# Patient Record
Sex: MALE | Race: WHITE | Employment: OTHER | URBAN - NONMETROPOLITAN AREA
[De-identification: names, ages, dates, MRNs, and addresses within clinical notes are randomized per-mention and may not be internally consistent; named-entity substitution may affect disease eponyms.]

---

## 2017-01-09 ENCOUNTER — OFFICE VISIT (OUTPATIENT)
Dept: FAMILY MEDICINE CLINIC | Age: 67
End: 2017-01-09

## 2017-01-09 VITALS
SYSTOLIC BLOOD PRESSURE: 136 MMHG | DIASTOLIC BLOOD PRESSURE: 88 MMHG | RESPIRATION RATE: 12 BRPM | BODY MASS INDEX: 32.41 KG/M2 | WEIGHT: 226.4 LBS | HEART RATE: 68 BPM | TEMPERATURE: 98.8 F | HEIGHT: 70 IN

## 2017-01-09 DIAGNOSIS — E78.00 PURE HYPERCHOLESTEROLEMIA: Chronic | ICD-10-CM

## 2017-01-09 DIAGNOSIS — I10 ESSENTIAL HYPERTENSION: Chronic | ICD-10-CM

## 2017-01-09 DIAGNOSIS — R73.03 PREDIABETES: Primary | Chronic | ICD-10-CM

## 2017-01-09 DIAGNOSIS — Z11.59 NEED FOR HEPATITIS C SCREENING TEST: ICD-10-CM

## 2017-01-09 PROCEDURE — 99214 OFFICE O/P EST MOD 30 MIN: CPT | Performed by: FAMILY MEDICINE

## 2017-01-10 ENCOUNTER — TELEPHONE (OUTPATIENT)
Dept: FAMILY MEDICINE CLINIC | Age: 67
End: 2017-01-10

## 2017-03-03 ENCOUNTER — TELEPHONE (OUTPATIENT)
Dept: FAMILY MEDICINE CLINIC | Age: 67
End: 2017-03-03

## 2017-04-03 ENCOUNTER — TELEPHONE (OUTPATIENT)
Dept: FAMILY MEDICINE CLINIC | Age: 67
End: 2017-04-03

## 2017-04-03 DIAGNOSIS — R73.9 HYPERGLYCEMIA: ICD-10-CM

## 2017-04-03 DIAGNOSIS — R73.03 PREDIABETES: Primary | Chronic | ICD-10-CM

## 2017-04-17 RX ORDER — AMITRIPTYLINE HYDROCHLORIDE 25 MG/1
25 TABLET, FILM COATED ORAL NIGHTLY
Qty: 30 TABLET | Refills: 11 | Status: SHIPPED | OUTPATIENT
Start: 2017-04-17 | End: 2018-04-30 | Stop reason: SDUPTHER

## 2017-04-17 RX ORDER — LISINOPRIL AND HYDROCHLOROTHIAZIDE 12.5; 1 MG/1; MG/1
1 TABLET ORAL DAILY
Qty: 90 TABLET | Refills: 3 | Status: SHIPPED | OUTPATIENT
Start: 2017-04-17 | End: 2018-05-15 | Stop reason: SDUPTHER

## 2017-05-08 ENCOUNTER — TELEPHONE (OUTPATIENT)
Dept: FAMILY MEDICINE CLINIC | Age: 67
End: 2017-05-08

## 2017-05-08 DIAGNOSIS — E78.00 PURE HYPERCHOLESTEROLEMIA: Primary | Chronic | ICD-10-CM

## 2017-05-09 RX ORDER — ATORVASTATIN CALCIUM 20 MG/1
20 TABLET, FILM COATED ORAL DAILY
Qty: 30 TABLET | Refills: 11 | Status: SHIPPED | OUTPATIENT
Start: 2017-05-09 | End: 2017-05-09

## 2017-05-09 RX ORDER — ROSUVASTATIN CALCIUM 10 MG/1
10 TABLET, COATED ORAL DAILY
Qty: 30 TABLET | Refills: 11 | Status: SHIPPED | OUTPATIENT
Start: 2017-05-09 | End: 2018-05-15

## 2017-06-28 ENCOUNTER — TELEPHONE (OUTPATIENT)
Dept: FAMILY MEDICINE CLINIC | Age: 67
End: 2017-06-28

## 2017-11-06 ENCOUNTER — TELEPHONE (OUTPATIENT)
Dept: FAMILY MEDICINE CLINIC | Age: 67
End: 2017-11-06

## 2018-04-30 RX ORDER — AMITRIPTYLINE HYDROCHLORIDE 25 MG/1
25 TABLET, FILM COATED ORAL NIGHTLY
Qty: 30 TABLET | Refills: 11 | Status: SHIPPED | OUTPATIENT
Start: 2018-04-30 | End: 2018-06-05 | Stop reason: SDUPTHER

## 2018-05-07 ENCOUNTER — TELEPHONE (OUTPATIENT)
Dept: FAMILY MEDICINE CLINIC | Age: 68
End: 2018-05-07

## 2018-05-15 ENCOUNTER — OFFICE VISIT (OUTPATIENT)
Dept: FAMILY MEDICINE CLINIC | Age: 68
End: 2018-05-15
Payer: MEDICARE

## 2018-05-15 VITALS
BODY MASS INDEX: 33.64 KG/M2 | HEIGHT: 70 IN | TEMPERATURE: 98.3 F | WEIGHT: 235 LBS | RESPIRATION RATE: 14 BRPM | HEART RATE: 92 BPM | DIASTOLIC BLOOD PRESSURE: 84 MMHG | SYSTOLIC BLOOD PRESSURE: 122 MMHG

## 2018-05-15 DIAGNOSIS — I10 ESSENTIAL HYPERTENSION: Chronic | ICD-10-CM

## 2018-05-15 DIAGNOSIS — F51.01 PRIMARY INSOMNIA: Chronic | ICD-10-CM

## 2018-05-15 DIAGNOSIS — R73.03 PREDIABETES: Primary | Chronic | ICD-10-CM

## 2018-05-15 DIAGNOSIS — E78.00 PURE HYPERCHOLESTEROLEMIA: Chronic | ICD-10-CM

## 2018-05-15 PROCEDURE — 1123F ACP DISCUSS/DSCN MKR DOCD: CPT | Performed by: FAMILY MEDICINE

## 2018-05-15 PROCEDURE — G8417 CALC BMI ABV UP PARAM F/U: HCPCS | Performed by: FAMILY MEDICINE

## 2018-05-15 PROCEDURE — 3017F COLORECTAL CA SCREEN DOC REV: CPT | Performed by: FAMILY MEDICINE

## 2018-05-15 PROCEDURE — 99214 OFFICE O/P EST MOD 30 MIN: CPT | Performed by: FAMILY MEDICINE

## 2018-05-15 PROCEDURE — 1036F TOBACCO NON-USER: CPT | Performed by: FAMILY MEDICINE

## 2018-05-15 PROCEDURE — 4040F PNEUMOC VAC/ADMIN/RCVD: CPT | Performed by: FAMILY MEDICINE

## 2018-05-15 PROCEDURE — G8427 DOCREV CUR MEDS BY ELIG CLIN: HCPCS | Performed by: FAMILY MEDICINE

## 2018-05-15 RX ORDER — LISINOPRIL AND HYDROCHLOROTHIAZIDE 12.5; 1 MG/1; MG/1
1 TABLET ORAL DAILY
Qty: 90 TABLET | Refills: 3 | Status: SHIPPED | OUTPATIENT
Start: 2018-05-15 | End: 2019-08-05 | Stop reason: SDUPTHER

## 2018-05-15 ASSESSMENT — PATIENT HEALTH QUESTIONNAIRE - PHQ9
SUM OF ALL RESPONSES TO PHQ QUESTIONS 1-9: 0
1. LITTLE INTEREST OR PLEASURE IN DOING THINGS: 0
2. FEELING DOWN, DEPRESSED OR HOPELESS: 0
SUM OF ALL RESPONSES TO PHQ9 QUESTIONS 1 & 2: 0

## 2018-06-05 ENCOUNTER — TELEPHONE (OUTPATIENT)
Dept: FAMILY MEDICINE CLINIC | Age: 68
End: 2018-06-05

## 2018-06-05 DIAGNOSIS — F51.01 PRIMARY INSOMNIA: Primary | Chronic | ICD-10-CM

## 2018-06-05 RX ORDER — AMITRIPTYLINE HYDROCHLORIDE 25 MG/1
25-50 TABLET, FILM COATED ORAL NIGHTLY
Qty: 60 TABLET | Refills: 11 | Status: SHIPPED | OUTPATIENT
Start: 2018-06-05 | End: 2018-07-18 | Stop reason: SDUPTHER

## 2018-07-18 DIAGNOSIS — F51.01 PRIMARY INSOMNIA: Chronic | ICD-10-CM

## 2018-07-18 RX ORDER — AMITRIPTYLINE HYDROCHLORIDE 25 MG/1
25-50 TABLET, FILM COATED ORAL NIGHTLY
Qty: 60 TABLET | Refills: 11 | Status: SHIPPED | OUTPATIENT
Start: 2018-07-18 | End: 2019-02-22 | Stop reason: CLARIF

## 2018-11-05 ENCOUNTER — TELEPHONE (OUTPATIENT)
Dept: FAMILY MEDICINE CLINIC | Age: 68
End: 2018-11-05

## 2018-11-05 DIAGNOSIS — E78.00 PURE HYPERCHOLESTEROLEMIA: Primary | Chronic | ICD-10-CM

## 2018-11-05 NOTE — TELEPHONE ENCOUNTER
Pt returned our call & states he really doesn't believe he's eating any different. Pt states \"in fact I think I've been eating a little bit better than I was. \"  Please advise.

## 2019-01-09 ENCOUNTER — OFFICE VISIT (OUTPATIENT)
Dept: FAMILY MEDICINE CLINIC | Age: 69
End: 2019-01-09
Payer: MEDICARE

## 2019-01-09 VITALS
RESPIRATION RATE: 16 BRPM | TEMPERATURE: 97.8 F | SYSTOLIC BLOOD PRESSURE: 126 MMHG | HEIGHT: 70 IN | BODY MASS INDEX: 33.58 KG/M2 | WEIGHT: 234.6 LBS | DIASTOLIC BLOOD PRESSURE: 82 MMHG | HEART RATE: 96 BPM

## 2019-01-09 DIAGNOSIS — F41.9 ANXIETY: ICD-10-CM

## 2019-01-09 DIAGNOSIS — H93.13 TINNITUS OF BOTH EARS: Primary | Chronic | ICD-10-CM

## 2019-01-09 PROCEDURE — G8427 DOCREV CUR MEDS BY ELIG CLIN: HCPCS | Performed by: FAMILY MEDICINE

## 2019-01-09 PROCEDURE — 1123F ACP DISCUSS/DSCN MKR DOCD: CPT | Performed by: FAMILY MEDICINE

## 2019-01-09 PROCEDURE — G8417 CALC BMI ABV UP PARAM F/U: HCPCS | Performed by: FAMILY MEDICINE

## 2019-01-09 PROCEDURE — 4040F PNEUMOC VAC/ADMIN/RCVD: CPT | Performed by: FAMILY MEDICINE

## 2019-01-09 PROCEDURE — 3017F COLORECTAL CA SCREEN DOC REV: CPT | Performed by: FAMILY MEDICINE

## 2019-01-09 PROCEDURE — 1101F PT FALLS ASSESS-DOCD LE1/YR: CPT | Performed by: FAMILY MEDICINE

## 2019-01-09 PROCEDURE — 99213 OFFICE O/P EST LOW 20 MIN: CPT | Performed by: FAMILY MEDICINE

## 2019-01-09 PROCEDURE — 1036F TOBACCO NON-USER: CPT | Performed by: FAMILY MEDICINE

## 2019-01-09 PROCEDURE — G8482 FLU IMMUNIZE ORDER/ADMIN: HCPCS | Performed by: FAMILY MEDICINE

## 2019-01-09 RX ORDER — CLONAZEPAM 0.5 MG/1
0.5 TABLET ORAL 2 TIMES DAILY PRN
Qty: 60 TABLET | Refills: 0 | Status: SHIPPED | OUTPATIENT
Start: 2019-01-09 | End: 2019-02-22 | Stop reason: SDUPTHER

## 2019-01-10 ENCOUNTER — TELEPHONE (OUTPATIENT)
Dept: FAMILY MEDICINE CLINIC | Age: 69
End: 2019-01-10

## 2019-02-22 ENCOUNTER — OFFICE VISIT (OUTPATIENT)
Dept: FAMILY MEDICINE CLINIC | Age: 69
End: 2019-02-22
Payer: MEDICARE

## 2019-02-22 VITALS
TEMPERATURE: 98 F | DIASTOLIC BLOOD PRESSURE: 78 MMHG | RESPIRATION RATE: 20 BRPM | WEIGHT: 239 LBS | BODY MASS INDEX: 34.22 KG/M2 | HEIGHT: 70 IN | SYSTOLIC BLOOD PRESSURE: 110 MMHG | HEART RATE: 86 BPM

## 2019-02-22 DIAGNOSIS — F41.9 ANXIETY: ICD-10-CM

## 2019-02-22 DIAGNOSIS — I10 ESSENTIAL HYPERTENSION: Chronic | ICD-10-CM

## 2019-02-22 DIAGNOSIS — H93.13 TINNITUS OF BOTH EARS: Primary | Chronic | ICD-10-CM

## 2019-02-22 DIAGNOSIS — E78.00 PURE HYPERCHOLESTEROLEMIA: Chronic | ICD-10-CM

## 2019-02-22 PROCEDURE — 3017F COLORECTAL CA SCREEN DOC REV: CPT | Performed by: FAMILY MEDICINE

## 2019-02-22 PROCEDURE — 1101F PT FALLS ASSESS-DOCD LE1/YR: CPT | Performed by: FAMILY MEDICINE

## 2019-02-22 PROCEDURE — G8482 FLU IMMUNIZE ORDER/ADMIN: HCPCS | Performed by: FAMILY MEDICINE

## 2019-02-22 PROCEDURE — 4040F PNEUMOC VAC/ADMIN/RCVD: CPT | Performed by: FAMILY MEDICINE

## 2019-02-22 PROCEDURE — G8417 CALC BMI ABV UP PARAM F/U: HCPCS | Performed by: FAMILY MEDICINE

## 2019-02-22 PROCEDURE — G8427 DOCREV CUR MEDS BY ELIG CLIN: HCPCS | Performed by: FAMILY MEDICINE

## 2019-02-22 PROCEDURE — 1123F ACP DISCUSS/DSCN MKR DOCD: CPT | Performed by: FAMILY MEDICINE

## 2019-02-22 PROCEDURE — 99214 OFFICE O/P EST MOD 30 MIN: CPT | Performed by: FAMILY MEDICINE

## 2019-02-22 PROCEDURE — 1036F TOBACCO NON-USER: CPT | Performed by: FAMILY MEDICINE

## 2019-02-22 RX ORDER — DULOXETIN HYDROCHLORIDE 30 MG/1
30 CAPSULE, DELAYED RELEASE ORAL DAILY
Qty: 30 CAPSULE | Refills: 3 | Status: SHIPPED | OUTPATIENT
Start: 2019-02-22 | End: 2020-10-27

## 2019-02-22 RX ORDER — AMITRIPTYLINE HYDROCHLORIDE 25 MG/1
25 TABLET, FILM COATED ORAL NIGHTLY
COMMUNITY
End: 2019-08-05 | Stop reason: SDUPTHER

## 2019-02-22 RX ORDER — CLONAZEPAM 0.5 MG/1
0.5 TABLET ORAL NIGHTLY PRN
Qty: 30 TABLET | Refills: 0 | Status: SHIPPED | OUTPATIENT
Start: 2019-02-22 | End: 2020-10-27

## 2019-02-22 ASSESSMENT — PATIENT HEALTH QUESTIONNAIRE - PHQ9
SUM OF ALL RESPONSES TO PHQ QUESTIONS 1-9: 0
1. LITTLE INTEREST OR PLEASURE IN DOING THINGS: 0
SUM OF ALL RESPONSES TO PHQ9 QUESTIONS 1 & 2: 0
SUM OF ALL RESPONSES TO PHQ QUESTIONS 1-9: 0
2. FEELING DOWN, DEPRESSED OR HOPELESS: 0

## 2019-02-24 ASSESSMENT — ENCOUNTER SYMPTOMS
RHINORRHEA: 0
SHORTNESS OF BREATH: 0
CONSTIPATION: 0
DIARRHEA: 0
NAUSEA: 0
BACK PAIN: 0
SORE THROAT: 0
WHEEZING: 0
COUGH: 0
ABDOMINAL PAIN: 0
VOMITING: 0

## 2019-03-05 ENCOUNTER — TELEPHONE (OUTPATIENT)
Dept: FAMILY MEDICINE CLINIC | Age: 69
End: 2019-03-05

## 2019-03-08 DIAGNOSIS — F41.9 ANXIETY: ICD-10-CM

## 2019-03-08 DIAGNOSIS — H93.13 TINNITUS OF BOTH EARS: Chronic | ICD-10-CM

## 2019-03-08 RX ORDER — CLONAZEPAM 0.5 MG/1
0.5 TABLET ORAL NIGHTLY PRN
Qty: 30 TABLET | Refills: 0 | OUTPATIENT
Start: 2019-03-08 | End: 2019-04-07

## 2019-03-11 ENCOUNTER — TELEPHONE (OUTPATIENT)
Dept: FAMILY MEDICINE CLINIC | Age: 69
End: 2019-03-11

## 2019-03-11 DIAGNOSIS — F41.9 ANXIETY: ICD-10-CM

## 2019-03-11 DIAGNOSIS — H93.13 TINNITUS OF BOTH EARS: Chronic | ICD-10-CM

## 2019-03-12 RX ORDER — CLONAZEPAM 0.5 MG/1
TABLET ORAL
Qty: 30 TABLET | Refills: 0 | OUTPATIENT
Start: 2019-03-12

## 2019-05-14 ENCOUNTER — PATIENT MESSAGE (OUTPATIENT)
Dept: FAMILY MEDICINE CLINIC | Age: 69
End: 2019-05-14

## 2019-05-21 NOTE — TELEPHONE ENCOUNTER
Pt returned call. He didn't get the labs done yet. [facility] [address]   Please fax the order

## 2019-06-28 ENCOUNTER — HOSPITAL ENCOUNTER (OUTPATIENT)
Age: 69
Discharge: HOME | End: 2019-06-28
Payer: MEDICARE

## 2019-06-28 DIAGNOSIS — E78.00 PURE HYPERCHOLESTEROLEMIA: Chronic | ICD-10-CM

## 2019-06-28 LAB
CHOLESTEROL, TOTAL: 189 MG/DL (ref 100–199)
HDLC SERPL-MCNC: 52 MG/DL
LDL CHOLESTEROL CALCULATED: 117 MG/DL
TRIGL SERPL-MCNC: 101 MG/DL (ref 0–199)

## 2019-06-28 PROCEDURE — 36415 COLL VENOUS BLD VENIPUNCTURE: CPT

## 2019-06-28 PROCEDURE — 80061 LIPID PANEL: CPT

## 2019-07-01 ENCOUNTER — TELEPHONE (OUTPATIENT)
Dept: FAMILY MEDICINE CLINIC | Age: 69
End: 2019-07-01

## 2019-08-03 DIAGNOSIS — F51.01 PRIMARY INSOMNIA: Chronic | ICD-10-CM

## 2019-08-05 RX ORDER — AMITRIPTYLINE HYDROCHLORIDE 25 MG/1
TABLET, FILM COATED ORAL
Qty: 60 TABLET | Refills: 11 | Status: SHIPPED | OUTPATIENT
Start: 2019-08-05 | End: 2020-10-12

## 2019-08-05 RX ORDER — LISINOPRIL AND HYDROCHLOROTHIAZIDE 12.5; 1 MG/1; MG/1
1 TABLET ORAL DAILY
Qty: 90 TABLET | Refills: 3 | Status: SHIPPED | OUTPATIENT
Start: 2019-08-05 | End: 2021-02-24 | Stop reason: SDUPTHER

## 2019-10-08 ENCOUNTER — HOSPITAL ENCOUNTER (EMERGENCY)
Age: 69
Discharge: HOME | End: 2019-10-09
Attending: EMERGENCY MEDICINE
Payer: MEDICARE

## 2019-10-08 VITALS
OXYGEN SATURATION: 97 % | RESPIRATION RATE: 17 BRPM | BODY MASS INDEX: 31.15 KG/M2 | HEIGHT: 72 IN | DIASTOLIC BLOOD PRESSURE: 102 MMHG | TEMPERATURE: 98.2 F | HEART RATE: 108 BPM | SYSTOLIC BLOOD PRESSURE: 146 MMHG | WEIGHT: 230 LBS

## 2019-10-08 DIAGNOSIS — T16.1XXA FOREIGN BODY OF RIGHT EAR, INITIAL ENCOUNTER: Primary | ICD-10-CM

## 2019-10-08 PROCEDURE — 69200 CLEAR OUTER EAR CANAL: CPT

## 2019-10-08 PROCEDURE — 99282 EMERGENCY DEPT VISIT SF MDM: CPT

## 2019-10-08 ASSESSMENT — ENCOUNTER SYMPTOMS
EYE DISCHARGE: 0
VOICE CHANGE: 0
SORE THROAT: 0
SHORTNESS OF BREATH: 0
BLOOD IN STOOL: 0
ABDOMINAL DISTENTION: 0
CHEST TIGHTNESS: 0
PHOTOPHOBIA: 0
NAUSEA: 0
DIARRHEA: 0
RHINORRHEA: 0
SINUS PRESSURE: 0
COUGH: 0
EYE REDNESS: 0
WHEEZING: 0
CHOKING: 0
ABDOMINAL PAIN: 0
TROUBLE SWALLOWING: 0
EYE ITCHING: 0
CONSTIPATION: 0
VOMITING: 0
BACK PAIN: 0
EYE PAIN: 0

## 2019-10-10 ASSESSMENT — ENCOUNTER SYMPTOMS
BACK PAIN: 0
TROUBLE SWALLOWING: 0
EYE REDNESS: 0
WHEEZING: 0
CONSTIPATION: 0
RHINORRHEA: 0
EYE ITCHING: 0
SHORTNESS OF BREATH: 0
PHOTOPHOBIA: 0
SORE THROAT: 0
CHOKING: 0
EYE DISCHARGE: 0
NAUSEA: 0
CHEST TIGHTNESS: 0
DIARRHEA: 0
EYE PAIN: 0
BLOOD IN STOOL: 0
ABDOMINAL PAIN: 0
COUGH: 0
VOICE CHANGE: 0
ABDOMINAL DISTENTION: 0
SINUS PRESSURE: 0
VOMITING: 0

## 2019-11-04 ENCOUNTER — TELEPHONE (OUTPATIENT)
Dept: FAMILY MEDICINE CLINIC | Age: 69
End: 2019-11-04

## 2020-09-26 ENCOUNTER — HOSPITAL ENCOUNTER (EMERGENCY)
Age: 70
Discharge: HOME | End: 2020-09-26
Attending: EMERGENCY MEDICINE
Payer: MEDICARE

## 2020-09-26 VITALS
RESPIRATION RATE: 16 BRPM | SYSTOLIC BLOOD PRESSURE: 134 MMHG | OXYGEN SATURATION: 97 % | HEART RATE: 68 BPM | DIASTOLIC BLOOD PRESSURE: 100 MMHG | TEMPERATURE: 99.3 F

## 2020-09-26 PROCEDURE — 99283 EMERGENCY DEPT VISIT LOW MDM: CPT

## 2020-09-26 RX ORDER — FLUTICASONE PROPIONATE 50 MCG
1 SPRAY, SUSPENSION (ML) NASAL DAILY
Qty: 1 BOTTLE | Refills: 0 | Status: SHIPPED | OUTPATIENT
Start: 2020-09-26 | End: 2020-10-04

## 2020-09-26 RX ORDER — LORATADINE 10 MG/1
10 TABLET ORAL DAILY
Qty: 5 TABLET | Refills: 0 | Status: SHIPPED | OUTPATIENT
Start: 2020-09-26 | End: 2020-10-01

## 2020-09-26 ASSESSMENT — ENCOUNTER SYMPTOMS
SORE THROAT: 0
DIARRHEA: 0
RHINORRHEA: 1
ABDOMINAL PAIN: 0
SHORTNESS OF BREATH: 0
EYE DISCHARGE: 0
COUGH: 1
BLOOD IN STOOL: 0
WHEEZING: 0
EYE PAIN: 0

## 2020-09-27 NOTE — ED TRIAGE NOTES
Pt presents with complaints of his sinuses draining since this morning. Pt states the drainage has caused a cough. Pt denies fever denies sore throat. Pt has seasonal allergies. Pt alert and oriented. Skin pink warm and dry.

## 2020-09-27 NOTE — ED PROVIDER NOTES
[address]  [address] [place] [address] [address]  Phone: [phone]    eMERGENCY dEPARTMENT eNCOUnter           [address] [facility]       Chief Complaint   Patient presents with    Sinusitis       Nurses Notes reviewed and I agree except as noted in the HPI. HISTORY OF PRESENT ILLNESS    Дмитрий Serrano is a 65 y.o. male who presented via private vehicle. Complaint: Nasal drainage. Symptoms started 2 days ago. he is complaining of mild clear nasal drainage and nonproductive cough. He denies fever or chills. He denies shortness of breath. He denies chronic lung disease. He stated that he has not been feeling ill. REVIEW OF SYSTEMS     Review of Systems   Constitutional: Negative for chills and fever. HENT: Positive for congestion and rhinorrhea. Negative for sore throat. Eyes: Negative for pain and discharge. Respiratory: Positive for cough. Negative for shortness of breath and wheezing. Cardiovascular: Negative for chest pain and palpitations. Gastrointestinal: Negative for abdominal pain, blood in stool and diarrhea. Genitourinary: Negative for dysuria and hematuria. Musculoskeletal: Negative for neck pain and neck stiffness. Neurological: Negative for seizures, syncope and headaches. Psychiatric/Behavioral: Negative for confusion. PAST MEDICAL HISTORY    has a past medical history of Benign prostatic hyperplasia, Degenerative arthritis of lumbar spine, Diverticulosis, Hyperlipidemia, Hypertension, Insomnia, Obesity, Prediabetes, and Tinnitus. SURGICAL HISTORY      has a past surgical history that includes Cholecystectomy, laparoscopic (2005). CURRENT MEDICATIONS       Previous Medications    AMITRIPTYLINE (ELAVIL) 25 MG TABLET    TAKE 1 TO 2 TABLETS BY MOUTH NIGHTLY    CLONAZEPAM (KLONOPIN) 0.5 MG TABLET    Take 1 tablet by mouth nightly as needed for Anxiety (or for sleep) for up to 30 days. .    DULOXETINE (CYMBALTA) 30 MG EXTENDED RELEASE CAPSULE    Take 1 capsule by mouth daily    LISINOPRIL-HYDROCHLOROTHIAZIDE (ZESTORETIC) 10-12.5 MG PER TABLET    Take 1 tablet by mouth daily    VITAMIN D (CHOLECALCIFEROL) 1000 UNIT TABS TABLET    Take 1,000 Units by mouth daily. ALLERGIES     is allergic to penicillins. FAMILY HISTORY     He indicated that his mother is . family history includes Diabetes in his mother; Heart Attack in his mother. SOCIAL HISTORY      reports that he has never smoked. He has never used smokeless tobacco. He reports that he does not drink alcohol or use drugs. PHYSICAL EXAM     INITIAL VITALS:  temporal temperature is 99.3 °F (37.4 °C). His blood pressure is 134/100 (abnormal) and his pulse is 68. His respiration is 16 and oxygen saturation is 97%. Physical Exam   Constitutional: He appears well-developed and well-nourished. No distress. HENT:   Nose: Mucosal edema and rhinorrhea present. Mouth/Throat: Oropharynx is clear and moist.   Neck: No JVD present. No thyromegaly present. Cardiovascular: Normal rate and regular rhythm. Exam reveals no gallop and no friction rub. No murmur heard. Pulmonary/Chest: Effort normal and breath sounds normal.   Abdominal: Soft. Bowel sounds are normal. There is no abdominal tenderness. Musculoskeletal:         General: No tenderness or edema. Neurological: He is alert. Nursing note and vitals reviewed. DIFFERENTIAL DIAGNOSIS:       DIAGNOSTIC RESULTS         LABS:   Labs Reviewed - No data to display    EMERGENCY DEPARTMENT COURSE:   Vitals:    Vitals:    20   BP: (!) 134/100   Pulse: 68   Resp: 16   Temp: 99.3 °F (37.4 °C)   TempSrc: Temporal   SpO2: 97%         FINAL IMPRESSION      1. Viral URI with cough          DISPOSITION/PLAN   He was discharged home in good condition, I discussed with him discharge instructions.     PATIENT REFERRED TO:  [address]ine [address]  [address] [facility]  [phone]    In 2 days        DISCHARGE MEDICATIONS:  New Prescriptions    FLUTICASONE (FLONASE) 50 MCG/ACT NASAL SPRAY    1 spray by Each Nostril route daily    LORATADINE (CLARITIN) 10 MG TABLET    Take 1 tablet by mouth daily for 5 days       (Please note that portions of this note were completed with a voice recognition program.  Efforts were made to edit the dictations but occasionally words are mis-transcribed.)    MD Vamsi Escobar MD  09/26/20 2025

## 2020-10-06 ENCOUNTER — TELEPHONE (OUTPATIENT)
Dept: FAMILY MEDICINE CLINIC | Age: 70
End: 2020-10-06

## 2020-10-06 ENCOUNTER — HOSPITAL ENCOUNTER (OUTPATIENT)
Age: 70
Setting detail: SPECIMEN
Discharge: HOME | End: 2020-10-06
Payer: MEDICARE

## 2020-10-06 PROCEDURE — U0003 INFECTIOUS AGENT DETECTION BY NUCLEIC ACID (DNA OR RNA); SEVERE ACUTE RESPIRATORY SYNDROME CORONAVIRUS 2 (SARS-COV-2) (CORONAVIRUS DISEASE [COVID-19]), AMPLIFIED PROBE TECHNIQUE, MAKING USE OF HIGH THROUGHPUT TECHNOLOGIES AS DESCRIBED BY CMS-2020-01-R: HCPCS

## 2020-10-06 NOTE — TELEPHONE ENCOUNTER
pts wife had tele visit for respiratory sxs    She notes  with similar sxs    Fever, cough, malaise, loss of taste and smell    Pt refused evaluation. She is asking to at least have him tested    Will order testing    Counseled wife on ER precautions, self isolation. If sxs worsen, pt to call    Reviewed ER precautions, pt wife understands. Diagnosis Orders   1.  Suspected COVID-19 virus infection  COVID-19 Ambulatory

## 2020-10-08 LAB — SARS-COV-2: DETECTED

## 2020-10-09 ENCOUNTER — TELEPHONE (OUTPATIENT)
Dept: FAMILY MEDICINE CLINIC | Age: 70
End: 2020-10-09

## 2020-10-09 NOTE — TELEPHONE ENCOUNTER
Pt's wife informed. She states pt has his taste back a little bit,  Mouth is dry, has cough with sputum, but feels fine other than that.

## 2020-10-09 NOTE — TELEPHONE ENCOUNTER
Ok, good    Monitor his symptoms    If symptoms were to worse, go to ER. He will need to self isolate for at least 10 days. He may come out of isolation once he is 10 days from symptom onset and he must also be symptom and fever free for 24 hours. Let me know if questions, thanks!

## 2020-10-12 ENCOUNTER — TELEPHONE (OUTPATIENT)
Dept: FAMILY MEDICINE CLINIC | Age: 70
End: 2020-10-12

## 2020-10-12 RX ORDER — AMITRIPTYLINE HYDROCHLORIDE 25 MG/1
TABLET, FILM COATED ORAL
Qty: 60 TABLET | Refills: 3 | Status: SHIPPED | OUTPATIENT
Start: 2020-10-12 | End: 2021-04-09

## 2020-10-12 RX ORDER — AMITRIPTYLINE HYDROCHLORIDE 25 MG/1
TABLET, FILM COATED ORAL
Qty: 30 TABLET | Refills: 0 | Status: CANCELLED | OUTPATIENT
Start: 2020-10-12

## 2020-10-12 NOTE — TELEPHONE ENCOUNTER
I just sent a RF for that earlier today, actually    Do keep f/u apt as scheduled for 10/27      Thanks!     Future Appointments   Date Time Provider Shanell Juarez   10/27/2020  4:20 PM Filiberto Vargas, [address] [place] [address]

## 2020-10-12 NOTE — TELEPHONE ENCOUNTER
Recent Visits  No visits were found meeting these conditions. Showing recent visits within past 540 days with a meds authorizing provider and meeting all other requirements     Future Appointments  No visits were found meeting these conditions. Showing future appointments within next 150 days with a meds authorizing provider and meeting all other requirements     No future appointments.

## 2020-10-12 NOTE — TELEPHONE ENCOUNTER
Pt  Has  An appt today . Dr. Franklin Monday needs to know when pt's  Symptoms for  covid first started . ( date  ) . Is  The patient  Having any continuing covid  Sx ? Spoke to wife :  09/29/20 started . Guido Confer No fever , pt does have cough still . Starting to regain sense of taste and smell. Pt does not need seen for any acute problems. Pt just needs a  Short  Supply of  amitriptyline 25 mg  Until he is well enough to get into office .       Short rx pended     appt  Rescheduled     Future Appointments   Date Time Provider Shanell Juarez   10/27/2020  4:20 PM William Buck, [address] [facility]

## 2020-10-26 NOTE — PROGRESS NOTES
Chief Complaint   Patient presents with    Medicare AWV    Follow-up     chronic conditions       History obtained from the patient. SUBJECTIVE:  Manju Snell is a 65 y.o. male that presents today for       -Tinnitus LAST VISIT: chronic issue, bilateral. Had for 20 years. No cause. No cure. Used to be pretty bad, but had been ok until 4 months ago. Started to become severe again. Hard to deal with. Denies SI/HI. Has some hearing loss, not new. Has hearing aides, don't help with tinnitus. Used to be on xanax for this, off 10 years, asking to resume to help deal with tinnitus. No vertigo. No headaches. No head trauma. No falls. No changes that he can think of. Hard to sleep and fxn. Last saw spec at [facility] and [facility] 10-20 years ago. Dr. Rian Haro had on xanax as above. No sI/HI. Mild anxiety. UPDATE TODAY:   Lost to f/u  Doing better  Coping well  Not needed BZD any more  No dizziness  No HA's      -HTN:    HPI:    Taking meds as prescribed ?: yes  Tolerating well ?: yes  Side Effects ?: denies  BP at home ?: <140/90  Working on TLCS ?: yes  Chest Pain/SOB/Palpitations? denies      -PreDM: noted on labs. Working on diet, wt loss and exercise. Due for labs. -HLD:     HPI: due for labs, labs had improved with diet and exercise. High cohort score yet. Was trialled on crestor previously. However, no longer taking.      Working on TLCS ?: yes      -insomina: on elavil nightly. Working well. Sleep well with med w/o side effects.        Age/Gender Health Maintenance    Lipid - ; ; HDL 53;  (MAY 2014)    Lab Results   Component Value Date    CHOL 186 11/02/2019    CHOL 189 06/28/2019    CHOL 207 (H) 11/03/2018     Lab Results   Component Value Date    TRIG 97 11/02/2019    TRIG 101 06/28/2019    TRIG 93 11/03/2018     Lab Results   Component Value Date    HDL 52 11/02/2019    HDL 52 06/28/2019    HDL 47 11/03/2018     Lab Results   Component Value Date    LDLCALC 115 (H) 11/02/2019 LDLCALC 117 06/28/2019    LDLCALC 141 (H) 11/03/2018       DM Screen - 79 MAY 2014    Lab Results   Component Value Date    GLUCOSE 97 11/02/2019       Lab Results   Component Value Date    LABA1C 5.9 11/03/2018     Lab Results   Component Value Date     11/03/2018         Colon Cancer Screening - + multiple polyps SEPT 2018, repeat 5 years  Lung Cancer Screening (Age 54 to [de-identified] with 30 pack year hx, current smoker or quit within past 15 years) - never smoker    Tetanus - to get at pharmacy per medicare rules  Influenza Vaccine - UTD OCT 2020  Pneumonia Vaccine - UTD X2 both PPV 23 and PCV 13  Zostavax - UTD 2014  Shingrix - to get at pharmacy per medicare rules    PSA testing discussion - 1.15 MAY 2014  Lab Results   Component Value Date    PSA 1.32 11/02/2019    PSA 1.32 11/03/2018    PSA 1.19 05/05/2018       AAA Screening - never smoker. Current Outpatient Medications   Medication Sig Dispense Refill    Ascorbic Acid (VITAMIN C) 250 MG tablet Take 250 mg by mouth daily      amitriptyline (ELAVIL) 25 MG tablet TAKE 1 TO 2 TABLETS BY MOUTH NIGHTLY 60 tablet 3    lisinopril-hydrochlorothiazide (ZESTORETIC) 10-12.5 MG per tablet Take 1 tablet by mouth daily 90 tablet 3    vitamin D (CHOLECALCIFEROL) 1000 UNIT TABS tablet Take 1,000 Units by mouth daily. No current facility-administered medications for this visit. No orders of the defined types were placed in this encounter. All medications reviewed and reconciled, including OTC and herbal medications. Updated list given to patient.        Patient Active Problem List    Diagnosis Date Noted    Hypertension     Tinnitus      Of unknown cause      Obesity     Benign prostatic hyperplasia     Prediabetes     Degenerative arthritis of lumbar spine     Hyperlipidemia     Diverticulosis     Insomnia          Past Medical History:   Diagnosis Date    Benign prostatic hyperplasia     Degenerative arthritis of lumbar spine     Diverticulosis     Hyperlipidemia     Hypertension     Insomnia     Obesity     Prediabetes     Tinnitus     Of unknown cause         Past Surgical History:   Procedure Laterality Date    CHOLECYSTECTOMY, LAPAROSCOPIC  2005         Allergies   Allergen Reactions    Penicillins Rash       Social History     Tobacco Use    Smoking status: Never Smoker    Smokeless tobacco: Never Used   Substance Use Topics    Alcohol use: No       Family History   Problem Relation Age of Onset    Heart Attack Mother     Diabetes Mother          I have reviewed the patient's past medical history, past surgical history, allergies, medications, social and family history and I have made updates where appropriate. Review of Systems  Positive responses are highlighted in bold    Constitutional:  Fever, Chills, Night Sweats, Fatigue, Unexpected changes in weight  HENT:  Ear pain, Tinnitus, Nosebleeds, Trouble swallowing, Hearing loss, Sore throat  Cardiovascular:  Chest Pain, Palpitations, Orthopnea, Paroxysmal Nocturnal Dyspnea  Respiratory:  Cough, Wheezing, Shortness of breath, Chest tightness, Apnea  Gastrointestinal:  Nausea, Vomiting, Diarrhea, Constipation, Heartburn, Blood in stool  Genitourinary:  Difficulty or painful urination, Flank pain, Change in frequency, Urgency  Skin:  Color change, Rash, Itching, Wound  Musculoskeletal:  Joint pain, Back pain, Gait problems, Joint swelling, Myalgias  Neurological:  Dizziness, Headaches, Presyncope, Numbness, Seizures, Tremors  Endocrine:  Heat Intolerance, Cold Intolerance, Polydipsia, Polyphagia, Polyuria      PHYSICAL EXAM:  Vitals:    10/27/20 1627   BP: 126/84   Pulse: 81   Resp: 10   Temp: 98.7 °F (37.1 °C)   TempSrc: Oral   SpO2: 98%   Weight: 236 lb 12.8 oz (107.4 kg)   Height: 5' 9.88\" (1.775 m)     Body mass index is 34.09 kg/m².   Pain Score:   0 - No pain    VS Reviewed  General Appearance: A&O x 3, No acute distress,well developed and well- nourished  Eyes: pupils equal, round, and reactive to light, extraocular eye movements intact, conjunctivae and eye lids without erythema  ENT: external ear and ear canal clear bilaterally, TMs intact and regular, nose without deformity, nasal mucosa and turbinates normal without polyps, oropharynx normal, dentition is normal for age  Neck: supple and non-tender without mass, no thyromegaly or thyroid nodules, no cervical lymphadenopathy  Pulmonary/Chest: clear to auscultation bilaterally- no wheezes, rales or rhonchi, normal air movement, no respiratory distress or retractions  Cardiovascular: S1 and S2 auscultated w/ RRR. No murmurs, rubs, clicks, or gallops, distal pulses intact. Abdomen: soft, non-tender, non-distended, bowl sounds physiologic,  no rebound or guarding, no masses or hernias noted. Liver and spleen without enlargement. Extremities: no cyanosis, clubbing or edema of the lower extremities. Skin: warm and dry, no rash or erythema      ASSESSMENT & PLAN  1. Tinnitus of both ears    Stable  Monitor  If worsens, consider f/u [facility]    2. Essential hypertension    At goal  con't meds  Labs ordered    - CBC Auto Differential; Future  - Comprehensive Metabolic Panel; Future  - Lipid Panel; Future  - TSH with Reflex; Future  - Microalbumin / Creatinine Urine Ratio; Future    3. Prediabetes    con't lifestyle changes  Labs ordered    - Hemoglobin A1C; Future    4. Hyperglycemia    - Hemoglobin A1C; Future    5. Pure hypercholesterolemia    Cont life style changes  Check labs    - Comprehensive Metabolic Panel; Future  - Lipid Panel; Future  - TSH with Reflex; Future    6. Primary insomnia    Stable  con't elavil    7. Needs flu shot    - INFLUENZA, QUADV, ADJUVANTED, 65 YRS =, IM, PF, PREFILL SYR, 0.5ML (FLUAD)      DISPOSITION    Return in about 1 year (around 10/27/2021) for AWV, follow-up on chronic medical conditions, sooner as needed. Fabiano Vasquez released without restrictions. No future appointments.   PATIENT COUNSELING    Cezar received counseling on the following healthy behaviors: nutrition, exercise and medication adherence    Patient given educational materials on: See Attached    I have instructed Terri Saleem to complete a self tracking handout on Blood Pressures  and instructed them to bring it with them to his next appointment. Barriers to learning and self management: none    Discussed use, benefit, and side effects of prescribed medications. Barriers to medication compliance addressed. All patient questions answered. Pt voiced understanding. Electronically signed by Santiago Eng DO on 10/27/2020 at 4:49 PM        Medicare Annual Wellness Visit  Name: Cristobal Morse Date: 10/27/2020   MRN: [id number] Sex: Male   Age: 65 y.o. Ethnicity: Non-/Non    : 1950 Race: Caroline Matos is here for Medicare AWV and Follow-up (chronic conditions)    Screenings for behavioral, psychosocial and functional/safety risks, and cognitive dysfunction are all negative except as indicated below. These results, as well as other patient data from the [address] form, are documented in Flowsheets linked to this Encounter. Allergies   Allergen Reactions    Penicillins Rash         Prior to Visit Medications    Medication Sig Taking? Authorizing Provider   Ascorbic Acid (VITAMIN C) 250 MG tablet Take 250 mg by mouth daily Yes Historical Provider, MD   amitriptyline (ELAVIL) 25 MG tablet TAKE 1 TO 2 TABLETS BY MOUTH NIGHTLY Yes Chaparro Jhaveri DO   lisinopril-hydrochlorothiazide (ZESTORETIC) 10-12.5 MG per tablet Take 1 tablet by mouth daily Yes Santiago Eng DO   vitamin D (CHOLECALCIFEROL) 1000 UNIT TABS tablet Take 1,000 Units by mouth daily.  Yes Historical Provider, MD         Past Medical History:   Diagnosis Date    Benign prostatic hyperplasia     Degenerative arthritis of lumbar spine     Diverticulosis     Hyperlipidemia     Hypertension  Insomnia     Obesity     Prediabetes     Tinnitus     Of unknown cause       Past Surgical History:   Procedure Laterality Date    CHOLECYSTECTOMY, LAPAROSCOPIC  2005         Family History   Problem Relation Age of Onset    Heart Attack Mother     Diabetes Mother        CareTeam (Including outside providers/suppliers regularly involved in providing care):   Patient Care Team:  Abdirashid Fleming DO as PCP - General (Family Medicine)  Abdirashid Fleming DO as PCP - [facility] Empaneled Provider    Wt Readings from Last 3 Encounters:   10/27/20 236 lb 12.8 oz (107.4 kg)   10/08/19 230 lb (104.3 kg)   02/22/19 239 lb (108.4 kg)     Vitals:    10/27/20 1627   BP: 126/84   Pulse: 81   Resp: 10   Temp: 98.7 °F (37.1 °C)   TempSrc: Oral   SpO2: 98%   Weight: 236 lb 12.8 oz (107.4 kg)   Height: 5' 9.88\" (1.775 m)     Body mass index is 34.09 kg/m². Based upon direct observation of the patient, evaluation of cognition reveals recent and remote memory intact. Patient's complete Health Risk Assessment and screening values have been reviewed and are found in Flowsheets. The following problems were reviewed today and where indicated follow up appointments were made and/or referrals ordered. Positive Risk Factor Screenings with Interventions:       General Health and ACP:  General  In general, how would you say your health is?: Very Good  In the past 7 days, have you experienced any of the following?  New or Increased Pain, New or Increased Fatigue, Loneliness, Social Isolation, Stress or Anger?: None of These  Do you get the social and emotional support that you need?: Yes  Do you have a Living Will?: Yes  Advance Directives     Power of  Living Will ACP-Advance Directive ACP-Power of     Not on File Not on File Filed [address] [facility] [place] Risk Interventions:  · No Living Will: Patient declines ACP discussion/assistance    Health Habits/Nutrition:  Health Habits/Nutrition  Do you exercise for at least 20 minutes 2-3 times per week?: Yes  Have you lost any weight without trying in the past 3 months?: No  Do you eat fewer than 2 meals per day?: No  Have you seen a dentist within the past year?: (!) No  Body mass index: (!) 34.09  Health Habits/Nutrition Interventions:  · Nutritional issues:  educational materials for healthy, well-balanced diet provided  · Dental exam overdue:  patient encouraged to make appointment with his/her dentist    Hearing/Vision:  No exam data present  Hearing/Vision  Do you or your family notice any trouble with your hearing?: (!) Yes  Do you have difficulty driving, watching TV, or doing any of your daily activities because of your eyesight?: No  Have you had an eye exam within the past year?: Yes  Hearing/Vision Interventions:  · Hearing concerns:  has hearing aides    Personalized Preventive Plan   Current Health Maintenance Status  Immunization History   Administered Date(s) Administered    Influenza Vaccine, unspecified formulation 11/07/2016    Influenza Virus Vaccine 11/07/2015, 11/07/2016, 10/10/2019    Influenza, Quadv, adjuvanted, 65 yrs +, IM, PF (Fluad) 10/27/2020    Influenza, Triv, inactivated, subunit, adjuvanted, IM (Fluad 65 yrs and older) 10/02/2018    Pneumococcal Conjugate 13-valent ([id number]) 11/11/2015    Pneumococcal Polysaccharide ([id number]) 10/02/2014    Zoster Live (Zostavax) 02/26/2014        Health Maintenance   Topic Date Due    DTaP/Tdap/Td vaccine (1 - Tdap) 06[birth year]06/1967    Shingles Vaccine (2 of 3) 04/23/2014    Annual Wellness Visit (AWV)  05/29/2019    A1C test (Diabetic or Prediabetic)  11/03/2019    Lipid screen  11/02/2020    Potassium monitoring  11/02/2020    Creatinine monitoring  11/02/2020    Colon cancer screen colonoscopy  09/27/2023    Flu vaccine  Completed    Pneumococcal 65+ years Vaccine  Completed    Hepatitis C screen  Completed    Hepatitis A vaccine  Aged Out    Hepatitis B vaccine  Aged Out    Hib vaccine  Aged Out    Meningococcal (ACWY) vaccine  Aged Out     Recommendations for [employer] Due: see orders and patient instructions/AVS.  . Recommended screening schedule for the next 5-10 years is provided to the patient in written form: see Patient Instructions/AVS.    Gilmer Stallings was seen today for medicare awv and follow-up. Diagnoses and all orders for this visit:      Routine general medical examination at a health care facility      Care plan reviewed with and given to patient.        Electronically signed by Ashley Smallwood DO on 10/27/2020 at 4:49 PM

## 2020-10-27 ENCOUNTER — NURSE ONLY (OUTPATIENT)
Dept: LAB | Age: 70
End: 2020-10-27

## 2020-10-27 ENCOUNTER — OFFICE VISIT (OUTPATIENT)
Dept: FAMILY MEDICINE CLINIC | Age: 70
End: 2020-10-27
Payer: MEDICARE

## 2020-10-27 VITALS
SYSTOLIC BLOOD PRESSURE: 126 MMHG | HEIGHT: 70 IN | RESPIRATION RATE: 10 BRPM | BODY MASS INDEX: 33.9 KG/M2 | DIASTOLIC BLOOD PRESSURE: 84 MMHG | WEIGHT: 236.8 LBS | OXYGEN SATURATION: 98 % | TEMPERATURE: 98.7 F | HEART RATE: 81 BPM

## 2020-10-27 LAB
ALBUMIN SERPL-MCNC: 4 G/DL (ref 3.5–5.1)
ALP BLD-CCNC: 88 U/L (ref 38–126)
ALT SERPL-CCNC: 13 U/L (ref 11–66)
ANION GAP SERPL CALCULATED.3IONS-SCNC: 13 MEQ/L (ref 8–16)
AST SERPL-CCNC: 16 U/L (ref 5–40)
AVERAGE GLUCOSE: 129 MG/DL (ref 70–126)
BASOPHILS # BLD: 1.1 %
BASOPHILS ABSOLUTE: 0.1 THOU/MM3 (ref 0–0.1)
BILIRUB SERPL-MCNC: 1 MG/DL (ref 0.3–1.2)
BUN BLDV-MCNC: 10 MG/DL (ref 7–22)
CALCIUM SERPL-MCNC: 9.3 MG/DL (ref 8.5–10.5)
CHLORIDE BLD-SCNC: 99 MEQ/L (ref 98–111)
CHOLESTEROL, TOTAL: 205 MG/DL (ref 100–199)
CO2: 26 MEQ/L (ref 23–33)
CREAT SERPL-MCNC: 0.8 MG/DL (ref 0.4–1.2)
CREATININE, URINE: 30.4 MG/DL
EOSINOPHIL # BLD: 2.1 %
EOSINOPHILS ABSOLUTE: 0.1 THOU/MM3 (ref 0–0.4)
ERYTHROCYTE [DISTWIDTH] IN BLOOD BY AUTOMATED COUNT: 13.8 % (ref 11.5–14.5)
ERYTHROCYTE [DISTWIDTH] IN BLOOD BY AUTOMATED COUNT: 47.5 FL (ref 35–45)
GFR SERPL CREATININE-BSD FRML MDRD: > 90 ML/MIN/1.73M2
GLUCOSE BLD-MCNC: 93 MG/DL (ref 70–108)
HBA1C MFR BLD: 6.3 % (ref 4.4–6.4)
HCT VFR BLD CALC: 44.1 % (ref 42–52)
HDLC SERPL-MCNC: 52 MG/DL
HEMOGLOBIN: 14.2 GM/DL (ref 14–18)
IMMATURE GRANS (ABS): 0.03 THOU/MM3 (ref 0–0.07)
IMMATURE GRANULOCYTES: 0.4 %
LDL CHOLESTEROL CALCULATED: 136 MG/DL
LYMPHOCYTES # BLD: 36.8 %
LYMPHOCYTES ABSOLUTE: 2.6 THOU/MM3 (ref 1–4.8)
MCH RBC QN AUTO: 30.4 PG (ref 26–33)
MCHC RBC AUTO-ENTMCNC: 32.2 GM/DL (ref 32.2–35.5)
MCV RBC AUTO: 94.4 FL (ref 80–94)
MICROALBUMIN UR-MCNC: < 1.2 MG/DL
MICROALBUMIN/CREAT UR-RTO: 39 MG/G (ref 0–30)
MONOCYTES # BLD: 11 %
MONOCYTES ABSOLUTE: 0.8 THOU/MM3 (ref 0.4–1.3)
NUCLEATED RED BLOOD CELLS: 0 /100 WBC
PLATELET # BLD: 203 THOU/MM3 (ref 130–400)
PMV BLD AUTO: 11.5 FL (ref 9.4–12.4)
POTASSIUM SERPL-SCNC: 3.2 MEQ/L (ref 3.5–5.2)
RBC # BLD: 4.67 MILL/MM3 (ref 4.7–6.1)
SEG NEUTROPHILS: 48.6 %
SEGMENTED NEUTROPHILS ABSOLUTE COUNT: 3.5 THOU/MM3 (ref 1.8–7.7)
SODIUM BLD-SCNC: 138 MEQ/L (ref 135–145)
TOTAL PROTEIN: 8.2 G/DL (ref 6.1–8)
TRIGL SERPL-MCNC: 87 MG/DL (ref 0–199)
TSH SERPL DL<=0.05 MIU/L-ACNC: 0.72 UIU/ML (ref 0.4–4.2)
WBC # BLD: 7.1 THOU/MM3 (ref 4.8–10.8)

## 2020-10-27 PROCEDURE — G0008 ADMIN INFLUENZA VIRUS VAC: HCPCS | Performed by: FAMILY MEDICINE

## 2020-10-27 PROCEDURE — 90694 VACC AIIV4 NO PRSRV 0.5ML IM: CPT | Performed by: FAMILY MEDICINE

## 2020-10-27 PROCEDURE — G0438 PPPS, INITIAL VISIT: HCPCS | Performed by: FAMILY MEDICINE

## 2020-10-27 RX ORDER — MULTIVIT WITH MINERALS/LUTEIN
250 TABLET ORAL DAILY
COMMUNITY

## 2020-10-27 ASSESSMENT — PATIENT HEALTH QUESTIONNAIRE - PHQ9
SUM OF ALL RESPONSES TO PHQ QUESTIONS 1-9: 0
SUM OF ALL RESPONSES TO PHQ9 QUESTIONS 1 & 2: 0
SUM OF ALL RESPONSES TO PHQ QUESTIONS 1-9: 0
1. LITTLE INTEREST OR PLEASURE IN DOING THINGS: 0
SUM OF ALL RESPONSES TO PHQ QUESTIONS 1-9: 0
SUM OF ALL RESPONSES TO PHQ QUESTIONS 1-9: 0
2. FEELING DOWN, DEPRESSED OR HOPELESS: 0
2. FEELING DOWN, DEPRESSED OR HOPELESS: 0
SUM OF ALL RESPONSES TO PHQ9 QUESTIONS 1 & 2: 0
1. LITTLE INTEREST OR PLEASURE IN DOING THINGS: 0
SUM OF ALL RESPONSES TO PHQ QUESTIONS 1-9: 0
SUM OF ALL RESPONSES TO PHQ QUESTIONS 1-9: 0

## 2020-10-27 ASSESSMENT — LIFESTYLE VARIABLES
AUDIT-C TOTAL SCORE: INCOMPLETE
HOW OFTEN DO YOU HAVE A DRINK CONTAINING ALCOHOL: 0
HOW OFTEN DO YOU HAVE A DRINK CONTAINING ALCOHOL: NEVER
AUDIT TOTAL SCORE: INCOMPLETE

## 2020-10-27 NOTE — PROGRESS NOTES
Immunization(s) given during visit:    Immunizations Administered     Name Date Dose Route    Influenza, Quadv, adjuvanted, 65 yrs +, IM, PF (Fluad) 10/27/2020 0.5 mL Intramuscular    Site: Deltoid- Right    Lot: [id number]    NDC: 72927-885-86          Most recent Vaccine Information Sheet dated 08.15.2019 given to pt

## 2020-10-27 NOTE — PATIENT INSTRUCTIONS
LAB INSTRUCTIONS:    Please complete labs within 4 week(s). Please fast for 8 hours prior to lab collection. The clinic will call you within 1 week of collection. If you have not heard from us within that amount of time, please call us at [phone]. Personalized Preventive Plan for Venkata Ortega - 10/27/2020  Medicare offers a range of preventive health benefits. Some of the tests and screenings are paid in full while other may be subject to a deductible, co-insurance, and/or copay. Some of these benefits include a comprehensive review of your medical history including lifestyle, illnesses that may run in your family, and various assessments and screenings as appropriate. After reviewing your medical record and screening and assessments performed today your provider may have ordered immunizations, labs, imaging, and/or referrals for you. A list of these orders (if applicable) as well as your Preventive Care list are included within your After Visit Summary for your review. Other Preventive Recommendations:    · A preventive eye exam performed by an eye specialist is recommended every 1-2 years to screen for glaucoma; cataracts, macular degeneration, and other eye disorders. · A preventive dental visit is recommended every 6 months. · Try to get at least 150 minutes of exercise per week or 10,000 steps per day on a pedometer . · Order or download the FREE \"Exercise & Physical Activity: Your Everyday Guide\" from [employer]. Call 0-185.904.7200 or search [employer] online. · You need 8530-1508 mg of calcium and 4245-1496 IU of vitamin D per day. It is possible to meet your calcium requirement with diet alone, but a vitamin D supplement is usually necessary to meet this goal.  · When exposed to the sun, use a sunscreen that protects against both UVA and UVB radiation with an SPF of 30 or greater.  Reapply every 2 to 3 hours or after sweating, drying off with a towel, or swimming. · Always wear a seat belt when traveling in a car. Always wear a helmet when riding a bicycle or motorcycle.

## 2020-10-28 ENCOUNTER — TELEPHONE (OUTPATIENT)
Dept: FAMILY MEDICINE CLINIC | Age: 70
End: 2020-10-28

## 2020-10-28 NOTE — TELEPHONE ENCOUNTER
----- Message from Sherman Gunter DO sent at 10/27/2020  9:39 PM EDT -----  Please let pt know that labs are back. Most is stable and appropriate. K+ mildly low at 3.2. Recommend inc in K+ rich foods, like banana's and other fruits. Plan to repeat level in a wk, non-fasting is fine. Blood sugar also a bit worse than prior, still in prediabetic range, but we'll need to monitor this closely. con't to work on wt loss. Recommend we repeat this lab in 6 months, will call him to get done as time gets closer. Let me know if questions, thanks!

## 2020-11-01 ENCOUNTER — HOSPITAL ENCOUNTER (EMERGENCY)
Age: 70
Discharge: HOME | End: 2020-11-02
Attending: EMERGENCY MEDICINE
Payer: MEDICARE

## 2020-11-01 VITALS
DIASTOLIC BLOOD PRESSURE: 80 MMHG | RESPIRATION RATE: 17 BRPM | OXYGEN SATURATION: 97 % | WEIGHT: 225 LBS | HEIGHT: 69 IN | HEART RATE: 130 BPM | TEMPERATURE: 98.2 F | BODY MASS INDEX: 33.33 KG/M2 | SYSTOLIC BLOOD PRESSURE: 176 MMHG

## 2020-11-01 PROCEDURE — 99283 EMERGENCY DEPT VISIT LOW MDM: CPT

## 2020-11-02 NOTE — ED PROVIDER NOTES
[address]  [address] [facility] [address]  Phone: [phone]    Emergency Department encounter      [address]    Chief Complaint   Patient presents with    Allergic Reaction     to latex bandage from flu shot       SALVATORE Brooks is a 65 y.o. male who presents above-noted complaint. Patient's been doing fine he did get a regular flu shot. Ended getting a Band-Aid on that area and then got a red spot. He was not sure if it got infected or such. He put some Neosporin on it but it itches. Denies other symptoms fever chills or other problems    PAST MEDICAL HISTORY    Past Medical History:   Diagnosis Date    Benign prostatic hyperplasia     Degenerative arthritis of lumbar spine     Diverticulosis     Hyperlipidemia     Hypertension     Insomnia     Obesity     Prediabetes     Tinnitus     Of unknown cause       SURGICAL HISTORY    Past Surgical History:   Procedure Laterality Date    CHOLECYSTECTOMY, LAPAROSCOPIC  2005       CURRENT MEDICATIONS    Current Outpatient Rx   Medication Sig Dispense Refill    Ascorbic Acid (VITAMIN C) 250 MG tablet Take 250 mg by mouth daily      amitriptyline (ELAVIL) 25 MG tablet TAKE 1 TO 2 TABLETS BY MOUTH NIGHTLY 60 tablet 3    lisinopril-hydrochlorothiazide (ZESTORETIC) 10-12.5 MG per tablet Take 1 tablet by mouth daily 90 tablet 3    vitamin D (CHOLECALCIFEROL) 1000 UNIT TABS tablet Take 1,000 Units by mouth daily.          ALLERGIES    Allergies   Allergen Reactions    Penicillins Rash       FAMILY HISTORY    Family History   Problem Relation Age of Onset    Heart Attack Mother     Diabetes Mother        SOCIAL HISTORY    Social History     Socioeconomic History    Marital status:      Spouse name: None    Number of children: None    Years of education: None    Highest education level: None   Occupational History    None   Social Needs    Financial resource strain: None    Food insecurity Worry: None     Inability: None    Transportation needs     Medical: None     Non-medical: None   Tobacco Use    Smoking status: Never Smoker    Smokeless tobacco: Never Used   Substance and Sexual Activity    Alcohol use: No    Drug use: No    Sexual activity: None   Lifestyle    Physical activity     Days per week: None     Minutes per session: None    Stress: None   Relationships    Social connections     Talks on phone: None     Gets together: None     Attends L[employer] day service: None     Active member of club or organization: None     Attends meetings of clubs or organizations: None     Relationship status: None    Intimate partner violence     Fear of current or ex partner: None     Emotionally abused: None     Physically abused: None     Forced sexual activity: None   Other Topics Concern    None   Social History Narrative    None       REVIEW OF SYSTEMS    Positive for rash and itching. No fever chills or drainage. All systems negative except as marked. PHYSICAL EXAM    VITAL SIGNS: BP (!) 176/80   Pulse 130   Temp 98.2 °F (36.8 °C)   Resp 17   Ht 5' 9\" (1.753 m)   Wt 225 lb (102.1 kg)   SpO2 97%   BMI 33.23 kg/m²    Constitutional:  Alert not toxtic or ill, *pleasant alert  HENT:  Normocephalic, Atraumatic  Cervical Spine: Normal range of motion,  No stridor. Eyes:  No discharge or  Swelling  Respiratory: No respiratory distress  Musculoskeletal:  Intact distal pulses, No edema, No tenderness, No cyanosis, No clubbing. Good range of motion in all major joints. No tenderness to palpation or major deformities noted. Integument:  Warm, 4 cm rectangular area over the right triceps area. There is no petechia bullae's or blebs. Neurologic:  Alert & appropriate   Psychiatric:  Affect normal                    RADIOLOGY    No orders to display       PROCEDURES    none      CONSULTS:  None        ED COURSE & MEDICAL DECISION MAKING    Pertinent Labs & Imaging studies reviewed.  (See chart for details)  Patient presents with rash to the right arm. Looks exactly over the area of the [address]. Likely a Band-Aid irritant. He has been using some Neosporin after the inflammation and I doubt this is Neosporin reaction but I told him not to use it. Told put some hydrocortisone on it. He is comfortable with this plan. SCREENINGS  BP (!) 176/80   Pulse 130   Temp 98.2 °F (36.8 °C)   Resp 17   Ht 5' 9\" (1.753 m)   Wt 225 lb (102.1 kg)   SpO2 97%   BMI 33.23 kg/m²      No orders to display       Screening For Hypertension and Follow-up (#317)   previously diagnosed with hypertension and not applicable for screen      Screening For Tobacco Use and Cessation Intervention (#226):   reports that he has never smoked. He has never used smokeless tobacco.  Non-smoker not applicable for screen      FINAL IMPRESSION    1.  Contact allergic reaction         PATIENT REFERRED TO:  Danyel Calvin  [phone]    Call   For evaluation      DISCHARGE MEDICATIONS:  New Prescriptions    No medications on file           Mago Nicolas MD  11/01/20 0317

## 2020-11-02 NOTE — ED NOTES
Discharge teaching and instructions for condition explained to patient. AVS reviewed. Printed prescriptions given to patient. Patient voiced understanding regarding prescriptions, follow up appointments and care of self at home. Pt discharged to home in stable condition per self.        Sachin Polanco RN  11/01/20 2445

## 2020-11-09 ENCOUNTER — NURSE ONLY (OUTPATIENT)
Dept: LAB | Age: 70
End: 2020-11-09

## 2020-11-09 LAB — POTASSIUM SERPL-SCNC: 3.8 MEQ/L (ref 3.5–5.2)

## 2020-11-10 ENCOUNTER — TELEPHONE (OUTPATIENT)
Dept: FAMILY MEDICINE CLINIC | Age: 70
End: 2020-11-10

## 2020-11-10 NOTE — TELEPHONE ENCOUNTER
----- Message from Caitlyn Nathan DO sent at 11/9/2020  9:38 PM EST -----  Please let pt know that f/u K+ is WNL  Let me know if questions, thanks!

## 2021-02-24 RX ORDER — LISINOPRIL AND HYDROCHLOROTHIAZIDE 12.5; 1 MG/1; MG/1
1 TABLET ORAL DAILY
Qty: 90 TABLET | Refills: 3 | Status: SHIPPED | OUTPATIENT
Start: 2021-02-24 | End: 2022-04-11 | Stop reason: SDUPTHER

## 2021-04-30 ENCOUNTER — TELEPHONE (OUTPATIENT)
Dept: FAMILY MEDICINE CLINIC | Age: 71
End: 2021-04-30

## 2021-04-30 DIAGNOSIS — R73.9 HYPERGLYCEMIA: ICD-10-CM

## 2021-04-30 DIAGNOSIS — R73.03 PREDIABETES: Primary | Chronic | ICD-10-CM

## 2021-04-30 NOTE — TELEPHONE ENCOUNTER
Please let pt know that he is due for 6 month f/u labs to monitor his blood sugar    Fasting is fine    Next few wks    Let me know if questions, thanks! Diagnosis Orders   1. Prediabetes  Glucose, random    Hemoglobin A1C   2.  Hyperglycemia  Glucose, random    Hemoglobin A1C

## 2021-05-10 ENCOUNTER — NURSE ONLY (OUTPATIENT)
Dept: LAB | Age: 71
End: 2021-05-10

## 2021-05-10 DIAGNOSIS — R73.9 HYPERGLYCEMIA: ICD-10-CM

## 2021-05-10 DIAGNOSIS — R73.03 PREDIABETES: Chronic | ICD-10-CM

## 2021-05-10 LAB
AVERAGE GLUCOSE: 114 MG/DL (ref 70–126)
GLUCOSE BLD-MCNC: 111 MG/DL (ref 70–108)
HBA1C MFR BLD: 5.8 % (ref 4.4–6.4)

## 2021-05-11 ENCOUNTER — TELEPHONE (OUTPATIENT)
Dept: FAMILY MEDICINE CLINIC | Age: 71
End: 2021-05-11

## 2021-05-11 NOTE — TELEPHONE ENCOUNTER
----- Message from Sanket Fournier, DO sent at 5/10/2021  9:52 PM EDT -----  Please let pt know that f/u labs show improvement. a1c down to 5.8 from 6.3. Recommend repeat labs in 6 months and f/u apt then as well  Will call him to get done as time gets closer  Let me know if questions, thanks!

## 2021-10-15 ENCOUNTER — TELEPHONE (OUTPATIENT)
Dept: FAMILY MEDICINE CLINIC | Age: 71
End: 2021-10-15

## 2021-10-15 DIAGNOSIS — R73.9 HYPERGLYCEMIA: ICD-10-CM

## 2021-10-15 DIAGNOSIS — I10 PRIMARY HYPERTENSION: Primary | Chronic | ICD-10-CM

## 2021-10-15 DIAGNOSIS — R73.03 PREDIABETES: Chronic | ICD-10-CM

## 2021-10-15 NOTE — TELEPHONE ENCOUNTER
Pt due for fasting labs and an apt in NOV     Diagnosis Orders   1. Primary hypertension  CBC Auto Differential    Comprehensive Metabolic Panel    Hemoglobin A1C    Lipid Panel    Microalbumin / Creatinine Urine Ratio    TSH with Reflex   2. Prediabetes  CBC Auto Differential    Comprehensive Metabolic Panel    Hemoglobin A1C    Lipid Panel    Microalbumin / Creatinine Urine Ratio    TSH with Reflex   3.  Hyperglycemia  CBC Auto Differential    Comprehensive Metabolic Panel    Hemoglobin A1C    Lipid Panel    Microalbumin / Creatinine Urine Ratio    TSH with Reflex

## 2021-10-15 NOTE — TELEPHONE ENCOUNTER
Pt informed and verbalized understanding.   Future Appointments   Date Time Provider Shanell Juarez   11/16/2021  3:40 PM Terrie Caller, [address] [facility]

## 2021-10-25 ENCOUNTER — NURSE ONLY (OUTPATIENT)
Dept: LAB | Age: 71
End: 2021-10-25

## 2021-10-25 DIAGNOSIS — I10 PRIMARY HYPERTENSION: Chronic | ICD-10-CM

## 2021-10-25 DIAGNOSIS — I10 PRIMARY HYPERTENSION: Primary | ICD-10-CM

## 2021-10-25 DIAGNOSIS — R73.03 PREDIABETES: Chronic | ICD-10-CM

## 2021-10-25 DIAGNOSIS — R73.9 HYPERGLYCEMIA: ICD-10-CM

## 2021-10-25 LAB
ALBUMIN SERPL-MCNC: 4.2 G/DL (ref 3.5–5.1)
ALP BLD-CCNC: 77 U/L (ref 38–126)
ALT SERPL-CCNC: 19 U/L (ref 11–66)
ANION GAP SERPL CALCULATED.3IONS-SCNC: 14 MEQ/L (ref 8–16)
AST SERPL-CCNC: 23 U/L (ref 5–40)
AVERAGE GLUCOSE: 150 MG/DL (ref 70–126)
BASOPHILS # BLD: 1 %
BASOPHILS ABSOLUTE: 0.1 THOU/MM3 (ref 0–0.1)
BILIRUB SERPL-MCNC: 0.6 MG/DL (ref 0.3–1.2)
BUN BLDV-MCNC: 18 MG/DL (ref 7–22)
CALCIUM SERPL-MCNC: 9.4 MG/DL (ref 8.5–10.5)
CHLORIDE BLD-SCNC: 102 MEQ/L (ref 98–111)
CHOLESTEROL, TOTAL: 185 MG/DL (ref 100–199)
CO2: 24 MEQ/L (ref 23–33)
CREAT SERPL-MCNC: 1 MG/DL (ref 0.4–1.2)
CREATININE, URINE: 281.1 MG/DL
EOSINOPHIL # BLD: 1.2 %
EOSINOPHILS ABSOLUTE: 0.1 THOU/MM3 (ref 0–0.4)
ERYTHROCYTE [DISTWIDTH] IN BLOOD BY AUTOMATED COUNT: 13.3 % (ref 11.5–14.5)
ERYTHROCYTE [DISTWIDTH] IN BLOOD BY AUTOMATED COUNT: 47.2 FL (ref 35–45)
GFR SERPL CREATININE-BSD FRML MDRD: 73 ML/MIN/1.73M2
GLUCOSE BLD-MCNC: 111 MG/DL (ref 70–108)
HBA1C MFR BLD: 7 % (ref 4.4–6.4)
HCT VFR BLD CALC: 48.7 % (ref 42–52)
HDLC SERPL-MCNC: 49 MG/DL
HEMOGLOBIN: 15.8 GM/DL (ref 14–18)
IMMATURE GRANS (ABS): 0.01 THOU/MM3 (ref 0–0.07)
IMMATURE GRANULOCYTES: 0.2 %
LDL CHOLESTEROL CALCULATED: 109 MG/DL
LYMPHOCYTES # BLD: 27.2 %
LYMPHOCYTES ABSOLUTE: 1.4 THOU/MM3 (ref 1–4.8)
MCH RBC QN AUTO: 30.9 PG (ref 26–33)
MCHC RBC AUTO-ENTMCNC: 32.4 GM/DL (ref 32.2–35.5)
MCV RBC AUTO: 95.1 FL (ref 80–94)
MICROALBUMIN UR-MCNC: 1.43 MG/DL
MICROALBUMIN/CREAT UR-RTO: 5 MG/G (ref 0–30)
MONOCYTES # BLD: 12.4 %
MONOCYTES ABSOLUTE: 0.6 THOU/MM3 (ref 0.4–1.3)
NUCLEATED RED BLOOD CELLS: 0 /100 WBC
PLATELET # BLD: 212 THOU/MM3 (ref 130–400)
PMV BLD AUTO: 10.6 FL (ref 9.4–12.4)
POTASSIUM SERPL-SCNC: 3.8 MEQ/L (ref 3.5–5.2)
RBC # BLD: 5.12 MILL/MM3 (ref 4.7–6.1)
SEG NEUTROPHILS: 58 %
SEGMENTED NEUTROPHILS ABSOLUTE COUNT: 3 THOU/MM3 (ref 1.8–7.7)
SODIUM BLD-SCNC: 140 MEQ/L (ref 135–145)
T4 FREE: 1.26 NG/DL (ref 0.93–1.76)
TOTAL PROTEIN: 7.7 G/DL (ref 6.1–8)
TRIGL SERPL-MCNC: 134 MG/DL (ref 0–199)
TSH SERPL DL<=0.05 MIU/L-ACNC: 0.38 UIU/ML (ref 0.4–4.2)
WBC # BLD: 5.2 THOU/MM3 (ref 4.8–10.8)

## 2021-10-26 ENCOUNTER — TELEPHONE (OUTPATIENT)
Dept: FAMILY MEDICINE CLINIC | Age: 71
End: 2021-10-26

## 2021-10-26 NOTE — TELEPHONE ENCOUNTER
----- Message from Alex Bryan, DO sent at 10/25/2021  8:23 PM EDT -----  Please let pt know that labs are back. A few things to review.   -Blood sugar is worse and trending towards the diabetic range. We'll discuss that at his f/u visit in a few wks  Thyroid shows it MAY be a bit overactive. I've ordered a repeat TSH for 4 wks to f/u on this, as it may be transient. Non-fasting is fine. Rest of labs look good. Let me know if questions, thanks!

## 2021-10-26 NOTE — TELEPHONE ENCOUNTER
Pt returned call and was advised of results. He voiced understanding with no further questions. Labs mailed to home address.

## 2021-10-27 ENCOUNTER — TELEPHONE (OUTPATIENT)
Dept: FAMILY MEDICINE CLINIC | Age: 71
End: 2021-10-27

## 2021-11-14 NOTE — PROGRESS NOTES
Chief Complaint   Patient presents with    Medicare AW    Follow-up     chronic  conditions     Follow-up     Abnormal labs       History obtained from the patient. SUBJECTIVE:  Arslan Garza is a 66 y.o. male that presents today for     -Elevated a1c  Noted on labs  Hx of preDM  No hx of DM  Diet worse the last 6 months  Has already started to make changes  Has lost some wt    Lab Results   Component Value Date    LABA1C 7.0 10/25/2021    LABA1C 5.8 05/10/2021    LABA1C 6.3 10/27/2020    LABA1C 5.9 11/03/2018    LABA1C 6.0 11/05/2016    LABA1C 6.2 06/03/2016         -Tinnitus PRIOR VISIT: chronic issue, bilateral. Had for 20 years. No cause. No cure. Used to be pretty bad, but had been ok until 4 months ago. Started to become severe again. Hard to deal with. Denies SI/HI. Has some hearing loss, not new. Has hearing aides, don't help with tinnitus. Used to be on xanax for this, off 10 years, asking to resume to help deal with tinnitus. No vertigo. No headaches. No head trauma. No falls. No changes that he can think of. Hard to sleep and fxn. Last saw spec at [facility] and [facility] 10-20 years ago. Dr. Mary Eugene had on xanax as above. No sI/HI. Mild anxiety. UPDATE TODAY:   Doing better  Coping well  Not needed BZD any more  No dizziness  No HA's      -HTN:    HPI:    Taking meds as prescribed ?: yes  Tolerating well ?: yes  Side Effects ?: denies  BP at home ?: <140/90  Working on TLCS ?: yes  Chest Pain/SOB/Palpitations? denies      -HLD:     HPI: due for labs, labs had improved with diet and exercise. High cohort score yet. Was trialled on crestor previously. However, no longer taking.      Working on TLCS ?: yes      -insomina: on elavil nightly. Working well.  Sleep well with med w/o side effects.       -Decreased TSH:  Noted on labs  F/u labs ordered  No s/s thyroid problems      Age/Gender Health Maintenance    Lipid - ; ; HDL 53;  (MAY 2014)    Lab Results   Component Value Date CHOL 185 10/25/2021    CHOL 205 (H) 10/27/2020    CHOL 186 11/02/2019     Lab Results   Component Value Date    TRIG 134 10/25/2021    TRIG 87 10/27/2020    TRIG 97 11/02/2019     Lab Results   Component Value Date    HDL 49 10/25/2021    HDL 52 10/27/2020    HDL 52 11/02/2019     Lab Results   Component Value Date    LDLCALC 109 10/25/2021    LDLCALC 136 10/27/2020    LDLCALC 115 (H) 11/02/2019       DM Screen - 79 MAY 2014    Lab Results   Component Value Date    GLUCOSE 111 10/25/2021    GLUCOSE 97 11/02/2019     Lab Results   Component Value Date    LABA1C 7.0 10/25/2021    LABA1C 5.8 05/10/2021    LABA1C 6.3 10/27/2020    LABA1C 5.9 11/03/2018    LABA1C 6.0 11/05/2016    LABA1C 6.2 06/03/2016       Colon Cancer Screening - + multiple polyps SEPT 2018, repeat 5 years  Lung Cancer Screening (Age 54 to [de-identified] with 30 pack year hx, current smoker or quit within past 15 years) - never smoker    Tetanus - to get at pharmacy per medicare rules  Influenza Vaccine - UTD FALL 2021  Pneumonia Vaccine - UTD X2 both PPV 23 and PCV 13  Zostavax - UTD 2014  Shingrix - to get at pharmacy per medicare rules    PSA testing discussion - 1.15 MAY 2014  Lab Results   Component Value Date    PSA 1.32 11/02/2019    PSA 1.32 11/03/2018    PSA 1.19 05/05/2018       AAA Screening - never smoker. Current Outpatient Medications   Medication Sig Dispense Refill    amitriptyline (ELAVIL) 25 MG tablet TAKE 1 TO 2 TABLETS BY MOUTH NIGHTLY 60 tablet 3    lisinopril-hydroCHLOROthiazide (ZESTORETIC) 10-12.5 MG per tablet Take 1 tablet by mouth daily 90 tablet 3    Ascorbic Acid (VITAMIN C) 250 MG tablet Take 250 mg by mouth daily      vitamin D (CHOLECALCIFEROL) 1000 UNIT TABS tablet Take 1,000 Units by mouth daily. No current facility-administered medications for this visit. No orders of the defined types were placed in this encounter. All medications reviewed and reconciled, including OTC and herbal medications.  Updated list given to patient. Patient Active Problem List    Diagnosis Date Noted    Hypertension     Tinnitus      Of unknown cause      Obesity     Benign prostatic hyperplasia     Prediabetes     Degenerative arthritis of lumbar spine     Hyperlipidemia     Diverticulosis     Insomnia          Past Medical History:   Diagnosis Date    Benign prostatic hyperplasia     Degenerative arthritis of lumbar spine     Diverticulosis     Hyperlipidemia     Hypertension     Insomnia     Obesity     Prediabetes     Tinnitus     Of unknown cause         Past Surgical History:   Procedure Laterality Date    CHOLECYSTECTOMY, LAPAROSCOPIC  2005         Allergies   Allergen Reactions    Penicillins Rash       Social History     Tobacco Use    Smoking status: Never Smoker    Smokeless tobacco: Never Used   Substance Use Topics    Alcohol use: No       Family History   Problem Relation Age of Onset    Heart Attack Mother     Diabetes Mother          I have reviewed the patient's past medical history, past surgical history, allergies, medications, social and family history and I have made updates where appropriate.       Review of Systems  Positive responses are highlighted in bold    Constitutional:  Fever, Chills, Night Sweats, Fatigue, Unexpected changes in weight  HENT:  Ear pain, Tinnitus, Nosebleeds, Trouble swallowing, Hearing loss, Sore throat  Cardiovascular:  Chest Pain, Palpitations, Orthopnea, Paroxysmal Nocturnal Dyspnea  Respiratory:  Cough, Wheezing, Shortness of breath, Chest tightness, Apnea  Gastrointestinal:  Nausea, Vomiting, Diarrhea, Constipation, Heartburn, Blood in stool  Genitourinary:  Difficulty or painful urination, Flank pain, Change in frequency, Urgency  Skin:  Color change, Rash, Itching, Wound  Musculoskeletal:  Joint pain, Back pain, Gait problems, Joint swelling, Myalgias  Neurological:  Dizziness, Headaches, Presyncope, Numbness, Seizures, Tremors  Endocrine:  Heat Intolerance, Cold Intolerance, Polydipsia, Polyphagia, Polyuria      PHYSICAL EXAM:  Vitals:    11/15/21 1507   BP: 118/74   Pulse: 102   Resp: 12   Temp: 98.4 °F (36.9 °C)   TempSrc: Oral   SpO2: 99%   Weight: 229 lb (103.9 kg)   Height: 5' 9\" (1.753 m)     Body mass index is 33.82 kg/m². Pain Score:   0 - No pain    VS Reviewed  General Appearance: A&O x 3, No acute distress,well developed and well- nourished  Eyes: pupils equal, round, and reactive to light, extraocular eye movements intact, conjunctivae and eye lids without erythema  ENT: external ear and ear canal clear bilaterally, TMs intact and regular, nose without deformity, nasal mucosa and turbinates normal without polyps, oropharynx normal, dentition is normal for age  Neck: supple and non-tender without mass, no thyromegaly or thyroid nodules, no cervical lymphadenopathy  Pulmonary/Chest: clear to auscultation bilaterally- no wheezes, rales or rhonchi, normal air movement, no respiratory distress or retractions  Cardiovascular: S1 and S2 auscultated w/ RRR. No murmurs, rubs, clicks, or gallops, distal pulses intact. Abdomen: soft, non-tender, non-distended, bowl sounds physiologic,  no rebound or guarding, no masses or hernias noted. Liver and spleen without enlargement. Extremities: no cyanosis, clubbing or edema of the lower extremities. Skin: warm and dry, no rash or erythema      ASSESSMENT & PLAN  1. Elevated hemoglobin A1c    High, up to 7.0    Both worse than prior  Pt will work on [employer] and we'll plan to repeat fbs/a1c in 3 months. Will call him to get done as time gets closer. 2. Prediabetes    As above    3. Tinnitus of both ears    Stable  Monitor  If worsens, consider f/u [facility]    4. Essential hypertension    Stable  con't Zestoretic. Labs UTD    5. Pure hypercholesterolemia    Stable  con't diet control  Labs appropriate    6. Primary insomnia    Stable  con't Elavil    7.  Decreased thyroid stimulating hormone (TSH) level    Noted on labs  Likely transient  Plan to repeat TSH again in a few wks, has order      DISPOSITION    Return in about 1 year (around 11/15/2022) for AWV, follow-up on chronic medical conditions, sooner as needed. Padmini Dean released without restrictions. PATIENT COUNSELING    Padmini Josewinston received counseling on the following healthy behaviors: nutrition, exercise and medication adherence    Barriers to learning and self management: none    Discussed use, benefit, and side effects of prescribed medications. Barriers to medication compliance addressed. All patient questions answered. Pt voiced understanding. Electronically signed by Mason Kwan DO on 11/15/2021 at 4:09 PM        Medicare Annual Wellness Visit  Name: Vaishali Hardy Date: 11/15/2021   MRN: [id number] Sex: Male   Age: 66 y.o. Ethnicity: Non- / Non    : 1950 Race: White (non-)      Lora Saxena is here for Medicare AWV, Follow-up (chronic  conditions ), and Follow-up (Abnormal labs)    Screenings for behavioral, psychosocial and functional/safety risks, and cognitive dysfunction are all negative except as indicated below. These results, as well as other patient data from the [address] form, are documented in Flowsheets linked to this Encounter. Allergies   Allergen Reactions    Penicillins Rash         Prior to Visit Medications    Medication Sig Taking? Authorizing Provider   amitriptyline (ELAVIL) 25 MG tablet TAKE 1 TO 2 TABLETS BY MOUTH NIGHTLY Yes STEPHANIE Callahan - TERESA   lisinopril-hydroCHLOROthiazide (ZESTORETIC) 10-12.5 MG per tablet Take 1 tablet by mouth daily Yes Mason Kwan DO   Ascorbic Acid (VITAMIN C) 250 MG tablet Take 250 mg by mouth daily Yes Historical Provider, MD   vitamin D (CHOLECALCIFEROL) 1000 UNIT TABS tablet Take 1,000 Units by mouth daily.  Yes Historical Provider, MD         Past Medical History:   Diagnosis Date    Benign prostatic hyperplasia     Degenerative arthritis of lumbar spine     Diverticulosis     Hyperlipidemia     Hypertension     Insomnia     Obesity     Prediabetes     Tinnitus     Of unknown cause       Past Surgical History:   Procedure Laterality Date    CHOLECYSTECTOMY, LAPAROSCOPIC  2005         Family History   Problem Relation Age of Onset    Heart Attack Mother     Diabetes Mother        CareTeam (Including outside providers/suppliers regularly involved in providing care):   Patient Care Team:  Marques Guillaume DO as PCP - General (Family Medicine)  Marques Guillaume DO as PCP - [facility] Empaneled Provider    Wt Readings from Last 3 Encounters:   11/15/21 229 lb (103.9 kg)   11/01/20 225 lb (102.1 kg)   10/27/20 236 lb 12.8 oz (107.4 kg)     Vitals:    11/15/21 1507   BP: 118/74   Pulse: 102   Resp: 12   Temp: 98.4 °F (36.9 °C)   TempSrc: Oral   SpO2: 99%   Weight: 229 lb (103.9 kg)   Height: 5' 9\" (1.753 m)     Body mass index is 33.82 kg/m². Based upon direct observation of the patient, evaluation of cognition reveals recent and remote memory intact. Patient's complete Health Risk Assessment and screening values have been reviewed and are found in Flowsheets. The following problems were reviewed today and where indicated follow up appointments were made and/or referrals ordered. Positive Risk Factor Screenings with Interventions:            General Health and ACP:  General  In general, how would you say your health is?: Very Good  In the past 7 days, have you experienced any of the following?  New or Increased Pain, New or Increased Fatigue, Loneliness, Social Isolation, Stress or Anger?: (!) Stress  Do you get the social and emotional support that you need?: Yes  Do you have a Living Will?: (!) No  Advance Directives     Power of  Living Will ACP-Advance Directive ACP-Power of     Not on File Not on File Not on File Not on File      General Health Risk Interventions:  · Stress: regular exercise recommended- 3-5 times per week, 30-45 minutes per session  · No Living Will: Patient declines ACP discussion/assistance    Health Habits/Nutrition:  Health Habits/Nutrition  Do you exercise for at least 20 minutes 2-3 times per week?: Yes  Have you lost any weight without trying in the past 3 months?: No  Do you eat only one meal per day?: No  Have you seen the dentist within the past year?: Yes  Body mass index: (!) 33.81  Health Habits/Nutrition Interventions:  · Nutritional issues:  educational materials for healthy, well-balanced diet provided       Personalized Preventive Plan   Current Health Maintenance Status  Immunization History   Administered Date(s) Administered    Influenza Vaccine, unspecified formulation 11/07/2016    Influenza Virus Vaccine 11/07/2015, 11/07/2016, 10/10/2019    Influenza, Quadv, adjuvanted, 65 yrs +, IM, PF (Fluad) 10/27/2020    Influenza, Triv, inactivated, subunit, adjuvanted, IM (Fluad 65 yrs and older) 10/02/2018    Pneumococcal Conjugate 13-valent ([id number]) 11/11/2015    Pneumococcal Polysaccharide ([id number]) 10/02/2014    Zoster Live (Zostavax) 02/26/2014        Health Maintenance   Topic Date Due    COVID-19 Vaccine (1) Never done    DTaP/Tdap/Td vaccine (1 - Tdap) Never done    Shingles Vaccine (2 of 3) 04/23/2014    Flu vaccine (1) 09/01/2021    Annual Wellness Visit (AWV)  10/28/2021    A1C test (Diabetic or Prediabetic)  01/25/2022    Lipid screen  10/25/2022    Potassium monitoring  10/25/2022    Creatinine monitoring  10/25/2022    Colon cancer screen colonoscopy  09/27/2023    Pneumococcal 65+ years Vaccine  Completed    Hepatitis C screen  Completed    Hepatitis A vaccine  Aged Out    Hepatitis B vaccine  Aged Out    Hib vaccine  Aged Out    Meningococcal (ACWY) vaccine  Aged Out     Recommendations for [email or web address] Due: see orders and patient instructions/AVS.  .   Recommended screening schedule for the next 5-10

## 2021-11-15 ENCOUNTER — OFFICE VISIT (OUTPATIENT)
Dept: FAMILY MEDICINE CLINIC | Age: 71
End: 2021-11-15
Payer: MEDICARE

## 2021-11-15 VITALS
HEIGHT: 69 IN | HEART RATE: 102 BPM | OXYGEN SATURATION: 99 % | SYSTOLIC BLOOD PRESSURE: 118 MMHG | DIASTOLIC BLOOD PRESSURE: 74 MMHG | TEMPERATURE: 98.4 F | WEIGHT: 229 LBS | BODY MASS INDEX: 33.92 KG/M2 | RESPIRATION RATE: 12 BRPM

## 2021-11-15 DIAGNOSIS — R79.89 DECREASED THYROID STIMULATING HORMONE (TSH) LEVEL: ICD-10-CM

## 2021-11-15 DIAGNOSIS — I10 ESSENTIAL HYPERTENSION: ICD-10-CM

## 2021-11-15 DIAGNOSIS — F51.01 PRIMARY INSOMNIA: Chronic | ICD-10-CM

## 2021-11-15 DIAGNOSIS — E78.00 PURE HYPERCHOLESTEROLEMIA: ICD-10-CM

## 2021-11-15 DIAGNOSIS — R73.03 PREDIABETES: Chronic | ICD-10-CM

## 2021-11-15 DIAGNOSIS — H93.13 TINNITUS OF BOTH EARS: ICD-10-CM

## 2021-11-15 DIAGNOSIS — R73.09 ELEVATED HEMOGLOBIN A1C: ICD-10-CM

## 2021-11-15 DIAGNOSIS — Z00.00 ROUTINE GENERAL MEDICAL EXAMINATION AT A HEALTH CARE FACILITY: Primary | ICD-10-CM

## 2021-11-15 PROCEDURE — G0439 PPPS, SUBSEQ VISIT: HCPCS | Performed by: FAMILY MEDICINE

## 2021-11-15 PROCEDURE — 99213 OFFICE O/P EST LOW 20 MIN: CPT | Performed by: FAMILY MEDICINE

## 2021-11-15 ASSESSMENT — PATIENT HEALTH QUESTIONNAIRE - PHQ9
1. LITTLE INTEREST OR PLEASURE IN DOING THINGS: 0
2. FEELING DOWN, DEPRESSED OR HOPELESS: 0
SUM OF ALL RESPONSES TO PHQ QUESTIONS 1-9: 0
SUM OF ALL RESPONSES TO PHQ QUESTIONS 1-9: 0
SUM OF ALL RESPONSES TO PHQ9 QUESTIONS 1 & 2: 0
SUM OF ALL RESPONSES TO PHQ QUESTIONS 1-9: 0

## 2021-11-15 ASSESSMENT — LIFESTYLE VARIABLES: HOW OFTEN DO YOU HAVE A DRINK CONTAINING ALCOHOL: 0

## 2021-11-15 NOTE — PATIENT INSTRUCTIONS
Personalized Preventive Plan for Sid Duval - 11/15/2021  Medicare offers a range of preventive health benefits. Some of the tests and screenings are paid in full while other may be subject to a deductible, co-insurance, and/or copay. Some of these benefits include a comprehensive review of your medical history including lifestyle, illnesses that may run in your family, and various assessments and screenings as appropriate. After reviewing your medical record and screening and assessments performed today your provider may have ordered immunizations, labs, imaging, and/or referrals for you. A list of these orders (if applicable) as well as your Preventive Care list are included within your After Visit Summary for your review. Other Preventive Recommendations:    · A preventive eye exam performed by an eye specialist is recommended every 1-2 years to screen for glaucoma; cataracts, macular degeneration, and other eye disorders. · A preventive dental visit is recommended every 6 months. · Try to get at least 150 minutes of exercise per week or 10,000 steps per day on a pedometer . · Order or download the FREE \"Exercise & Physical Activity: Your Everyday Guide\" from [employer]. Call 4-734.444.7836 or search [employer] online. · You need 8099-8133 mg of calcium and 1825-5090 IU of vitamin D per day. It is possible to meet your calcium requirement with diet alone, but a vitamin D supplement is usually necessary to meet this goal.  · When exposed to the sun, use a sunscreen that protects against both UVA and UVB radiation with an SPF of 30 or greater. Reapply every 2 to 3 hours or after sweating, drying off with a towel, or swimming. · Always wear a seat belt when traveling in a car. Always wear a helmet when riding a bicycle or motorcycle. Patient Education        Learning About the Mediterranean Diet  What is the [address]?      The [address] diet is a style of eating rather than a diet plan. It features foods eaten in [place], [place], [place] and [place], and other countries along the [facility]. It emphasizes eating foods like fish, fruits, vegetables, beans, high-fiber breads and whole grains, nuts, and olive oil. This style of eating includes limited red meat, cheese, and sweets. Why choose the Mediterranean diet? A Mediterranean-style diet may improve heart health. It contains more fat than other heart-healthy diets. But the fats are mainly from nuts, unsaturated oils (such as fish oils and olive oil), and certain nut or seed oils (such as canola, soybean, or flaxseed oil). These fats may help protect the heart and blood vessels. How can you get started on the Mediterranean diet? Here are some things you can do to switch to a more Mediterranean way of eating. What to eat  Eat a variety of fruits and vegetables each day, such as grapes, blueberries, tomatoes, broccoli, peppers, figs, olives, spinach, eggplant, beans, lentils, and chickpeas. Eat a variety of whole-grain foods each day, such as oats, brown rice, and whole wheat bread, pasta, and couscous. Eat fish at least 2 times a week. Try tuna, salmon, mackerel, lake trout, herring, or sardines. Eat moderate amounts of low-fat dairy products, such as milk, cheese, or yogurt. Eat moderate amounts of poultry and eggs. Choose healthy (unsaturated) fats, such as nuts, olive oil, and certain nut or seed oils like canola, soybean, and flaxseed. Limit unhealthy (saturated) fats, such as butter, palm oil, and coconut oil. And limit fats found in animal products, such as meat and dairy products made with whole milk. Try to eat red meat only a few times a month in very small amounts. Limit sweets and desserts to only a few times a week. This includes sugar-sweetened drinks like soda.   The Mediterranean diet may also include red wine with your meal1 glass each day for women and up to 2 glasses a day for men. Tips for eating at home  Use herbs, spices, garlic, lemon zest, and citrus juice instead of salt to add flavor to foods. Add avocado slices to your sandwich instead of mccord. Have fish for lunch or dinner instead of red meat. Brush the fish with olive oil, and broil or grill it. Sprinkle your salad with seeds or nuts instead of cheese. Cook with olive or canola oil instead of butter or oils that are high in saturated fat. Switch from 2% milk or whole milk to 1% or fat-free milk. Dip raw vegetables in a vinaigrette dressing or hummus instead of dips made from mayonnaise or sour cream.  Have a piece of fruit for dessert instead of a piece of cake. Try baked apples, or have some dried fruit. Tips for eating out  Try broiled, grilled, baked, or poached fish instead of having it fried or breaded. Ask your  to have your meals prepared with olive oil instead of butter. Order dishes made with marinara sauce or sauces made from olive oil. Avoid sauces made from cream or mayonnaise. Choose whole-grain breads, whole wheat pasta and pizza crust, brown rice, beans, and lentils. Cut back on butter or margarine on bread. Instead, you can dip your bread in a small amount of olive oil. Ask for a side salad or grilled vegetables instead of french fries or chips. Where can you learn more? Go to https://Scoutmobnirmal.StoreAge. org and sign in to your [employer] account. Enter [phone] in the [facility] box to learn more about \"Learning About the Mediterranean Diet. \"     If you do not have an account, please click on the \"Sign Up Now\" link. Current as of: December 17, 2020               Content Version: 13.0  © 2006-2021 Healthwise, Incorporated. Care instructions adapted under license by [facility] ([facility]).  If you have questions about a medical condition or this instruction, always ask your healthcare professional. [place] [address] any warranty or liability for your use of this information.

## 2021-11-18 ENCOUNTER — NURSE ONLY (OUTPATIENT)
Dept: LAB | Age: 71
End: 2021-11-18

## 2021-11-18 DIAGNOSIS — I10 PRIMARY HYPERTENSION: ICD-10-CM

## 2021-11-18 LAB — TSH SERPL DL<=0.05 MIU/L-ACNC: 0.67 UIU/ML (ref 0.4–4.2)

## 2021-11-19 ENCOUNTER — TELEPHONE (OUTPATIENT)
Dept: FAMILY MEDICINE CLINIC | Age: 71
End: 2021-11-19

## 2021-11-19 NOTE — TELEPHONE ENCOUNTER
----- Message from Carlie Lim DO sent at 11/18/2021  7:30 PM EST -----  Please let pt know that f/u thyroid fxn is wnl  Let me know if questions, thanks!

## 2021-12-03 DIAGNOSIS — F51.01 PRIMARY INSOMNIA: Chronic | ICD-10-CM

## 2021-12-03 RX ORDER — AMITRIPTYLINE HYDROCHLORIDE 25 MG/1
TABLET, FILM COATED ORAL
Qty: 60 TABLET | Refills: 3 | Status: SHIPPED | OUTPATIENT
Start: 2021-12-03 | End: 2022-07-05

## 2021-12-29 ENCOUNTER — TELEPHONE (OUTPATIENT)
Dept: FAMILY MEDICINE CLINIC | Age: 71
End: 2021-12-29

## 2021-12-29 DIAGNOSIS — R73.9 HYPERGLYCEMIA: ICD-10-CM

## 2021-12-29 DIAGNOSIS — R73.03 PREDIABETES: Primary | ICD-10-CM

## 2021-12-29 NOTE — TELEPHONE ENCOUNTER
Called pt and advised him of message. He states he will wait to get the labs done. He voiced understanding.

## 2021-12-29 NOTE — TELEPHONE ENCOUNTER
----- Message from Jamel Tran sent at 12/29/2021  2:25 PM EST -----  Subject: Referral Request    QUESTIONS   Reason for referral request? Pt is needing another blood test done A1C and   blood Sugar to check his blood sugar levels   Has the physician seen you for this condition before? Yes  Select a date? 2021-11-18  Select the Provider the patient wants to be referred to, if known (PCP or   Specialist)? Rosio Osman   Preferred Specialist (if applicable)? Do you already have an appointment scheduled? No  Additional Information for Provider? Patient would like to get a copy of   the new blood test as well after its completed   ---------------------------------------------------------------------------  --------------  CALL BACK INFO  What is the best way for the office to contact you? OK to leave message on   voicemail  Preferred Call Back Phone Number?  [phone]

## 2021-12-29 NOTE — TELEPHONE ENCOUNTER
Ok, he's not due until end of January, after the 25th    We were going to call him a little closer to that time    However, will go ahead an order    Plan to get done, fasting, on or after 25 JAN 2022     Diagnosis Orders   1. Prediabetes  Glucose, random    Hemoglobin A1C   2.  Hyperglycemia  Glucose, random    Hemoglobin A1C       Lab Results   Component Value Date    LABA1C 7.0 10/25/2021    LABA1C 5.8 05/10/2021    LABA1C 6.3 10/27/2020    LABA1C 5.9 11/03/2018    LABA1C 6.0 11/05/2016    LABA1C 6.2 06/03/2016

## 2022-02-10 ENCOUNTER — NURSE ONLY (OUTPATIENT)
Dept: LAB | Age: 72
End: 2022-02-10

## 2022-02-10 DIAGNOSIS — R73.9 HYPERGLYCEMIA: ICD-10-CM

## 2022-02-10 DIAGNOSIS — R73.03 PREDIABETES: ICD-10-CM

## 2022-02-10 DIAGNOSIS — R73.9 HYPERGLYCEMIA: Primary | ICD-10-CM

## 2022-02-10 LAB
AVERAGE GLUCOSE: 120 MG/DL (ref 70–126)
GLUCOSE BLD-MCNC: 100 MG/DL (ref 70–108)
HBA1C MFR BLD: 6 % (ref 4.4–6.4)

## 2022-02-11 ENCOUNTER — TELEPHONE (OUTPATIENT)
Dept: FAMILY MEDICINE CLINIC | Age: 72
End: 2022-02-11

## 2022-02-11 NOTE — TELEPHONE ENCOUNTER
----- Message from Hieu Tate DO sent at 2/10/2022  8:40 PM EST -----  Please let pt know that blood sugar sig improved. A1c down from 7 to 6  Con't good diet changes  Would like to repeat labs again in 3 months, fasting. Let me know if questions, thanks!

## 2022-02-11 NOTE — TELEPHONE ENCOUNTER
Pt's wife informed and understanding    Pt's wife stated that if she forgets the message she will have pt call back

## 2022-04-11 RX ORDER — LISINOPRIL AND HYDROCHLOROTHIAZIDE 12.5; 1 MG/1; MG/1
1 TABLET ORAL DAILY
Qty: 90 TABLET | Refills: 3 | Status: SHIPPED | OUTPATIENT
Start: 2022-04-11

## 2022-04-11 NOTE — TELEPHONE ENCOUNTER
----- Message from Parag Vang sent at 4/11/2022  9:12 AM EDT -----  Subject: Refill Request    QUESTIONS  Name of Medication? lisinopril-hydroCHLOROthiazide (ZESTORETIC) 10-12.5 MG   per tablet  Patient-reported dosage and instructions? half a pill a day  How many days do you have left? 0  Preferred Pharmacy? [address] phone number (if available)? [phone]  ---------------------------------------------------------------------------  --------------  CALL BACK INFO  What is the best way for the office to contact you? OK to leave message on   voicemail  Preferred Call Back Phone Number? [phone]  ---------------------------------------------------------------------------  --------------  SCRIPT ANSWERS  Relationship to Patient?  Self  Please approve or refuse Rx request:  Requested Prescriptions     Pending Prescriptions Disp Refills    lisinopril-hydroCHLOROthiazide (ZESTORETIC) 10-12.5 MG per tablet 90 tablet 3     Sig: Take 1 tablet by mouth daily       Next appointment:  Visit date not found

## 2022-05-27 ENCOUNTER — OFFICE VISIT (OUTPATIENT)
Dept: FAMILY MEDICINE CLINIC | Age: 72
End: 2022-05-27
Payer: MEDICARE

## 2022-05-27 VITALS
BODY MASS INDEX: 30.16 KG/M2 | OXYGEN SATURATION: 97 % | HEART RATE: 79 BPM | RESPIRATION RATE: 14 BRPM | DIASTOLIC BLOOD PRESSURE: 60 MMHG | SYSTOLIC BLOOD PRESSURE: 110 MMHG | WEIGHT: 203.6 LBS | HEIGHT: 69 IN | TEMPERATURE: 97.9 F

## 2022-05-27 DIAGNOSIS — F43.0 ACUTE STRESS REACTION: Primary | ICD-10-CM

## 2022-05-27 DIAGNOSIS — F43.21 GRIEF: ICD-10-CM

## 2022-05-27 DIAGNOSIS — H93.13 TINNITUS OF BOTH EARS: Chronic | ICD-10-CM

## 2022-05-27 DIAGNOSIS — F41.9 ANXIETY: ICD-10-CM

## 2022-05-27 PROCEDURE — 1123F ACP DISCUSS/DSCN MKR DOCD: CPT | Performed by: NURSE PRACTITIONER

## 2022-05-27 PROCEDURE — 99215 OFFICE O/P EST HI 40 MIN: CPT | Performed by: NURSE PRACTITIONER

## 2022-05-27 RX ORDER — CLONAZEPAM 0.5 MG/1
0.5 TABLET ORAL 2 TIMES DAILY PRN
Qty: 14 TABLET | Refills: 0 | Status: SHIPPED | OUTPATIENT
Start: 2022-05-27 | End: 2022-06-08 | Stop reason: SDUPTHER

## 2022-05-27 NOTE — PROGRESS NOTES
[address] [address] [address] [place] [address]  Dept: [phone]  Loc: [phone]  Visit Date: 5/27/2022      Chief Complaint:   Lotus Olmedo is a 66 y.o. male thatpresents for Other (pt lost wife on monday and dog on thrusday )    HPI:  Anxiety     Inciting events or triggers for anxiety - recent wife and dog's death this week   Frequency of anxiety - daily  Panic attacks? No  Symptoms of panic attacks -  N/A  Sleep Disturbances? Yes - always had trouble with this, takes Elavil, works okay for them  Impaired concentration? Yes  Substance abuse? No  Suicidal/Homicidal Ideation? Yes - has in the past, this week has had \"fleeting thoughts\" of it, no plan, says he would never commit suicide because he doesn't want to risk not going to heaven to be with this wife. Says he has friends and clergy to lean on and has been doing so. Compliant with meds: yes, takes Elavil for sleep, nothing currently for anxiety, but has had to take Clonazepam and Ativan in the past for anxiety. Did not like Ativan, was hard to come off of due to how long he was on it. Did well with Clonazepam, only took it short term prn. Med side effects: No    Sees therapist?: No  Family History of Mental Illness? No    The patient comes in alone today. History obtained from pt and medical records. I have reviewed the patient's past medical history, past surgical history, allergies,medications, social and family history and I have made updates where appropriate.     Past Medical History:   Diagnosis Date    Benign prostatic hyperplasia     Degenerative arthritis of lumbar spine     Diverticulosis     Hyperlipidemia     Hypertension     Insomnia     Obesity     Prediabetes     Tinnitus     Of unknown cause       Past Surgical History:   Procedure Laterality Date    CHOLECYSTECTOMY, LAPAROSCOPIC  2005       Social History     Tobacco Use    Smoking status: Never Smoker  Smokeless tobacco: Never Used   Vaping Use    Vaping Use: Never used   Substance Use Topics    Alcohol use: No    Drug use: No       Family History   Problem Relation Age of Onset    Heart Attack Mother     Diabetes Mother          Review of Systems          PHYSICAL EXAM:  /60   Pulse 79   Temp 97.9 °F (36.6 °C) (Oral)   Resp 14   Ht 5' 9\" (1.753 m)   Wt 203 lb 9.6 oz (92.4 kg)   SpO2 97%   BMI 30.07 kg/m²     Physical Exam  Constitutional:       Appearance: Normal appearance. HENT:      Head: Normocephalic and atraumatic. Right Ear: External ear normal.      Left Ear: External ear normal.      Nose: Nose normal.      Mouth/Throat:      Mouth: Mucous membranes are moist.   Eyes:      Conjunctiva/sclera: Conjunctivae normal.   Cardiovascular:      Rate and Rhythm: Normal rate and regular rhythm. Pulses: Normal pulses. Heart sounds: Normal heart sounds. Pulmonary:      Effort: Pulmonary effort is normal.      Breath sounds: Normal breath sounds. Abdominal:      General: Bowel sounds are normal.      Palpations: Abdomen is soft. Musculoskeletal:         General: Normal range of motion. Cervical back: Normal range of motion. Skin:     General: Skin is warm and dry. Neurological:      General: No focal deficit present. Mental Status: He is alert and oriented to person, place, and time. Psychiatric:         Mood and Affect: Mood normal. Affect is tearful. Behavior: Behavior normal.         Thought Content: Thought content normal. Thought content does not include homicidal or suicidal plan. Judgment: Judgment normal.             ASSESSMENT & PLAN  Forrest Avila was seen today for other. Diagnoses and all orders for this visit:    Acute stress reaction  -     clonazePAM (KLONOPIN) 0.5 MG tablet; Take 1 tablet by mouth 2 times daily as needed for Anxiety for up to 7 days.     Tinnitus of both ears    Anxiety  -     clonazePAM (KLONOPIN) 0.5 MG tablet; Take 1 tablet by mouth 2 times daily as needed for Anxiety for up to 7 days. Grief  -     clonazePAM (KLONOPIN) 0.5 MG tablet; Take 1 tablet by mouth 2 times daily as needed for Anxiety for up to 7 days. Walk in Tuesday for recheck  ER precautions given, verbalized understanding. If he would have SI again, he will go to [address] [place] [address] ER for further treatment    No follow-ups on file. I spent 50 minutes reviewing previous notes and testing, discussing symptoms, medications and side effects, treatment options with the patient, performing an exam, and developing a plan of care. All questions answered. Patient verbalized understanding. Joanne Lizama received counseling on the following healthy behaviors: medication adherence  Reviewedprior labs and health maintenance. Continue current medications, diet and exercise. Discussed use, benefit, and side effects of prescribed medications. Barriers to medication compliance addressed. Patient given educationalmaterials - see patient instructions. All patient questions answered. Patient voiced understanding.      Electronically signed by STEPHANIE Troy - CNP, APRN on 5/27/22 at 3:03 PM EDT

## 2022-05-31 ENCOUNTER — TELEPHONE (OUTPATIENT)
Dept: FAMILY MEDICINE CLINIC | Age: 72
End: 2022-05-31

## 2022-05-31 NOTE — TELEPHONE ENCOUNTER
Patient calling stating that he saw Anahiruben Rang [phone] due to his wife passing away the Monday before and then his dog  the following Thursday   Patient states that Jordy Walker put him on clonazepam 0.5 mg po bid but he is in need of something a bit more stronger but not addicting   Patient states that he is have a hard time right now trying to deal with things and could use something and states that he was told about buspar and wanted to know what the providers thoughts were on buspar

## 2022-05-31 NOTE — TELEPHONE ENCOUNTER
Patient has been informed and voiced understanding but can not come in to the office due to wife's celebration of life tomorrow and a lot of other things going on.   Patient did states that he would like to see Dr Radha Shaffer so I put him on Dr Dianne Silva schedule on 06.08.2022 in a SDF-1 slot at 1100 am

## 2022-06-07 NOTE — PROGRESS NOTES
labs, labs had improved with diet and exercise. High cohort score yet. Was trialled on crestor previously. However, no longer taking.      Working on TLCS ?: yes      Age/Gender Health Maintenance    Lipid - ; ; HDL 53;  (MAY 2014)    Lab Results   Component Value Date    CHOL 185 10/25/2021    CHOL 205 (H) 10/27/2020    CHOL 186 11/02/2019     Lab Results   Component Value Date    TRIG 134 10/25/2021    TRIG 87 10/27/2020    TRIG 97 11/02/2019     Lab Results   Component Value Date    HDL 49 10/25/2021    HDL 52 10/27/2020    HDL 52 11/02/2019     Lab Results   Component Value Date    LDLCALC 109 10/25/2021    LDLCALC 136 10/27/2020    LDLCALC 115 (H) 11/02/2019       DM Screen - 77 MAY 2014    Lab Results   Component Value Date    GLUCOSE 100 02/10/2022    GLUCOSE 97 11/02/2019     Lab Results   Component Value Date    LABA1C 6.0 02/10/2022    LABA1C 7.0 10/25/2021    LABA1C 5.8 05/10/2021    LABA1C 6.3 10/27/2020    LABA1C 5.9 11/03/2018    LABA1C 6.0 11/05/2016       Colon Cancer Screening - + multiple polyps SEPT 2018, repeat 5 years  Lung Cancer Screening (Age 54 to [de-identified] with 30 pack year hx, current smoker or quit within past 15 years) - never smoker    Tetanus - to get at pharmacy per medicare rules  Influenza Vaccine - UTD FALL 2021  Pneumonia Vaccine - UTD X2 both PPV 23 and PCV 13  Zostavax - UTD 2014  Shingrix - to get at pharmacy per medicare rules    PSA testing discussion - 1.15 MAY 2014  Lab Results   Component Value Date    PSA 1.32 11/02/2019    PSA 1.32 11/03/2018    PSA 1.19 05/05/2018       AAA Screening - never smoker. Current Outpatient Medications   Medication Sig Dispense Refill    clonazePAM (KLONOPIN) 0.5 MG tablet Take 1 tablet by mouth 2 times daily as needed for Anxiety for up to 14 days.  28 tablet 0    busPIRone (BUSPAR) 7.5 MG tablet Take 1 tablet by mouth 2 times daily 60 tablet 2    lisinopril-hydroCHLOROthiazide (ZESTORETIC) 10-12.5 MG per tablet Take 1 tablet by mouth daily 90 tablet 3    amitriptyline (ELAVIL) 25 MG tablet TAKE 1 TO 2 TABLETS BY MOUTH NIGHTLY 60 tablet 3    Ascorbic Acid (VITAMIN C) 250 MG tablet Take 250 mg by mouth daily      vitamin D (CHOLECALCIFEROL) 1000 UNIT TABS tablet Take 1,000 Units by mouth daily. No current facility-administered medications for this visit. Orders Placed This Encounter   Medications    clonazePAM (KLONOPIN) 0.5 MG tablet     Sig: Take 1 tablet by mouth 2 times daily as needed for Anxiety for up to 14 days. Dispense:  28 tablet     Refill:  0    busPIRone (BUSPAR) 7.5 MG tablet     Sig: Take 1 tablet by mouth 2 times daily     Dispense:  60 tablet     Refill:  2         All medications reviewed and reconciled, including OTC and herbal medications. Updated list given to patient. Patient Active Problem List    Diagnosis Date Noted    Hypertension     Tinnitus      Of unknown cause      Obesity     Benign prostatic hyperplasia     Prediabetes     Degenerative arthritis of lumbar spine     Hyperlipidemia     Diverticulosis     Insomnia          Past Medical History:   Diagnosis Date    Benign prostatic hyperplasia     Degenerative arthritis of lumbar spine     Diverticulosis     Hyperlipidemia     Hypertension     Insomnia     Obesity     Prediabetes     Tinnitus     Of unknown cause         Past Surgical History:   Procedure Laterality Date    CHOLECYSTECTOMY, LAPAROSCOPIC  2005         Allergies   Allergen Reactions    Penicillins Rash       Social History     Tobacco Use    Smoking status: Never Smoker    Smokeless tobacco: Never Used   Substance Use Topics    Alcohol use: No       Family History   Problem Relation Age of Onset    Heart Attack Mother     Diabetes Mother          I have reviewed the patient's past medical history, past surgical history, allergies, medications, social and family history and I have made updates where appropriate.       Review of Systems  Positive responses are highlighted in bold    Constitutional:  Fever, Chills, Night Sweats, Fatigue, Unexpected changes in weight  HENT:  Ear pain, Tinnitus, Nosebleeds, Trouble swallowing, Hearing loss, Sore throat  Cardiovascular:  Chest Pain, Palpitations, Orthopnea, Paroxysmal Nocturnal Dyspnea  Respiratory:  Cough, Wheezing, Shortness of breath, Chest tightness, Apnea  Gastrointestinal:  Nausea, Vomiting, Diarrhea, Constipation, Heartburn, Blood in stool  Genitourinary:  Difficulty or painful urination, Flank pain, Change in frequency, Urgency  Skin:  Color change, Rash, Itching, Wound  Musculoskeletal:  Joint pain, Back pain, Gait problems, Joint swelling, Myalgias  Neurological:  Dizziness, Headaches, Presyncope, Numbness, Seizures, Tremors  Endocrine:  Heat Intolerance, Cold Intolerance, Polydipsia, Polyphagia, Polyuria      PHYSICAL EXAM:  Vitals:    06/08/22 1059   BP: 122/68   Pulse: 73   Resp: 12   Temp: 97.7 °F (36.5 °C)   TempSrc: Oral   SpO2: 98%   Weight: 206 lb (93.4 kg)     Body mass index is 30.42 kg/m². VS Reviewed  General Appearance: A&O x 3, No acute distress,well developed and well- nourished  Eyes: pupils equal, round, and reactive to light, extraocular eye movements intact, conjunctivae and eye lids without erythema  ENT: external ear and ear canal clear bilaterally, TMs intact and regular, nose without deformity, nasal mucosa and turbinates normal without polyps, oropharynx normal, dentition is normal for age  Neck: supple and non-tender without mass, no thyromegaly or thyroid nodules, no cervical lymphadenopathy  Pulmonary/Chest: clear to auscultation bilaterally- no wheezes, rales or rhonchi, normal air movement, no respiratory distress or retractions  Cardiovascular: S1 and S2 auscultated w/ RRR. No murmurs, rubs, clicks, or gallops, distal pulses intact.   Abdomen: soft, non-tender, non-distended, bowl sounds physiologic,  no rebound or guarding, no masses or hernias noted. Liver and spleen without enlargement. Extremities: no cyanosis, clubbing or edema of the lower extremities. Skin: warm and dry, no rash or erythema  Psych: Affect appropriate. Mood anxious. Thought process is normal without evidence of depression or psychosis. Good insight and appropriate interaction. Cognition and memory appear to be intact. ASSESSMENT & PLAN  1. Acute stress reaction    Doing some better  Ok for short course of klonopin to con't   Look to wean in 4 wks  Agree with adding buspar  con't elavil  F/u 4 wks    OAARS reviewed and appropriate. Controlled Substances Monitoring:     Periodic Controlled Substance Monitoring: Possible medication side effects, risk of tolerance/dependence & alternative treatments discussed. ,No signs of potential drug abuse or diversion identified. Lencho Jhaveri DO)    - clonazePAM (KLONOPIN) 0.5 MG tablet; Take 1 tablet by mouth 2 times daily as needed for Anxiety for up to 14 days. Dispense: 28 tablet; Refill: 0  - busPIRone (BUSPAR) 7.5 MG tablet; Take 1 tablet by mouth 2 times daily  Dispense: 60 tablet; Refill: 2    2. Anxiety    As above    - clonazePAM (KLONOPIN) 0.5 MG tablet; Take 1 tablet by mouth 2 times daily as needed for Anxiety for up to 14 days. Dispense: 28 tablet; Refill: 0  - busPIRone (BUSPAR) 7.5 MG tablet; Take 1 tablet by mouth 2 times daily  Dispense: 60 tablet; Refill: 2    3. Grief    As above    - clonazePAM (KLONOPIN) 0.5 MG tablet; Take 1 tablet by mouth 2 times daily as needed for Anxiety for up to 14 days. Dispense: 28 tablet; Refill: 0  - busPIRone (BUSPAR) 7.5 MG tablet; Take 1 tablet by mouth 2 times daily  Dispense: 60 tablet; Refill: 2    4. Primary insomnia    As above    5. Prediabetes    Doing better  con't lifestyle changes  Reminded to get labs done when able, has order    6. Hyperglycemia      7. Tinnitus of both ears    Stable  Monitor  If worsens, consider f/u [facility]    8.  Essential hypertension    Stable  con't Zestoretic. Labs UTD    9. Pure hypercholesterolemia    Stable  con't diet control  Labs appropriate      DISPOSITION    Return in about 4 weeks (around 7/6/2022) for f/u grief/anxiety, sooner as needed. Julee Isbell released without restrictions. PATIENT COUNSELING    Julee Isbell received counseling on the following healthy behaviors: nutrition, exercise and medication adherence    Barriers to learning and self management: none    Discussed use, benefit, and side effects of prescribed medications. Barriers to medication compliance addressed. All patient questions answered. Pt voiced understanding.        Electronically signed by Quentin Hurtado DO on 6/8/2022 at 11:30 AM

## 2022-06-08 ENCOUNTER — OFFICE VISIT (OUTPATIENT)
Dept: FAMILY MEDICINE CLINIC | Age: 72
End: 2022-06-08
Payer: MEDICARE

## 2022-06-08 VITALS
RESPIRATION RATE: 12 BRPM | SYSTOLIC BLOOD PRESSURE: 122 MMHG | TEMPERATURE: 97.7 F | HEART RATE: 73 BPM | DIASTOLIC BLOOD PRESSURE: 68 MMHG | OXYGEN SATURATION: 98 % | WEIGHT: 206 LBS | BODY MASS INDEX: 30.42 KG/M2

## 2022-06-08 DIAGNOSIS — F41.9 ANXIETY: ICD-10-CM

## 2022-06-08 DIAGNOSIS — R73.9 HYPERGLYCEMIA: ICD-10-CM

## 2022-06-08 DIAGNOSIS — E78.00 PURE HYPERCHOLESTEROLEMIA: ICD-10-CM

## 2022-06-08 DIAGNOSIS — H93.13 TINNITUS OF BOTH EARS: ICD-10-CM

## 2022-06-08 DIAGNOSIS — R73.03 PREDIABETES: ICD-10-CM

## 2022-06-08 DIAGNOSIS — I10 ESSENTIAL HYPERTENSION: ICD-10-CM

## 2022-06-08 DIAGNOSIS — F51.01 PRIMARY INSOMNIA: ICD-10-CM

## 2022-06-08 DIAGNOSIS — F43.0 ACUTE STRESS REACTION: Primary | ICD-10-CM

## 2022-06-08 DIAGNOSIS — F43.21 GRIEF: ICD-10-CM

## 2022-06-08 PROCEDURE — 1123F ACP DISCUSS/DSCN MKR DOCD: CPT | Performed by: FAMILY MEDICINE

## 2022-06-08 PROCEDURE — 99214 OFFICE O/P EST MOD 30 MIN: CPT | Performed by: FAMILY MEDICINE

## 2022-06-08 RX ORDER — CLONAZEPAM 0.5 MG/1
0.5 TABLET ORAL 2 TIMES DAILY PRN
Qty: 28 TABLET | Refills: 0 | Status: SHIPPED | OUTPATIENT
Start: 2022-06-08 | End: 2022-08-04

## 2022-06-08 RX ORDER — BUSPIRONE HYDROCHLORIDE 7.5 MG/1
7.5 TABLET ORAL 2 TIMES DAILY
Qty: 60 TABLET | Refills: 2 | Status: SHIPPED | OUTPATIENT
Start: 2022-06-08 | End: 2022-09-01

## 2022-07-03 ENCOUNTER — APPOINTMENT (OUTPATIENT)
Dept: CT IMAGING | Age: 72
End: 2022-07-03
Payer: MEDICARE

## 2022-07-03 ENCOUNTER — HOSPITAL ENCOUNTER (EMERGENCY)
Age: 72
Discharge: HOME | End: 2022-07-03
Attending: EMERGENCY MEDICINE
Payer: MEDICARE

## 2022-07-03 VITALS
RESPIRATION RATE: 13 BRPM | TEMPERATURE: 97.8 F | SYSTOLIC BLOOD PRESSURE: 139 MMHG | HEART RATE: 60 BPM | BODY MASS INDEX: 27.09 KG/M2 | WEIGHT: 200 LBS | OXYGEN SATURATION: 100 % | HEIGHT: 72 IN | DIASTOLIC BLOOD PRESSURE: 84 MMHG

## 2022-07-03 DIAGNOSIS — G45.9 TIA (TRANSIENT ISCHEMIC ATTACK): Primary | ICD-10-CM

## 2022-07-03 DIAGNOSIS — H53.461 HEMIANOPIA OF RIGHT EYE: ICD-10-CM

## 2022-07-03 LAB
ALBUMIN SERPL-MCNC: 4.1 G/DL (ref 3.5–5.1)
ALP BLD-CCNC: 67 U/L (ref 38–126)
ALT SERPL-CCNC: 13 U/L (ref 11–66)
ANION GAP SERPL CALCULATED.3IONS-SCNC: 11 MEQ/L (ref 8–16)
APTT: 31.4 SECONDS (ref 22–38)
AST SERPL-CCNC: 15 U/L (ref 5–40)
BASOPHILS # BLD: 0.6 %
BASOPHILS ABSOLUTE: 0 THOU/MM3 (ref 0–0.1)
BILIRUB SERPL-MCNC: 0.5 MG/DL (ref 0.3–1.2)
BUN BLDV-MCNC: 17 MG/DL (ref 7–22)
CALCIUM SERPL-MCNC: 9.2 MG/DL (ref 8.5–10.5)
CHLORIDE BLD-SCNC: 103 MEQ/L (ref 98–111)
CO2: 26 MEQ/L (ref 23–33)
CREAT SERPL-MCNC: 1 MG/DL (ref 0.4–1.2)
EOSINOPHIL # BLD: 0.6 %
EOSINOPHILS ABSOLUTE: 0 THOU/MM3 (ref 0–0.4)
ERYTHROCYTE [DISTWIDTH] IN BLOOD BY AUTOMATED COUNT: 13.7 % (ref 11.5–14.5)
ERYTHROCYTE [DISTWIDTH] IN BLOOD BY AUTOMATED COUNT: 48 FL (ref 35–45)
GFR SERPL CREATININE-BSD FRML MDRD: 73 ML/MIN/1.73M2
GLUCOSE BLD-MCNC: 133 MG/DL (ref 70–108)
HCT VFR BLD CALC: 43.5 % (ref 42–52)
HEMOGLOBIN: 14.1 GM/DL (ref 14–18)
IMMATURE GRANS (ABS): 0.03 THOU/MM3 (ref 0–0.07)
IMMATURE GRANULOCYTES: 0.4 %
INR BLD: 1.09 (ref 0.85–1.13)
LYMPHOCYTES # BLD: 25.4 %
LYMPHOCYTES ABSOLUTE: 2 THOU/MM3 (ref 1–4.8)
MCH RBC QN AUTO: 30.9 PG (ref 26–33)
MCHC RBC AUTO-ENTMCNC: 32.4 GM/DL (ref 32.2–35.5)
MCV RBC AUTO: 95.2 FL (ref 80–94)
MONOCYTES # BLD: 11.6 %
MONOCYTES ABSOLUTE: 0.9 THOU/MM3 (ref 0.4–1.3)
NUCLEATED RED BLOOD CELLS: 0 /100 WBC
OSMOLALITY CALCULATION: 282.9 MOSMOL/KG (ref 275–300)
PLATELET # BLD: 215 THOU/MM3 (ref 130–400)
PMV BLD AUTO: 10.5 FL (ref 9.4–12.4)
POTASSIUM SERPL-SCNC: 3.7 MEQ/L (ref 3.5–5.2)
RBC # BLD: 4.57 MILL/MM3 (ref 4.7–6.1)
SEG NEUTROPHILS: 61.4 %
SEGMENTED NEUTROPHILS ABSOLUTE COUNT: 4.9 THOU/MM3 (ref 1.8–7.7)
SODIUM BLD-SCNC: 140 MEQ/L (ref 135–145)
TOTAL PROTEIN: 7.1 G/DL (ref 6.1–8)
TROPONIN T: < 0.01 NG/ML
WBC # BLD: 8 THOU/MM3 (ref 4.8–10.8)

## 2022-07-03 PROCEDURE — 70496 CT ANGIOGRAPHY HEAD: CPT

## 2022-07-03 PROCEDURE — 80053 COMPREHEN METABOLIC PANEL: CPT

## 2022-07-03 PROCEDURE — 70498 CT ANGIOGRAPHY NECK: CPT

## 2022-07-03 PROCEDURE — 2580000003 HC RX 258: Performed by: EMERGENCY MEDICINE

## 2022-07-03 PROCEDURE — 6370000000 HC RX 637 (ALT 250 FOR IP): Performed by: EMERGENCY MEDICINE

## 2022-07-03 PROCEDURE — 85730 THROMBOPLASTIN TIME PARTIAL: CPT

## 2022-07-03 PROCEDURE — 84484 ASSAY OF TROPONIN QUANT: CPT

## 2022-07-03 PROCEDURE — 6360000004 HC RX CONTRAST MEDICATION: Performed by: EMERGENCY MEDICINE

## 2022-07-03 PROCEDURE — 85610 PROTHROMBIN TIME: CPT

## 2022-07-03 PROCEDURE — 93005 ELECTROCARDIOGRAM TRACING: CPT | Performed by: EMERGENCY MEDICINE

## 2022-07-03 PROCEDURE — 99285 EMERGENCY DEPT VISIT HI MDM: CPT

## 2022-07-03 PROCEDURE — 70450 CT HEAD/BRAIN W/O DYE: CPT

## 2022-07-03 PROCEDURE — 85025 COMPLETE CBC W/AUTO DIFF WBC: CPT

## 2022-07-03 RX ORDER — 0.9 % SODIUM CHLORIDE 0.9 %
1000 INTRAVENOUS SOLUTION INTRAVENOUS ONCE
Status: COMPLETED | OUTPATIENT
Start: 2022-07-03 | End: 2022-07-03

## 2022-07-03 RX ORDER — CLOPIDOGREL BISULFATE 75 MG/1
75 TABLET ORAL ONCE
Status: COMPLETED | OUTPATIENT
Start: 2022-07-03 | End: 2022-07-03

## 2022-07-03 RX ORDER — SIMVASTATIN 40 MG
40 TABLET ORAL NIGHTLY
Qty: 30 TABLET | Refills: 0 | Status: SHIPPED | OUTPATIENT
Start: 2022-07-03 | End: 2022-07-06 | Stop reason: ALTCHOICE

## 2022-07-03 RX ORDER — CLOPIDOGREL BISULFATE 75 MG/1
75 TABLET ORAL DAILY
Qty: 30 TABLET | Refills: 0 | Status: SHIPPED | OUTPATIENT
Start: 2022-07-03 | End: 2022-07-06 | Stop reason: ALTCHOICE

## 2022-07-03 RX ORDER — ASPIRIN 81 MG/1
324 TABLET, CHEWABLE ORAL ONCE
Status: COMPLETED | OUTPATIENT
Start: 2022-07-03 | End: 2022-07-03

## 2022-07-03 RX ADMIN — ASPIRIN 324 MG: 81 TABLET, CHEWABLE ORAL at 23:42

## 2022-07-03 RX ADMIN — IOPAMIDOL 80 ML: 755 INJECTION, SOLUTION INTRAVENOUS at 21:10

## 2022-07-03 RX ADMIN — SODIUM CHLORIDE 1000 ML: 9 INJECTION, SOLUTION INTRAVENOUS at 20:11

## 2022-07-03 RX ADMIN — CLOPIDOGREL BISULFATE 75 MG: 75 TABLET ORAL at 23:43

## 2022-07-03 ASSESSMENT — PAIN - FUNCTIONAL ASSESSMENT
PAIN_FUNCTIONAL_ASSESSMENT: NONE - DENIES PAIN

## 2022-07-03 NOTE — ED PROVIDER NOTES
[address] ENCOUNTER      PATIENT NAME: Carito Jackosn  MRN: [id number]  : 1950  TEE: 7/3/2022  PROVIDER: Nanette Farley MD      CHIEF COMPLAINT       Chief Complaint   Patient presents with   [facility]     right eye       Patient is seen and evaluated in a timely fashion. Nurses Notes are reviewed and I agree except as noted in the HPI. HISTORY OF PRESENT ILLNESS    Carito Jackson is a 74 y.o. male who presents to Emergency Department with Blurred Vision (right eye)     He suddenly experienced loss of right lateral vision when he was walking outside around 2 PM.  Symptoms lasted for several hours and resolved when he arrived in ED. Family also states patient had unsteady gait in the afternoon. Patient states he almost fell a few times due to unable seen well to right side  These are new symptoms. No history of CVA or TIA. He does not wear glasses. He is completely asymptomatic with right eye vision back to normal in ED. This HPI was provided by patient. REVIEW OF SYSTEMS   Ten-point review of systems is negative except those documented in above HPI including constitutional, HEENT, respiratory, cardiovascular, gastrointestinal, genitourinary, musculoskeletal, skin, neurological, hematological and behavioral.      PAST MEDICAL HISTORY    has a past medical history of Benign prostatic hyperplasia, Degenerative arthritis of lumbar spine, Diverticulosis, Hyperlipidemia, Hypertension, Insomnia, Obesity, Prediabetes, and Tinnitus. SURGICAL HISTORY      has a past surgical history that includes Cholecystectomy, laparoscopic (). CURRENT MEDICATIONS       Discharge Medication List as of 7/3/2022 11:27 PM      CONTINUE these medications which have NOT CHANGED    Details   clonazePAM (KLONOPIN) 0.5 MG tablet Take 1 tablet by mouth 2 times daily as needed for Anxiety for up to 14 days. , Disp-28 tablet, R-0Normal      busPIRone (BUSPAR) 7.5 MG tablet Take 1 tablet by mouth 2 times daily, Disp-60 tablet, R-2Normal      lisinopril-hydroCHLOROthiazide (ZESTORETIC) 10-12.5 MG per tablet Take 1 tablet by mouth daily, Disp-90 tablet, R-3Normal      amitriptyline (ELAVIL) 25 MG tablet TAKE 1 TO 2 TABLETS BY MOUTH NIGHTLY, Disp-60 tablet, R-3Normal      Ascorbic Acid (VITAMIN C) 250 MG tablet Take 250 mg by mouth dailyHistorical Med      vitamin D (CHOLECALCIFEROL) 1000 UNIT TABS tablet Take 1,000 Units by mouth daily. ALLERGIES     is allergic to penicillins. FAMILY HISTORY     He indicated that his mother is . family history includes Diabetes in his mother; Heart Attack in his mother. SOCIAL HISTORY      reports that he has never smoked. He has never used smokeless tobacco. He reports that he does not drink alcohol and does not use drugs. PHYSICAL EXAM      height is 6' (1.829 m) and weight is 200 lb (90.7 kg). His oral temperature is 97.8 °F (36.6 °C). His blood pressure is 139/84 and his pulse is 60. His respiration is 13 and oxygen saturation is 100%. Physical Exam  Vitals and nursing note reviewed. Constitutional:       Appearance: He is well-developed. He is not diaphoretic. HENT:      Head: Normocephalic and atraumatic. Nose: Nose normal.   Eyes:      General: No scleral icterus. Right eye: No discharge. Left eye: No discharge. Extraocular Movements: Extraocular movements intact. Conjunctiva/sclera: Conjunctivae normal.      Pupils: Pupils are equal, round, and reactive to light. Comments: Visual acuity at baseline to right eye. Brief funduscopic exam unremarkable. Neck:      Vascular: No JVD. Trachea: No tracheal deviation. Cardiovascular:      Rate and Rhythm: Normal rate and regular rhythm. Heart sounds: Normal heart sounds. No murmur heard. No friction rub. No gallop. Pulmonary:      Effort: Pulmonary effort is normal. No respiratory distress.       Breath sounds: Normal breath sounds. No stridor. No wheezing or rales. Chest:      Chest wall: No tenderness. Abdominal:      General: Bowel sounds are normal. There is no distension. Palpations: Abdomen is soft. There is no mass. Tenderness: There is no abdominal tenderness. There is no guarding or rebound. Hernia: No hernia is present. Musculoskeletal:         General: No tenderness or deformity. Cervical back: Normal range of motion and neck supple. Lymphadenopathy:      Cervical: No cervical adenopathy. Skin:     General: Skin is warm and dry. Capillary Refill: Capillary refill takes less than 2 seconds. Coloration: Skin is not pale. Findings: No erythema or rash. Neurological:      Mental Status: He is alert and oriented to person, place, and time. Cranial Nerves: No cranial nerve deficit. Sensory: No sensory deficit. Motor: No abnormal muscle tone. Coordination: Coordination normal.      Deep Tendon Reflexes: Reflexes normal.      Comments: NIHSS is 0   Psychiatric:         Behavior: Behavior normal.         Thought Content: Thought content normal.         Judgment: Judgment normal.       ANCILLARY TEST RESULTS   EKG:    Interpreted by me  Normal sinus rhythm  VR 85 bpm   ms  QRS 98 ms   ms  Normal ECG    LAB RESULTS:  Results for orders placed or performed during the hospital encounter of 07/03/22   CBC with Auto Differential   Result Value Ref Range    WBC 8.0 4.8 - 10.8 thou/mm3    RBC 4.57 (L) 4.70 - 6.10 mill/mm3    Hemoglobin 14.1 14.0 - 18.0 gm/dl    Hematocrit 43.5 42.0 - 52.0 %    MCV 95.2 (H) 80.0 - 94.0 fL    MCH 30.9 26.0 - 33.0 pg    MCHC 32.4 32.2 - 35.5 gm/dl    RDW-CV 13.7 11.5 - 14.5 %    RDW-SD 48.0 (H) 35.0 - 45.0 fL    Platelets 142 117 - 911 thou/mm3    MPV 10.5 9.4 - 12.4 fL    Seg Neutrophils 61.4 %    Lymphocytes 25.4 %    Monocytes 11.6 %    Eosinophils 0.6 %    Basophils 0.6 %    Immature Granulocytes 0.4 %    Segs Absolute 4.9 1.8 - 7.7 thou/mm3    Lymphocytes Absolute 2.0 1.0 - 4.8 thou/mm3    Monocytes Absolute 0.9 0.4 - 1.3 thou/mm3    Eosinophils Absolute 0.0 0.0 - 0.4 thou/mm3    Basophils Absolute 0.0 0.0 - 0.1 thou/mm3    Immature Grans (Abs) 0.03 0.00 - 0.07 thou/mm3    nRBC 0 /100 wbc   Comprehensive Metabolic Panel   Result Value Ref Range    Glucose 133 (H) 70 - 108 mg/dL    CREATININE 1.0 0.4 - 1.2 mg/dL    BUN 17 7 - 22 mg/dL    Sodium 140 135 - 145 meq/L    Potassium 3.7 3.5 - 5.2 meq/L    Chloride 103 98 - 111 meq/L    CO2 26 23 - 33 meq/L    Calcium 9.2 8.5 - 10.5 mg/dL    AST 15 5 - 40 U/L    Alkaline Phosphatase 67 38 - 126 U/L    Total Protein 7.1 6.1 - 8.0 g/dL    Albumin 4.1 3.5 - 5.1 g/dL    Total Bilirubin 0.5 0.3 - 1.2 mg/dL    ALT 13 11 - 66 U/L   Troponin   Result Value Ref Range    Troponin T < 0.010 ng/ml   APTT   Result Value Ref Range    aPTT 31.4 22.0 - 38.0 seconds   Protime-INR   Result Value Ref Range    INR 1.09 0.85 - 1.13   Anion Gap   Result Value Ref Range    Anion Gap 11.0 8.0 - 16.0 meq/L   Glomerular Filtration Rate, Estimated   Result Value Ref Range    Est, Glom Filt Rate 73 (A) ml/min/1.73m2   Osmolality   Result Value Ref Range    Osmolality Calc 282.9 275.0 - 300.0 mOsmol/kg   EKG Emergency   Result Value Ref Range    Ventricular Rate 85 BPM    Atrial Rate 85 BPM    P-R Interval 154 ms    QRS Duration 98 ms    Q-T Interval 382 ms    QTc Calculation (Bazett) 454 ms    P Axis 32 degrees    R Axis -24 degrees    T Axis 12 degrees       RADIOLOGY REPORTS  CT HEAD WO CONTRAST   Final Result   Impression:   1. No acute intracranial hemorrhage   2. Cerebral volume loss. This document has been electronically signed by: Damien Alfaro MD on    07/03/2022 10:07 PM      All CTs at this facility use dose modulation techniques and iterative    reconstructions, and/or weight-based dosing   when appropriate to reduce radiation to a low as reasonably achievable.       CTA HEAD W WO CONTRAST   Final Result   Impression:   1. CTA of the intracranial circulation demonstrates no large vessel    occlusion or stenosis. No aneurysm is identified. CTA neck:   Aortic arch branching is conventional.   CCA bilaterally are normal in caliber. The ICA bilaterally are patent without luminal narrowing. The vertebral arteries are patent throughout their cervical course,    without dissection. Multilevel degenerative disease of the cervical spine with large ventral    osteophytosis at C4, C5 and C6      Impression:   1. CTA of the neck demonstrates no occlusion or stenosis of the carotid or    vertebral arteries. No findings of dissection. This document has been electronically signed by: Almita Moraes MD on    07/03/2022 10:16 PM      All CTs at this facility use dose modulation techniques and iterative    reconstructions, and/or weight-based dosing   when appropriate to reduce radiation to a low as reasonably achievable. Carotid stenosis and measurements are in accordance with NASCET criteria. CTA NECK W WO CONTRAST   Final Result   Impression:   1. CTA of the intracranial circulation demonstrates no large vessel    occlusion or stenosis. No aneurysm is identified. CTA neck:   Aortic arch branching is conventional.   CCA bilaterally are normal in caliber. The ICA bilaterally are patent without luminal narrowing. The vertebral arteries are patent throughout their cervical course,    without dissection. Multilevel degenerative disease of the cervical spine with large ventral    osteophytosis at C4, C5 and C6      Impression:   1. CTA of the neck demonstrates no occlusion or stenosis of the carotid or    vertebral arteries. No findings of dissection.       This document has been electronically signed by: Almita Moraes MD on    07/03/2022 10:17 PM      All CTs at this facility use dose modulation techniques and iterative    reconstructions, and/or weight-based dosing when appropriate to reduce radiation to a low as reasonably achievable. Carotid stenosis and measurements are in accordance with NASCET criteria. MEDICAL DEDISION MAKING (MDM) AND ED COURSE   Patient is seen and evaluated at 7:18 PM EDT. DDx: TIA, CVA,dehydration. · Stable ED stay. Vital signs are stable, neurological exam are normal upon reassessment. · ECG has no acute changes, notably no atrial fibrillation. · Labs are reassuring. · CT head has no acute intracranial findings. · CTA head and neck are negative for hemodynamically stable stenosis. · Patient's complaint of right lateral hemianopsia can be the result of a TIA. · Discussed with interventional neurology on-call. Given patient he is currently asymptomatic, no indication for admission. However he needs to take aspirin, Plavix, and Zocor. He needs outpatient MRI which can be ordered by PCP. · Patient is discharged in stable conditions. Recurrent warning signs of CVA or TIA that warrant coming back to ED for reevaluation or thoroughly discussed before discharge. PCP follow-up in 3 days. Consult  Intervention neurology    Procedures  None    Medications   0.9 % sodium chloride bolus (0 mLs IntraVENous Stopped 7/3/22 2353)   iopamidol (ISOVUE-370) 76 % injection 80 mL (80 mLs IntraVENous Given 7/3/22 2110)   aspirin chewable tablet 324 mg (324 mg Oral Given 7/3/22 2342)   clopidogrel (PLAVIX) tablet 75 mg (75 mg Oral Given 7/3/22 2343)       Vitals:    07/03/22 1907 07/03/22 1954 07/03/22 2136 07/03/22 2324   BP: (!) 152/80 118/82 128/76 139/84   Pulse: 91 74 56 60   Resp: 18 18 12 13   Temp: 97.8 °F (36.6 °C)      TempSrc: Oral      SpO2: 97% 97% 100% 100%   Weight: 200 lb (90.7 kg)      Height: 6' (1.829 m)        FINAL IMPRESSION AND DISPOSITION      1. TIA (transient ischemic attack)    2.  Hemianopia of right eye        DISPOSITION Decision To Discharge 07/03/2022 11:43:47 PM        PATIENT REFERRED TO:  Jg Duong [address]  [address] [id number] [phone]    In 2 days  ED discharge follow up.  PCP to order MRI brain limited (no need to have contrast)    DISCHARGE MEDICATIONS:  Discharge Medication List as of 7/3/2022 11:27 PM      START taking these medications    Details   clopidogrel (PLAVIX) 75 MG tablet Take 1 tablet by mouth daily, Disp-30 tablet, R-0Normal      simvastatin (ZOCOR) 40 MG tablet Take 1 tablet by mouth nightly, Disp-30 tablet, R-0Normal           (Please note that portions of this note were completed with a voice recognition program.  Efforts were made to edit the dictations but occasionally words aremis-transcribed.)  MD Debbie Gonsalez MD  07/04/22 2157

## 2022-07-03 NOTE — ED NOTES
Vital signs collected. Pt denies any needs at this time. Pt respirations easy and unlabored.      Shawn Arvizu RN  07/03/22 [id number]

## 2022-07-03 NOTE — ED TRIAGE NOTES
Pt presents to the ED via triage with c/o blurred vision. pt states his right eye began to have change in vision around 1400 today. Pt states he is able to see out of his eye until he turns his head right. Pt states he has almost fallen a few times due to the blurred vision. Pt A & O x 4.  No abnormal gait noted upon walking back to room 8. RR easy and unlabored

## 2022-07-04 DIAGNOSIS — F51.01 PRIMARY INSOMNIA: Chronic | ICD-10-CM

## 2022-07-04 LAB
EKG ATRIAL RATE: 85 BPM
EKG P AXIS: 32 DEGREES
EKG P-R INTERVAL: 154 MS
EKG Q-T INTERVAL: 382 MS
EKG QRS DURATION: 98 MS
EKG QTC CALCULATION (BAZETT): 454 MS
EKG R AXIS: -24 DEGREES
EKG T AXIS: 12 DEGREES
EKG VENTRICULAR RATE: 85 BPM

## 2022-07-04 PROCEDURE — 93010 ELECTROCARDIOGRAM REPORT: CPT | Performed by: INTERNAL MEDICINE

## 2022-07-04 NOTE — ED NOTES
Vital signs collected. Pt denies any needs at this time. Pt respirations easy and unlabored.      Erika Reddy RN  07/03/22 [id number]

## 2022-07-04 NOTE — ED NOTES
Pt vital signs take. Pt notified that his next of kin and also his niece Lashaun Hidalgo called and asked about him. Pt denies any needs at this time. Pt respirations easy and unlabored.      Sylvia Kaplan RN  07/03/22 9239

## 2022-07-05 RX ORDER — AMITRIPTYLINE HYDROCHLORIDE 25 MG/1
TABLET, FILM COATED ORAL
Qty: 60 TABLET | Refills: 3 | Status: SHIPPED | OUTPATIENT
Start: 2022-07-05 | End: 2022-10-04

## 2022-07-05 NOTE — TELEPHONE ENCOUNTER
Recent Visits  Date Type Provider Dept   06/08/22 Office Visit Akash Garcia DO [facility]   11/15/21 Office Visit Akash Garcia DO [facility]   Showing recent visits within past 540 days with a meds authorizing provider and meeting all other requirements  Future Appointments  Date Type Provider Dept   07/07/22 Appointment Akash Garcia, [address] [place] [address]   Showing future appointments within next 150 days with a meds authorizing provider and meeting all other requirements    Future Appointments   Date Time Provider Shanell Juarez   7/7/2022  3:20 PM Akash Garcia, [address] [place] [address]

## 2022-07-06 ENCOUNTER — OFFICE VISIT (OUTPATIENT)
Dept: FAMILY MEDICINE CLINIC | Age: 72
End: 2022-07-06
Payer: MEDICARE

## 2022-07-06 VITALS
HEIGHT: 69 IN | DIASTOLIC BLOOD PRESSURE: 72 MMHG | WEIGHT: 206 LBS | BODY MASS INDEX: 30.51 KG/M2 | OXYGEN SATURATION: 98 % | RESPIRATION RATE: 12 BRPM | HEART RATE: 71 BPM | TEMPERATURE: 97.9 F | SYSTOLIC BLOOD PRESSURE: 122 MMHG

## 2022-07-06 DIAGNOSIS — F43.21 GRIEF: ICD-10-CM

## 2022-07-06 DIAGNOSIS — F43.0 ACUTE STRESS REACTION: ICD-10-CM

## 2022-07-06 DIAGNOSIS — F51.01 PRIMARY INSOMNIA: ICD-10-CM

## 2022-07-06 DIAGNOSIS — H53.8 BLURRY VISION, RIGHT EYE: Primary | ICD-10-CM

## 2022-07-06 DIAGNOSIS — F41.9 ANXIETY: ICD-10-CM

## 2022-07-06 DIAGNOSIS — E86.0 DEHYDRATION: ICD-10-CM

## 2022-07-06 PROCEDURE — 1123F ACP DISCUSS/DSCN MKR DOCD: CPT | Performed by: FAMILY MEDICINE

## 2022-07-06 PROCEDURE — 99214 OFFICE O/P EST MOD 30 MIN: CPT | Performed by: FAMILY MEDICINE

## 2022-07-06 ASSESSMENT — PATIENT HEALTH QUESTIONNAIRE - PHQ9
1. LITTLE INTEREST OR PLEASURE IN DOING THINGS: 0
SUM OF ALL RESPONSES TO PHQ9 QUESTIONS 1 & 2: 0
SUM OF ALL RESPONSES TO PHQ QUESTIONS 1-9: 0
2. FEELING DOWN, DEPRESSED OR HOPELESS: 0
SUM OF ALL RESPONSES TO PHQ QUESTIONS 1-9: 0

## 2022-07-06 NOTE — PROGRESS NOTES
Chief Complaint   Patient presents with    ED Follow-up     [facility]     Follow-up     Grief/Anxiety    Other     visual aquity  L20/25 Rt 20/20       History obtained from the patient. SUBJECTIVE:  Christy Fallon is a 74 y.o. male that presents today for     -ED f/u:  Seen in ER on 7/3  Was exercising in the heat and didn't drink water  Had a brief episode where when he looked to the R, his vision was blurry  Lasted a few min and resolved  Once got cool all sxs gone  No vision loss or amaurosis sxs  No HA  No syncope or near syncope  daugther recommended ER  In ER had labs that were neg  Had CT head that was neg  Had CTA head and neck that was neg  They started him on Plavix and zocor and told to f/u here to discuss MRI brain  Pt feels great today  No recurrence of sxs  No stroke like sxs  Discussed MRI brain  Pt doesn't want to do MRI or take meds from ER, feels he's fine      -Depression/Anxiety/Grief LAST VISIT:  Wife passed away a few wks ago  Rah Lunsford while I was out 2 wks ago  Given short course of klonopin  Pt feeling a little better  Still with anxiety  Denies sig depression  No SI/HI  Daughter in law is a pharmacist and recommended buspar, he'd like to add that  Elavil con't to help with his chronic insomnia    UPDATE TODAY:   On buspar from last visit and prn klonopin  Making progress  Happy with how meds are working.  Wants to con't w/o change  No SI/HI      Age/Gender Health Maintenance    Lipid - ; ; HDL 53;  (MAY 2014)    Lab Results   Component Value Date    CHOL 185 10/25/2021    CHOL 205 (H) 10/27/2020    CHOL 186 11/02/2019     Lab Results   Component Value Date    TRIG 134 10/25/2021    TRIG 87 10/27/2020    TRIG 97 11/02/2019     Lab Results   Component Value Date    HDL 49 10/25/2021    HDL 52 10/27/2020    HDL 52 11/02/2019     Lab Results   Component Value Date    LDLCALC 109 10/25/2021    LDLCALC 136 10/27/2020    LDLCALC 115 (H) 11/02/2019       DM Screen - 79 MAY 2014    Lab Results   Component Value Date/Time    GLUCOSE 133 07/03/2022 08:10 PM    GLUCOSE 97 11/02/2019 06:30 AM     Lab Results   Component Value Date/Time    LABA1C 6.0 02/10/2022 11:34 AM    LABA1C 7.0 10/25/2021 02:51 PM    LABA1C 5.8 05/10/2021 11:41 AM    LABA1C 6.3 10/27/2020 04:59 PM    LABA1C 5.9 11/03/2018 06:30 AM    LABA1C 6.0 11/05/2016 06:30 AM       Colon Cancer Screening - + multiple polyps SEPT 2018, repeat 5 years  Lung Cancer Screening (Age 54 to [de-identified] with 30 pack year hx, current smoker or quit within past 15 years) - never smoker    Tetanus - to get at pharmacy per medicare rules  Influenza Vaccine - UTD FALL 2021  Pneumonia Vaccine - UTD X2 both PPV 23 and PCV 13  Zostavax - UTD 2014  Shingrix - to get at pharmacy per medicare rules    PSA testing discussion - 1.15 MAY 2014  Lab Results   Component Value Date    PSA 1.32 11/02/2019    PSA 1.32 11/03/2018    PSA 1.19 05/05/2018       AAA Screening - never smoker. Current Outpatient Medications   Medication Sig Dispense Refill    amitriptyline (ELAVIL) 25 MG tablet TAKE 1 TO 2 TABLETS BY MOUTH NIGHTLY 60 tablet 3    busPIRone (BUSPAR) 7.5 MG tablet Take 1 tablet by mouth 2 times daily 60 tablet 2    lisinopril-hydroCHLOROthiazide (ZESTORETIC) 10-12.5 MG per tablet Take 1 tablet by mouth daily 90 tablet 3    Ascorbic Acid (VITAMIN C) 250 MG tablet Take 250 mg by mouth daily      vitamin D (CHOLECALCIFEROL) 1000 UNIT TABS tablet Take 1,000 Units by mouth daily.  clonazePAM (KLONOPIN) 0.5 MG tablet Take 1 tablet by mouth 2 times daily as needed for Anxiety for up to 14 days. 28 tablet 0     No current facility-administered medications for this visit. No orders of the defined types were placed in this encounter. All medications reviewed and reconciled, including OTC and herbal medications. Updated list given to patient.        Patient Active Problem List    Diagnosis Date Noted    Hypertension     Tinnitus      Of unknown cause      Obesity     Benign prostatic hyperplasia     Prediabetes     Degenerative arthritis of lumbar spine     Hyperlipidemia     Diverticulosis     Insomnia          Past Medical History:   Diagnosis Date    Benign prostatic hyperplasia     Degenerative arthritis of lumbar spine     Diverticulosis     Hyperlipidemia     Hypertension     Insomnia     Obesity     Prediabetes     Tinnitus     Of unknown cause         Past Surgical History:   Procedure Laterality Date    CHOLECYSTECTOMY, LAPAROSCOPIC  2005         Allergies   Allergen Reactions    Penicillins Rash       Social History     Tobacco Use    Smoking status: Never Smoker    Smokeless tobacco: Never Used   Substance Use Topics    Alcohol use: No       Family History   Problem Relation Age of Onset    Heart Attack Mother     Diabetes Mother          I have reviewed the patient's past medical history, past surgical history, allergies, medications, social and family history and I have made updates where appropriate.       Review of Systems  Positive responses are highlighted in bold    Constitutional:  Fever, Chills, Night Sweats, Fatigue, Unexpected changes in weight  HENT:  Ear pain, Tinnitus, Nosebleeds, Trouble swallowing, Hearing loss, Sore throat  Cardiovascular:  Chest Pain, Palpitations, Orthopnea, Paroxysmal Nocturnal Dyspnea  Respiratory:  Cough, Wheezing, Shortness of breath, Chest tightness, Apnea  Gastrointestinal:  Nausea, Vomiting, Diarrhea, Constipation, Heartburn, Blood in stool  Genitourinary:  Difficulty or painful urination, Flank pain, Change in frequency, Urgency  Skin:  Color change, Rash, Itching, Wound  Musculoskeletal:  Joint pain, Back pain, Gait problems, Joint swelling, Myalgias  Neurological:  Dizziness, Headaches, Presyncope, Numbness, Seizures, Tremors  Endocrine:  Heat Intolerance, Cold Intolerance, Polydipsia, Polyphagia, Polyuria      PHYSICAL EXAM:  Vitals:    07/06/22 0800   BP: 122/72 Pulse: 71   Resp: 12   Temp: 97.9 °F (36.6 °C)   TempSrc: Oral   SpO2: 98%   Weight: 206 lb (93.4 kg)   Height: 5' 9\" (1.753 m)     Body mass index is 30.42 kg/m². No results for this visit      VS Reviewed  General Appearance: A&O x 3, No acute distress,well developed and well- nourished  Eyes: pupils equal, round, and reactive to light, extraocular eye movements intact, conjunctivae and eye lids without erythema  ENT: external ear and ear canal clear bilaterally, TMs intact and regular, nose without deformity, nasal mucosa and turbinates normal without polyps, oropharynx normal, dentition is normal for age  Neck: supple and non-tender without mass, no thyromegaly or thyroid nodules, no cervical lymphadenopathy  Pulmonary/Chest: clear to auscultation bilaterally- no wheezes, rales or rhonchi, normal air movement, no respiratory distress or retractions  Cardiovascular: S1 and S2 auscultated w/ RRR. No murmurs, rubs, clicks, or gallops, distal pulses intact. Abdomen: soft, non-tender, non-distended, bowl sounds physiologic,  no rebound or guarding, no masses or hernias noted. Liver and spleen without enlargement. Extremities: no cyanosis, clubbing or edema of the lower extremities. Skin: warm and dry, no rash or erythema  Psych: Affect appropriate. Mood anxious. Thought process is normal without evidence of depression or psychosis. Good insight and appropriate interaction. Cognition and memory appear to be intact. Neuro Exam:   Cranial Khcodv-[id number] Intact.    Cranial nerve II: Normal Visual Acuity   Cranial nerve III: Pupils: equal, round, reactive to light   Cranial nerves III, IV, VI: Extraocular Movements: intact   Cranial nerve V: Facial sensation: intact   Cranial nerve VII:Facial strength: intact   Cranial nerve VIII: Hearing: intact   Cranial nerve IX: Palate Elevation intact bilaterally  Cranial nerve XI: Shoulder shrug intact bilaterally  Cranial nerve XII: Tongue movement: normal     Muscle 32.2 - 35.5 gm/dl Final    RDW-CV 07/03/2022 13.7  11.5 - 14.5 % Final    RDW-SD 07/03/2022 48.0* 35.0 - 45.0 fL Final    Platelets 34/60/1872 215  130 - 400 thou/mm3 Final    MPV 07/03/2022 10.5  9.4 - 12.4 fL Final    Seg Neutrophils 07/03/2022 61.4  % Final    Lymphocytes 07/03/2022 25.4  % Final    Monocytes 07/03/2022 11.6  % Final    Eosinophils 07/03/2022 0.6  % Final    Basophils 07/03/2022 0.6  % Final    Immature Granulocytes 07/03/2022 0.4  % Final    Segs Absolute 07/03/2022 4.9  1.8 - 7.7 thou/mm3 Final    Lymphocytes Absolute 07/03/2022 2.0  1.0 - 4.8 thou/mm3 Final    Monocytes Absolute 07/03/2022 0.9  0.4 - 1.3 thou/mm3 Final    Eosinophils Absolute 07/03/2022 0.0  0.0 - 0.4 thou/mm3 Final    Basophils Absolute 07/03/2022 0.0  0.0 - 0.1 thou/mm3 Final    Immature Grans (Abs) 07/03/2022 0.03  0.00 - 0.07 thou/mm3 Final    nRBC 07/03/2022 0  /100 wbc Final    Glucose 07/03/2022 133* 70 - 108 mg/dL Final    CREATININE 07/03/2022 1.0  0.4 - 1.2 mg/dL Final    BUN 07/03/2022 17  7 - 22 mg/dL Final    Sodium 07/03/2022 140  135 - 145 meq/L Final    Potassium 07/03/2022 3.7  3.5 - 5.2 meq/L Final    Chloride 07/03/2022 103  98 - 111 meq/L Final    CO2 07/03/2022 26  23 - 33 meq/L Final    Calcium 07/03/2022 9.2  8.5 - 10.5 mg/dL Final    AST 07/03/2022 15  5 - 40 U/L Final    Alkaline Phosphatase 07/03/2022 67  38 - 126 U/L Final    Total Protein 07/03/2022 7.1  6.1 - 8.0 g/dL Final    Albumin 07/03/2022 4.1  3.5 - 5.1 g/dL Final    Total Bilirubin 07/03/2022 0.5  0.3 - 1.2 mg/dL Final    ALT 07/03/2022 13  11 - 66 U/L Final    Troponin T 07/03/2022 < 0.010  ng/ml Final    aPTT 07/03/2022 31.4  22.0 - 38.0 seconds Final    INR 07/03/2022 1.09  0.85 - 1.13 Final    Ventricular Rate 07/03/2022 85  BPM Final    Atrial Rate 07/03/2022 85  BPM Final    P-R Interval 07/03/2022 154  ms Final    QRS Duration 07/03/2022 98  ms Final    Q-T Interval 07/03/2022 382  ms Final    appropriate to reduce radiation to a low as reasonably achievable.       Carotid stenosis and measurements are in accordance with NASCET criteria. Narrative   Exam: CT brain without contrast       Comparison: None       Clinical history: Left-sided hemianopsia. Concern for TIA, right eye    blurry, gait off-balance       Findings:   No acute intracranial hemorrhage identified. Enlargement of the ventricles and sulci secondary to volume loss. No abnormal extra-axial fluid collections are seen. No intracranial mass. No midline shift. No skull fracture.           Impression   Impression:   1. No acute intracranial hemorrhage   2. Cerebral volume loss.       This document has been electronically signed by: Arden Escobedo MD on    07/03/2022 10:07 PM       All CTs at this facility use dose modulation techniques and iterative    reconstructions, and/or weight-based dosing   when appropriate to reduce radiation to a low as reasonably achievable. ASSESSMENT & PLAN  1. Blurry vision, right eye    Hx not classic for TIA or Amaurosis   Labs, CT head, CTA head and neck all reassuring  Pt declines further testing and would like to stop Plavix and Zocor  Pt aware of risks of not pursing further w/u and starting meds  Pt is likely pretty low risk overall with neg CTA and CT and story more c/w dehydration that vascular  Will have him stop Plavix, zocor  Will forgo MRI for now  If sxs return, go to ER    2. Dehydration    Ensure good hydration when exercising    3. Acute stress reaction    Improving  con't buspar at 7.5mg bid and prn klonopin  Reassess 2 months, sooner any changes    4. Anxiety    As above    5. Grief    As above    6. Primary insomnia    As above      DISPOSITION    Return in about 2 months (around 9/6/2022) for f/u anxiety/grief, sooner as needed. Tonyshelton Khan released without restrictions.     Future Appointments   Date Time Provider Shanell Juarez   7/6/2022  8:20 AM Les Ng DO [facility] [address] [facility]   9/6/2022  1:20 PM [address] [place] [address] received counseling on the following healthy behaviors: nutrition, exercise and medication adherence    Barriers to learning and self management: none    Discussed use, benefit, and side effects of prescribed medications. Barriers to medication compliance addressed. All patient questions answered. Pt voiced understanding.        Electronically signed by Tova Shah DO on 7/6/2022 at 8:19 AM

## 2022-08-04 DIAGNOSIS — F43.0 ACUTE STRESS REACTION: Primary | ICD-10-CM

## 2022-08-04 DIAGNOSIS — F41.9 ANXIETY: ICD-10-CM

## 2022-08-04 DIAGNOSIS — F43.21 GRIEF: ICD-10-CM

## 2022-08-04 RX ORDER — CLONAZEPAM 0.5 MG/1
TABLET ORAL
Qty: 28 TABLET | Refills: 0 | Status: SHIPPED | OUTPATIENT
Start: 2022-08-04 | End: 2022-09-14

## 2022-09-01 DIAGNOSIS — F41.9 ANXIETY: ICD-10-CM

## 2022-09-01 DIAGNOSIS — F43.0 ACUTE STRESS REACTION: ICD-10-CM

## 2022-09-01 DIAGNOSIS — F43.21 GRIEF: ICD-10-CM

## 2022-09-01 RX ORDER — BUSPIRONE HYDROCHLORIDE 7.5 MG/1
TABLET ORAL
Qty: 60 TABLET | Refills: 3 | Status: SHIPPED | OUTPATIENT
Start: 2022-09-01

## 2022-09-01 NOTE — TELEPHONE ENCOUNTER
Recent Visits  Date Type Provider Dept   07/06/22 Office Visit Rosio Osman, DO [facility]   06/08/22 Office Visit Rosio Osman, DO [facility]   11/15/21 Office Visit Rosio Osman, DO [facility]   Showing recent visits within past 540 days with a meds authorizing provider and meeting all other requirements  Future Appointments  Date Type Provider Dept   09/14/22 Appointment Rosio Osman, [address] [place] [address]   Showing future appointments within next 150 days with a meds authorizing provider and meeting all other requirements  Future Appointments   Date Time Provider Shanell Juarez   9/14/2022  1:20 PM Rosio Osman, [address] [facility]

## 2022-09-13 NOTE — PROGRESS NOTES
Chief Complaint   Patient presents with    Follow-up     Chronic issues as noted below     History obtained from the patient. SUBJECTIVE:  Lora Saxena is a 74 y.o. male that presents today for     -ED f/u LAST VISIT:  Seen in ER on 7/3  Was exercising in the heat and didn't drink water  Had a brief episode where when he looked to the R, his vision was blurry  Lasted a few min and resolved  Once got cool all sxs gone  No vision loss or amaurosis sxs  No HA  No syncope or near syncope  daugther recommended ER  In ER had labs that were neg  Had CT head that was neg  Had CTA head and neck that was neg  They started him on Plavix and zocor and told to f/u here to discuss MRI brain  Pt feels great today  No recurrence of sxs  No stroke like sxs  Discussed MRI brain  Pt doesn't want to do MRI or take meds from ER, feels he's fine    UPDATE TODAY:   Doing well  No recurrence of above sxs since last visit  Declines further intervention      -Depression/Anxiety/Grief PRIOR VISIT:  Wife passed away a few wks ago  Peyton Hart while I was out 2 wks ago  Given short course of klonopin  Pt feeling a little better  Still with anxiety  Denies sig depression  No SI/HI  Daughter in law is a pharmacist and recommended buspar, he'd like to add that  Elavil con't to help with his chronic insomnia    UPDATE LAST VISIT:   On buspar from last visit and prn klonopin  Making progress  Happy with how meds are working.  Wants to con't w/o change  No SI/HI    UPDATE TODAY:   Doing ok  Moods stable  Buspar working well enough  Weaned off klonopin d/t some GI upset  No sI/HI       -Elevated a1c PRIOR VISIT  Noted on labs  Hx of preDM  No hx of DM  Diet worse the last 6 months  Has already started to make changes  Has lost some wt     UPDATE LAST VISIT:   Due for f/u labs  Have improved from prior  Wants to wait right now, does have order     UPDATE TODAY:   Due for labs  Had improved from prior  Working on [employer] Component Value Date/Time    LABA1C 6.0 02/10/2022 11:34 AM    LABA1C 7.0 10/25/2021 02:51 PM    LABA1C 5.8 05/10/2021 11:41 AM    LABA1C 6.3 10/27/2020 04:59 PM    LABA1C 5.9 11/03/2018 06:30 AM    LABA1C 6.0 11/05/2016 06:30 AM        -Tinnitus PRIOR VISIT: chronic issue, bilateral. Had for 20 years. No cause. No cure. Used to be pretty bad, but had been ok until 4 months ago. Started to become severe again. Hard to deal with. Denies SI/HI. Has some hearing loss, not new. Has hearing aides, don't help with tinnitus. Used to be on xanax for this, off 10 years, asking to resume to help deal with tinnitus. No vertigo. No headaches. No head trauma. No falls. No changes that he can think of. Hard to sleep and fxn. [facility] at [facility] 10-20 years ago. Dr. Mary Eugene had on xanax as above. No sI/HI. Mild anxiety. UPDATE TODAY:   Doing better  Coping well  Not needed BZD any more for this indication  No dizziness  No HA's        -HTN:     HPI:     Taking meds as prescribed ?: yes  Tolerating well ?: yes  Side Effects ?: denies  BP at home ?: <140/90  Working on [employer] ?: yes  Chest Pain/SOB/Palpitations? denies        -HLD:     HPI: due for labs, labs had improved with diet and exercise. High cohort score yet. Was trialled on crestor previously. However, no longer taking.       Working on [employer] ?: yes      Age/Gender Health Maintenance    Lipid - ; ; HDL 53;  (MAY 2014)    Lab Results   Component Value Date    CHOL 185 10/25/2021    CHOL 205 (H) 10/27/2020    CHOL 186 11/02/2019     Lab Results   Component Value Date    TRIG 134 10/25/2021    TRIG 87 10/27/2020    TRIG 97 11/02/2019     Lab Results   Component Value Date    HDL 49 10/25/2021    HDL 52 10/27/2020    HDL 52 11/02/2019     Lab Results   Component Value Date    LDLCALC 109 10/25/2021    LDLCALC 136 10/27/2020    LDLCALC 115 (H) 11/02/2019       DM Screen - 79 MAY 2014    Lab Results   Component Value Date/Time    GLUCOSE 133 07/03/2022 08:10 PM    GLUCOSE 97 11/02/2019 06:30 AM     Lab Results   Component Value Date/Time    LABA1C 6.0 02/10/2022 11:34 AM    LABA1C 7.0 10/25/2021 02:51 PM    LABA1C 5.8 05/10/2021 11:41 AM    LABA1C 6.3 10/27/2020 04:59 PM    LABA1C 5.9 11/03/2018 06:30 AM    LABA1C 6.0 11/05/2016 06:30 AM       Colon Cancer Screening - + multiple polyps SEPT 2018, repeat 5 years  Lung Cancer Screening - never smoker    Tetanus - to get at pharmacy per medicare rules  Influenza Vaccine - UTD FALL 2021/wants to wait FALL 2022  Pneumonia Vaccine - UTD X2 both PPV 23 and PCV 13  Zostavax - UTD 2014  Shingrix - to get at pharmacy per medicare rules    PSA testing discussion - 1.15 MAY 2014  Lab Results   Component Value Date    PSA 1.32 11/02/2019    PSA 1.32 11/03/2018    PSA 1.19 05/05/2018       AAA Screening - never smoker. Current Outpatient Medications   Medication Sig Dispense Refill    busPIRone (BUSPAR) 7.5 MG tablet Take 1 tablet by mouth twice daily 60 tablet 3    amitriptyline (ELAVIL) 25 MG tablet TAKE 1 TO 2 TABLETS BY MOUTH NIGHTLY 60 tablet 3    lisinopril-hydroCHLOROthiazide (ZESTORETIC) 10-12.5 MG per tablet Take 1 tablet by mouth daily 90 tablet 3    Ascorbic Acid (VITAMIN C) 250 MG tablet Take 250 mg by mouth daily      vitamin D (CHOLECALCIFEROL) 1000 UNIT TABS tablet Take 1,000 Units by mouth daily. No current facility-administered medications for this visit. No orders of the defined types were placed in this encounter. All medications reviewed and reconciled, including OTC and herbal medications. Updated list given to patient.        Patient Active Problem List    Diagnosis Date Noted    Hypertension     Tinnitus      Of unknown cause      Obesity     Benign prostatic hyperplasia     Prediabetes     Degenerative arthritis of lumbar spine     Hyperlipidemia     Diverticulosis     Insomnia        Past Medical History:   Diagnosis Date    Benign prostatic hyperplasia     Degenerative arthritis of lumbar spine     Diverticulosis     Hyperlipidemia     Hypertension     Insomnia     Obesity     Prediabetes     Tinnitus     Of unknown cause       Past Surgical History:   Procedure Laterality Date    CHOLECYSTECTOMY, LAPAROSCOPIC  2005       Allergies   Allergen Reactions    Penicillins Rash       Social History     Tobacco Use    Smoking status: Never    Smokeless tobacco: Never   Substance Use Topics    Alcohol use: No       Family History   Problem Relation Age of Onset    Heart Attack Mother     Diabetes Mother          I have reviewed the patient's past medical history, past surgical history, allergies, medications, social and family history and I have made updates where appropriate. Review of Systems  Positive responses are highlighted in bold    Constitutional:  Fever, Chills, Night Sweats, Fatigue, Unexpected changes in weight  HENT:  Ear pain, Tinnitus, Nosebleeds, Trouble swallowing, Hearing loss, Sore throat  Cardiovascular:  Chest Pain, Palpitations, Orthopnea, Paroxysmal Nocturnal Dyspnea  Respiratory:  Cough, Wheezing, Shortness of breath, Chest tightness, Apnea  Gastrointestinal:  Nausea, Vomiting, Diarrhea, Constipation, Heartburn, Blood in stool  Genitourinary:  Difficulty or painful urination, Flank pain, Change in frequency, Urgency  Skin:  Color change, Rash, Itching, Wound  Musculoskeletal:  Joint pain, Back pain, Gait problems, Joint swelling, Myalgias  Neurological:  Dizziness, Headaches, Presyncope, Numbness, Seizures, Tremors  Endocrine:  Heat Intolerance, Cold Intolerance, Polydipsia, Polyphagia, Polyuria      PHYSICAL EXAM:  Vitals:    09/14/22 1319   BP: 118/72   Pulse: 67   Resp: 16   Temp: 97.7 °F (36.5 °C)   TempSrc: Oral   SpO2: 97%   Weight: 213 lb 9.6 oz (96.9 kg)   Height: 6' (1.829 m)     Body mass index is 28.97 kg/m².        VS Reviewed  General Appearance: A&O x 3, No acute distress,well developed and well- nourished  Eyes: pupils equal, round, and reactive to light, extraocular eye movements intact, conjunctivae and eye lids without erythema  ENT: external ear and ear canal clear bilaterally, TMs intact and regular, nose without deformity, nasal mucosa and turbinates normal without polyps, oropharynx normal, dentition is normal for age  Neck: supple and non-tender without mass, no thyromegaly or thyroid nodules, no cervical lymphadenopathy  Pulmonary/Chest: clear to auscultation bilaterally- no wheezes, rales or rhonchi, normal air movement, no respiratory distress or retractions  Cardiovascular: S1 and S2 auscultated w/ RRR. No murmurs, rubs, clicks, or gallops, distal pulses intact. Abdomen: soft, non-tender, non-distended, bowl sounds physiologic,  no rebound or guarding, no masses or hernias noted. Liver and spleen without enlargement. Extremities: no cyanosis, clubbing or edema of the lower extremities. Skin: warm and dry, no rash or erythema  Psych: Affect appropriate. Mood anxious. Thought process is normal without evidence of depression or psychosis. Good insight and appropriate interaction. Cognition and memory appear to be intact. No visits with results within 1 Week(s) from this visit.    Latest known visit with results is:   Admission on 07/03/2022, Discharged on 07/03/2022   Component Date Value Ref Range Status    WBC 07/03/2022 8.0  4.8 - 10.8 thou/mm3 Final    RBC 07/03/2022 4.57 (A) 4.70 - 6.10 mill/mm3 Final    Hemoglobin 07/03/2022 14.1  14.0 - 18.0 gm/dl Final    Hematocrit 07/03/2022 43.5  42.0 - 52.0 % Final    MCV 07/03/2022 95.2 (A) 80.0 - 94.0 fL Final    MCH 07/03/2022 30.9  26.0 - 33.0 pg Final    MCHC 07/03/2022 32.4  32.2 - 35.5 gm/dl Final    RDW-CV 07/03/2022 13.7  11.5 - 14.5 % Final    RDW-SD 07/03/2022 48.0 (A) 35.0 - 45.0 fL Final    Platelets 17/91/5896 215  130 - 400 thou/mm3 Final    MPV 07/03/2022 10.5  9.4 - 12.4 fL Final    Seg Neutrophils 07/03/2022 61.4  % Final    Lymphocytes 07/03/2022 25.4  % Final    Monocytes 07/03/2022 11.6  % Final    Eosinophils 07/03/2022 0.6  % Final    Basophils 07/03/2022 0.6  % Final    Immature Granulocytes 07/03/2022 0.4  % Final    Segs Absolute 07/03/2022 4.9  1.8 - 7.7 thou/mm3 Final    Lymphocytes Absolute 07/03/2022 2.0  1.0 - 4.8 thou/mm3 Final    Monocytes Absolute 07/03/2022 0.9  0.4 - 1.3 thou/mm3 Final    Eosinophils Absolute 07/03/2022 0.0  0.0 - 0.4 thou/mm3 Final    Basophils Absolute 07/03/2022 0.0  0.0 - 0.1 thou/mm3 Final    Immature Grans (Abs) 07/03/2022 0.03  0.00 - 0.07 thou/mm3 Final    nRBC 07/03/2022 0  /100 wbc Final    Glucose 07/03/2022 133 (A) 70 - 108 mg/dL Final    Creatinine 07/03/2022 1.0  0.4 - 1.2 mg/dL Final    BUN 07/03/2022 17  7 - 22 mg/dL Final    Sodium 07/03/2022 140  135 - 145 meq/L Final    Potassium 07/03/2022 3.7  3.5 - 5.2 meq/L Final    Chloride 07/03/2022 103  98 - 111 meq/L Final    CO2 07/03/2022 26  23 - 33 meq/L Final    Calcium 07/03/2022 9.2  8.5 - 10.5 mg/dL Final    AST 07/03/2022 15  5 - 40 U/L Final    Alkaline Phosphatase 07/03/2022 67  38 - 126 U/L Final    Total Protein 07/03/2022 7.1  6.1 - 8.0 g/dL Final    Albumin 07/03/2022 4.1  3.5 - 5.1 g/dL Final    Total Bilirubin 07/03/2022 0.5  0.3 - 1.2 mg/dL Final    ALT 07/03/2022 13  11 - 66 U/L Final    Troponin T 07/03/2022 < 0.010  ng/ml Final    aPTT 07/03/2022 31.4  22.0 - 38.0 seconds Final    INR 07/03/2022 1.09  0.85 - 1.13 Final    Ventricular Rate 07/03/2022 85  BPM Final    Atrial Rate 07/03/2022 85  BPM Final    P-R Interval 07/03/2022 154  ms Final    QRS Duration 07/03/2022 98  ms Final    Q-T Interval 07/03/2022 382  ms Final    QTc Calculation (Bazett) 07/03/2022 454  ms Final    P Axis 07/03/2022 32  degrees Final    R Axis 07/03/2022 -24  degrees Final    T Axis 07/03/2022 12  degrees Final    Anion Gap 07/03/2022 11.0  8.0 - 16.0 meq/L Final    Est, Glom Filt Rate 07/03/2022 73 (A) ml/min/1.73m2 Final    Osmolality Calc 07/03/2022 282.9  275.0 - 300.0 mOsmol/kg Final hypercholesterolemia    Stable  con't diet control  Labs appropriate      DISPOSITION    Return in about 6 months (around 3/14/2023) for AWV, follow-up on chronic medical conditions, sooner as needed. Trevor Smallwood released without restrictions. Future Appointments   Date Time Provider Shanell Juarez   3/15/2023  1:20 PM Mindy Arshad DO [facility] [address] received counseling on the following healthy behaviors: nutrition, exercise and medication adherence    Barriers to learning and self management: none    Discussed use, benefit, and side effects of prescribed medications. Barriers to medication compliance addressed. All patient questions answered. Pt voiced understanding.        Electronically signed by Mindy Arshad DO on 9/14/2022 at 1:42 PM

## 2022-09-13 NOTE — PATIENT INSTRUCTIONS
LAB INSTRUCTIONS:    Please complete labs within 4 week(s). Please fast for 8 hours prior to lab collection. The clinic will call you within 1 week of collection. If you have not heard from us within that amount of time, please call us at [phone].

## 2022-09-14 ENCOUNTER — OFFICE VISIT (OUTPATIENT)
Dept: FAMILY MEDICINE CLINIC | Age: 72
End: 2022-09-14
Payer: MEDICARE

## 2022-09-14 VITALS
HEART RATE: 67 BPM | TEMPERATURE: 97.7 F | DIASTOLIC BLOOD PRESSURE: 72 MMHG | HEIGHT: 72 IN | SYSTOLIC BLOOD PRESSURE: 118 MMHG | BODY MASS INDEX: 28.93 KG/M2 | WEIGHT: 213.6 LBS | RESPIRATION RATE: 16 BRPM | OXYGEN SATURATION: 97 %

## 2022-09-14 DIAGNOSIS — I10 ESSENTIAL HYPERTENSION: ICD-10-CM

## 2022-09-14 DIAGNOSIS — F43.21 GRIEF: ICD-10-CM

## 2022-09-14 DIAGNOSIS — F51.01 PRIMARY INSOMNIA: ICD-10-CM

## 2022-09-14 DIAGNOSIS — F41.9 ANXIETY: ICD-10-CM

## 2022-09-14 DIAGNOSIS — F43.0 ACUTE STRESS REACTION: ICD-10-CM

## 2022-09-14 DIAGNOSIS — E78.00 PURE HYPERCHOLESTEROLEMIA: ICD-10-CM

## 2022-09-14 DIAGNOSIS — H53.8 BLURRY VISION, RIGHT EYE: Primary | ICD-10-CM

## 2022-09-14 DIAGNOSIS — R73.9 HYPERGLYCEMIA: ICD-10-CM

## 2022-09-14 DIAGNOSIS — R73.03 PREDIABETES: ICD-10-CM

## 2022-09-14 DIAGNOSIS — H93.13 TINNITUS OF BOTH EARS: ICD-10-CM

## 2022-09-14 DIAGNOSIS — E86.0 DEHYDRATION: ICD-10-CM

## 2022-09-14 PROCEDURE — 1123F ACP DISCUSS/DSCN MKR DOCD: CPT | Performed by: FAMILY MEDICINE

## 2022-09-14 PROCEDURE — 99214 OFFICE O/P EST MOD 30 MIN: CPT | Performed by: FAMILY MEDICINE

## 2022-09-26 ENCOUNTER — NURSE ONLY (OUTPATIENT)
Dept: LAB | Age: 72
End: 2022-09-26

## 2022-09-26 DIAGNOSIS — I10 ESSENTIAL HYPERTENSION: ICD-10-CM

## 2022-09-26 DIAGNOSIS — R73.03 PREDIABETES: ICD-10-CM

## 2022-09-26 DIAGNOSIS — R73.9 HYPERGLYCEMIA: ICD-10-CM

## 2022-09-26 DIAGNOSIS — D53.9 MACROCYTIC ANEMIA: Primary | ICD-10-CM

## 2022-09-26 LAB
ALBUMIN SERPL-MCNC: 3.9 G/DL (ref 3.5–5.1)
ALP BLD-CCNC: 70 U/L (ref 38–126)
ALT SERPL-CCNC: 13 U/L (ref 11–66)
ANION GAP SERPL CALCULATED.3IONS-SCNC: 8 MEQ/L (ref 8–16)
AST SERPL-CCNC: 16 U/L (ref 5–40)
AVERAGE GLUCOSE: 117 MG/DL (ref 70–126)
BASOPHILS # BLD: 1.3 %
BASOPHILS ABSOLUTE: 0.1 THOU/MM3 (ref 0–0.1)
BILIRUB SERPL-MCNC: 0.8 MG/DL (ref 0.3–1.2)
BUN BLDV-MCNC: 24 MG/DL (ref 7–22)
CALCIUM SERPL-MCNC: 9 MG/DL (ref 8.5–10.5)
CHLORIDE BLD-SCNC: 104 MEQ/L (ref 98–111)
CHOLESTEROL, TOTAL: 188 MG/DL (ref 100–199)
CO2: 27 MEQ/L (ref 23–33)
CREAT SERPL-MCNC: 0.9 MG/DL (ref 0.4–1.2)
CREATININE, URINE: 203 MG/DL
EOSINOPHIL # BLD: 2.5 %
EOSINOPHILS ABSOLUTE: 0.2 THOU/MM3 (ref 0–0.4)
ERYTHROCYTE [DISTWIDTH] IN BLOOD BY AUTOMATED COUNT: 14 % (ref 11.5–14.5)
ERYTHROCYTE [DISTWIDTH] IN BLOOD BY AUTOMATED COUNT: 49.5 FL (ref 35–45)
GFR SERPL CREATININE-BSD FRML MDRD: 82 ML/MIN/1.73M2
GLUCOSE BLD-MCNC: 93 MG/DL (ref 70–108)
HBA1C MFR BLD: 5.9 % (ref 4.4–6.4)
HCT VFR BLD CALC: 42.5 % (ref 42–52)
HDLC SERPL-MCNC: 58 MG/DL
HEMOGLOBIN: 13.4 GM/DL (ref 14–18)
IMMATURE GRANS (ABS): 0.02 THOU/MM3 (ref 0–0.07)
IMMATURE GRANULOCYTES: 0.3 %
LDL CHOLESTEROL CALCULATED: 116 MG/DL
LYMPHOCYTES # BLD: 42.8 %
LYMPHOCYTES ABSOLUTE: 3.1 THOU/MM3 (ref 1–4.8)
MCH RBC QN AUTO: 30.5 PG (ref 26–33)
MCHC RBC AUTO-ENTMCNC: 31.5 GM/DL (ref 32.2–35.5)
MCV RBC AUTO: 96.8 FL (ref 80–94)
MICROALBUMIN UR-MCNC: < 1.2 MG/DL
MICROALBUMIN/CREAT UR-RTO: 6 MG/G (ref 0–30)
MONOCYTES # BLD: 12.7 %
MONOCYTES ABSOLUTE: 0.9 THOU/MM3 (ref 0.4–1.3)
NUCLEATED RED BLOOD CELLS: 0 /100 WBC
PLATELET # BLD: 189 THOU/MM3 (ref 130–400)
PMV BLD AUTO: 11.1 FL (ref 9.4–12.4)
POTASSIUM SERPL-SCNC: 4.2 MEQ/L (ref 3.5–5.2)
RBC # BLD: 4.39 MILL/MM3 (ref 4.7–6.1)
SEG NEUTROPHILS: 40.4 %
SEGMENTED NEUTROPHILS ABSOLUTE COUNT: 2.9 THOU/MM3 (ref 1.8–7.7)
SODIUM BLD-SCNC: 139 MEQ/L (ref 135–145)
TOTAL PROTEIN: 6.8 G/DL (ref 6.1–8)
TRIGL SERPL-MCNC: 70 MG/DL (ref 0–199)
TSH SERPL DL<=0.05 MIU/L-ACNC: 0.93 UIU/ML (ref 0.4–4.2)
WBC # BLD: 7.2 THOU/MM3 (ref 4.8–10.8)

## 2022-09-27 ENCOUNTER — TELEPHONE (OUTPATIENT)
Dept: FAMILY MEDICINE CLINIC | Age: 72
End: 2022-09-27

## 2022-09-27 NOTE — TELEPHONE ENCOUNTER
Pt informed and verbalized understanding. <<-----Click on this checkbox to enter Procedure Cataract extract, extracaps, phacoemuls, w/ prosth lens insertn, 1 stage  01/14/2019    Active  YASMIN

## 2022-09-27 NOTE — TELEPHONE ENCOUNTER
----- Message from Sade Cleary, DO sent at 9/26/2022  8:04 PM EDT -----  Please let pt know that labs look good other than cbc suggests that his b12 or folate levels may be low  I've ordered f/u labs to look at this further, fasting  Let me know if questions, thanks!

## 2022-09-28 ENCOUNTER — TELEPHONE (OUTPATIENT)
Dept: FAMILY MEDICINE CLINIC | Age: 72
End: 2022-09-28

## 2022-09-28 NOTE — TELEPHONE ENCOUNTER
----- Message from Emy Lama sent at 9/28/2022  9:39 AM EDT -----  Subject: Results Request    QUESTIONS  Results: Labs; Ordered by: Marques Guillaume   Date Performed: 2022-09-26  ---------------------------------------------------------------------------  --------------  Earnestine Molina GYWX    [phone]; OK to leave message on voicemail  ---------------------------------------------------------------------------  -------------- Health Maintenance Summary     Topic Due On Due Status Completed On Postpone Until Reason    MAMMOGRAM - BREAST CANCER SCREENING Dec 14, 2018 Not Due Dec 14, 2016      Osteoporosis Screening  Completed Sep 19, 2017      Colorectal Cancer Screening - Colonoscopy Feb 2, 2022 Not Due Feb 2, 2017      Immunization - TDAP Pregnancy  Hidden       Medicare Wellness Visit Aug 25, 2018 Due Soon Aug 25, 2017      IMMUNIZATION - DTaP/Tdap/Td Oct 28, 2025 Not Due Oct 28, 2015      Immunization-Influenza Sep 1, 2018 Not Due       Depression Screening Feb 23, 2019 Not Due Feb 23, 2018      Hepatitis C Screening Jul 29, 2002 Overdue        Jul 29, 2016 Postponed  Aug 31, 2018 Patient Refused          Patient is due for topics as listed above, she wishes to discuss with provider .

## 2022-10-03 ENCOUNTER — NURSE ONLY (OUTPATIENT)
Dept: LAB | Age: 72
End: 2022-10-03

## 2022-10-03 DIAGNOSIS — D53.9 MACROCYTIC ANEMIA: ICD-10-CM

## 2022-10-03 LAB
ABSOLUTE RETIC #: 75 THOU/MM3 (ref 20–115)
BASOPHILS # BLD: 1.2 %
BASOPHILS ABSOLUTE: 0.1 THOU/MM3 (ref 0–0.1)
EOSINOPHIL # BLD: 3 %
EOSINOPHILS ABSOLUTE: 0.2 THOU/MM3 (ref 0–0.4)
ERYTHROCYTE [DISTWIDTH] IN BLOOD BY AUTOMATED COUNT: 14.1 % (ref 11.5–14.5)
ERYTHROCYTE [DISTWIDTH] IN BLOOD BY AUTOMATED COUNT: 48.8 FL (ref 35–45)
FOLATE: > 20 NG/ML (ref 4.8–24.2)
HCT VFR BLD CALC: 43.7 % (ref 42–52)
HEMOGLOBIN: 14.4 GM/DL (ref 14–18)
IMMATURE GRANS (ABS): 0.02 THOU/MM3 (ref 0–0.07)
IMMATURE GRANULOCYTES: 0.3 %
IMMATURE RETIC FRACT: 14.5 % (ref 2.3–13.4)
LYMPHOCYTES # BLD: 39.5 %
LYMPHOCYTES ABSOLUTE: 3 THOU/MM3 (ref 1–4.8)
MCH RBC QN AUTO: 31.3 PG (ref 26–33)
MCHC RBC AUTO-ENTMCNC: 33 GM/DL (ref 32.2–35.5)
MCV RBC AUTO: 95 FL (ref 80–94)
MONOCYTES # BLD: 14 %
MONOCYTES ABSOLUTE: 1.1 THOU/MM3 (ref 0.4–1.3)
NUCLEATED RED BLOOD CELLS: 0 /100 WBC
PLATELET # BLD: 202 THOU/MM3 (ref 130–400)
PMV BLD AUTO: 11 FL (ref 9.4–12.4)
RBC # BLD: 4.6 MILL/MM3 (ref 4.7–6.1)
RETIC HEMOGLOBIN: 34.6 PG (ref 28.2–35.7)
RETICULOCYTE ABSOLUTE COUNT: 1.6 % (ref 0.5–2)
SEG NEUTROPHILS: 42 %
SEGMENTED NEUTROPHILS ABSOLUTE COUNT: 3.2 THOU/MM3 (ref 1.8–7.7)
VITAMIN B-12: 345 PG/ML (ref 211–911)
WBC # BLD: 7.7 THOU/MM3 (ref 4.8–10.8)

## 2022-10-04 ENCOUNTER — TELEPHONE (OUTPATIENT)
Dept: FAMILY MEDICINE CLINIC | Age: 72
End: 2022-10-04

## 2022-10-04 DIAGNOSIS — F51.01 PRIMARY INSOMNIA: Chronic | ICD-10-CM

## 2022-10-04 RX ORDER — AMITRIPTYLINE HYDROCHLORIDE 25 MG/1
TABLET, FILM COATED ORAL
Qty: 60 TABLET | Refills: 3 | Status: SHIPPED | OUTPATIENT
Start: 2022-10-04

## 2022-10-04 NOTE — TELEPHONE ENCOUNTER
Recent Visits  Date Type Provider Dept   09/14/22 Office Visit Fabio Hankins, DO [facility]   07/06/22 Office Visit Fabio Hankins, DO [facility]   06/08/22 Office Visit Fabio Hankins, DO [facility]   05/27/22 Office Visit Tiera Stewart, APRN - CNP [facility]   11/15/21 Office Visit Fabio Hankins, DO [facility]   Showing recent visits within past 540 days with a meds authorizing provider and meeting all other requirements  Future Appointments  No visits were found meeting these conditions.   Showing future appointments within next 150 days with a meds authorizing provider and meeting all other requirements     Future Appointments   Date Time Provider Shanell Juarez   3/15/2023  1:20 PM Fabio Hankins, [address] [place] [address]

## 2022-10-04 NOTE — TELEPHONE ENCOUNTER
----- Message from Zoe Guzman, DO sent at 10/3/2022  6:08 PM EDT -----  Please let pt know that f/u labs look good  B12, folate and CBC are appropriate  No f/u needed on this  Let me know if questions, thanks!

## 2022-11-25 ENCOUNTER — TELEPHONE (OUTPATIENT)
Dept: PHARMACY | Facility: CLINIC | Age: 72
End: 2022-11-25

## 2022-11-25 NOTE — TELEPHONE ENCOUNTER
[facility]: ADHERENCE REVIEW  Identified care gap per [place]: fills at [facility]: ACE/ARB adherence    Last Visit: 9/14/22    ASSESSMENT  ACE/ARB ADHERENCE    Insurance Records claims through 11.21.22 YTD [place] =  85%; Potential Fail Date: 11/29/22 ):   LISINOP/HCTZ TAB 10-12.5mg. Next refill due: 10/2/22    Per    Portal:   last filled on 10/6/22 for 90 day supply then REVERSED. Last picked up in July    Per [facility]:   Staff will refill    BP Readings from Last 3 Encounters:   09/14/22 118/72   07/06/22 122/72   07/03/22 139/84     Estimated Creatinine Clearance: 87 mL/min (based on SCr of 0.9 mg/dL). PLAN  The following are interventions that have been identified:   - Patient overdue refilling LISINOP/HCTZ TAB 10-12.5mg. and active on home medication list.     Attempting to reach patient to review. Left message asking for return call. Will need to  refill    Future Appointments   Date Time Provider Shanell Juarez   3/15/2023  1:20 PM Clint Lorenzana DO [facility] [address].    [facility] free: [address] [facility] in place:  No  Recommendation Provided To: Pharmacy: 1  Intervention Detail: Refill(s) Provided  Gap Closed?: No   Intervention Accepted By: Pharmacy: 1  Time Spent (min): 15

## 2023-02-06 DIAGNOSIS — F43.21 GRIEF: ICD-10-CM

## 2023-02-06 DIAGNOSIS — F43.0 ACUTE STRESS REACTION: ICD-10-CM

## 2023-02-06 DIAGNOSIS — F41.9 ANXIETY: ICD-10-CM

## 2023-02-07 RX ORDER — BUSPIRONE HYDROCHLORIDE 7.5 MG/1
TABLET ORAL
Qty: 180 TABLET | Refills: 0 | Status: SHIPPED | OUTPATIENT
Start: 2023-02-07

## 2023-02-07 NOTE — TELEPHONE ENCOUNTER
Recent Visits  Date Type Provider Dept   09/14/22 Office Visit Camila Booth, DO [facility]   07/06/22 Office Visit Wilanibal Booth, DO [facility]   06/08/22 Office Visit Wilhemevelyn Daleyw, DO [facility]   05/27/22 Office Visit STEPHANIE Vyas - CNP [facility]  [address]   11/15/21 Office Visit Camila Booth, DO [facility]   Showing recent visits within past 540 days with a meds authorizing provider and meeting all other requirements  Future Appointments  Date Type Provider Dept   03/15/23 Appointment Camila Booth, DO [facility]   Showing future appointments within next 150 days with a meds authorizing provider and meeting all other requirements      Future Appointments   Date Time Provider Shanell Juarez   3/15/2023  1:20 PM Camila Booth, [address] [place] [address]

## 2023-02-25 DIAGNOSIS — F51.01 PRIMARY INSOMNIA: Chronic | ICD-10-CM

## 2023-02-27 RX ORDER — AMITRIPTYLINE HYDROCHLORIDE 25 MG/1
TABLET, FILM COATED ORAL
Qty: 60 TABLET | Refills: 3 | Status: SHIPPED | OUTPATIENT
Start: 2023-02-27

## 2023-02-27 NOTE — TELEPHONE ENCOUNTER
Recent Visits  Date Type Provider Dept   09/14/22 Office Visit Ginger Arce, DO [facility]   07/06/22 Office Visit Ginger Arce, DO [facility]   06/08/22 Office Visit Ginger Arce, DO [facility]   11/15/21 Office Visit Ginger Arce, DO [facility]   Showing recent visits within past 540 days with a meds authorizing provider and meeting all other requirements  Future Appointments  Date Type Provider Dept   03/15/23 Appointment Ginger Arce, [address] [place] [address]   Showing future appointments within next 150 days with a meds authorizing provider and meeting all other requirements     Future Appointments   Date Time Provider Shanell Juarez   3/15/2023  1:20 PM Ginger Arce, [address] [place] [address]

## 2023-03-20 ENCOUNTER — TELEPHONE (OUTPATIENT)
Dept: FAMILY MEDICINE CLINIC | Age: 73
End: 2023-03-20

## 2023-03-20 NOTE — TELEPHONE ENCOUNTER
----- Message from Ilda Doty DO sent at 3/19/2023  5:09 PM EDT -----  Pt missed his apt with me last wk  Can you f/u with him, ensure doing ok (most importantly) and get r/s if able? Thanks!

## 2023-03-28 NOTE — PATIENT INSTRUCTIONS
LAB INSTRUCTIONS:    Please complete labs within 4 week(s). Please fast for 8 hours prior to lab collection. The clinic will call you within 1 week of collection. If you have not heard from us within that amount of time, please call us at [phone]. Learning About Vision Tests  What are vision tests? The four most common vision tests are visual acuity tests, refraction, visual field tests, and color vision tests. Visual acuity (sharpness) tests  These tests are used: To see if you need glasses or contact lenses. To monitor an eye problem. To check an eye injury. Visual acuity tests are done as part of routine exams. You may also have this test when you get your 's license or apply for some types of jobs. Visual field tests  These tests are used: To check for vision loss in any area of your range of vision. To screen for certain eye diseases. To look for nerve damage after a stroke, head injury, or other problem that could reduce blood flow to the brain. Refraction and color tests  A refraction test is done to find the right prescription for glasses and contact lenses. A color vision test is done to check for color blindness. Color vision is often tested as part of a routine exam. You may also have this test when you apply for a job where recognizing different colors is important, such as , electronics, or the [employer]. How are vision tests done? Visual acuity test   You cover one eye at a time. You read aloud from a wall chart across the room. You read aloud from a small card that you hold in your hand. Refraction   You look into a special device. The device puts lenses of different strengths in front of each eye to see how strong your glasses or contact lenses need to be. Visual field tests   Your doctor may have you look through special machines.   Or your doctor may simply have you stare straight ahead while they move a finger into and out of your

## 2023-03-28 NOTE — PROGRESS NOTES
Chief Complaint   Patient presents with    Medicare AWV    Follow-up     Chronic issues noted below       History obtained from the patient. SUBJECTIVE:  Piotr Lopez is a 74 y.o. male that presents today for     -ED f/u PRIOR VISIT:  Seen in ER on 7/3  Was exercising in the heat and didn't drink water  Had a brief episode where when he looked to the R, his vision was blurry  Lasted a few min and resolved  Once got cool all sxs gone  No vision loss or amaurosis sxs  No HA  No syncope or near syncope  daugther recommended ER  In ER had labs that were neg  Had CT head that was neg  Had CTA head and neck that was neg  They started him on Plavix and zocor and told to f/u here to discuss MRI brain  Pt feels great today  No recurrence of sxs  No stroke like sxs  Discussed MRI brain  Pt doesn't want to do MRI or take meds from ER, feels he's fine    UPDATE TODAY:   Doing well  No recurrence of above sxs since last visit  Declines further intervention  Recent saw eye doctor with neg w/u      -Depression/Anxiety/Grief PRIOR VISIT:  Wife passed away a few wks ago  Khanh [place]en[place] while I was out 2 wks ago  Given short course of klonopin  Pt feeling a little better  Still with anxiety  Denies sig depression  No SI/HI  Daughter in law is a pharmacist and recommended buspar, he'd like to add that  Elavil con't to help with his chronic insomnia    UPDATE PRIOR VISIT:   On buspar from last visit and prn klonopin  Making progress  Happy with how meds are working.  Wants to con't w/o change  No SI/HI    UPDATE TODAY:   Doing ok  A bit better than 6 months ago  Moods stable  Buspar working well enough as does elavil for sleep  Weaned off klonopin d/t some GI upset  No sI/HI         -Elevated a1c PRIOR VISIT  Noted on labs  Hx of preDM  No hx of DM  Diet worse the last 6 months  Has already started to make changes  Has lost some wt     UPDATE PRIOR VISIT:   Due for f/u labs  Have improved from prior  Wants to wait right now, does

## 2023-03-29 ENCOUNTER — OFFICE VISIT (OUTPATIENT)
Dept: FAMILY MEDICINE CLINIC | Age: 73
End: 2023-03-29
Payer: MEDICARE

## 2023-03-29 VITALS
SYSTOLIC BLOOD PRESSURE: 122 MMHG | WEIGHT: 233.2 LBS | DIASTOLIC BLOOD PRESSURE: 72 MMHG | OXYGEN SATURATION: 98 % | HEIGHT: 72 IN | RESPIRATION RATE: 16 BRPM | BODY MASS INDEX: 31.59 KG/M2 | HEART RATE: 74 BPM | TEMPERATURE: 97.6 F

## 2023-03-29 DIAGNOSIS — E78.00 PURE HYPERCHOLESTEROLEMIA: ICD-10-CM

## 2023-03-29 DIAGNOSIS — R73.03 PREDIABETES: ICD-10-CM

## 2023-03-29 DIAGNOSIS — F43.21 GRIEF: ICD-10-CM

## 2023-03-29 DIAGNOSIS — I10 ESSENTIAL HYPERTENSION: ICD-10-CM

## 2023-03-29 DIAGNOSIS — F43.0 ACUTE STRESS REACTION: ICD-10-CM

## 2023-03-29 DIAGNOSIS — H93.13 TINNITUS OF BOTH EARS: ICD-10-CM

## 2023-03-29 DIAGNOSIS — Z00.00 MEDICARE ANNUAL WELLNESS VISIT, SUBSEQUENT: Primary | ICD-10-CM

## 2023-03-29 DIAGNOSIS — H53.8 BLURRY VISION, RIGHT EYE: ICD-10-CM

## 2023-03-29 DIAGNOSIS — E86.0 DEHYDRATION: ICD-10-CM

## 2023-03-29 DIAGNOSIS — R73.9 HYPERGLYCEMIA: ICD-10-CM

## 2023-03-29 DIAGNOSIS — F51.01 PRIMARY INSOMNIA: ICD-10-CM

## 2023-03-29 DIAGNOSIS — F41.9 ANXIETY: ICD-10-CM

## 2023-03-29 PROCEDURE — G0439 PPPS, SUBSEQ VISIT: HCPCS | Performed by: FAMILY MEDICINE

## 2023-03-29 PROCEDURE — 3078F DIAST BP <80 MM HG: CPT | Performed by: FAMILY MEDICINE

## 2023-03-29 PROCEDURE — 1123F ACP DISCUSS/DSCN MKR DOCD: CPT | Performed by: FAMILY MEDICINE

## 2023-03-29 PROCEDURE — 3074F SYST BP LT 130 MM HG: CPT | Performed by: FAMILY MEDICINE

## 2023-03-29 SDOH — ECONOMIC STABILITY: FOOD INSECURITY: WITHIN THE PAST 12 MONTHS, YOU WORRIED THAT YOUR FOOD WOULD RUN OUT BEFORE YOU GOT MONEY TO BUY MORE.: NEVER TRUE

## 2023-03-29 SDOH — ECONOMIC STABILITY: HOUSING INSECURITY
IN THE LAST 12 MONTHS, WAS THERE A TIME WHEN YOU DID NOT HAVE A STEADY PLACE TO SLEEP OR SLEPT IN A SHELTER (INCLUDING NOW)?: NO

## 2023-03-29 SDOH — ECONOMIC STABILITY: INCOME INSECURITY: HOW HARD IS IT FOR YOU TO PAY FOR THE VERY BASICS LIKE FOOD, HOUSING, MEDICAL CARE, AND HEATING?: NOT HARD AT ALL

## 2023-03-29 SDOH — ECONOMIC STABILITY: FOOD INSECURITY: WITHIN THE PAST 12 MONTHS, THE FOOD YOU BOUGHT JUST DIDN'T LAST AND YOU DIDN'T HAVE MONEY TO GET MORE.: NEVER TRUE

## 2023-03-29 ASSESSMENT — PATIENT HEALTH QUESTIONNAIRE - PHQ9
SUM OF ALL RESPONSES TO PHQ QUESTIONS 1-9: 1
SUM OF ALL RESPONSES TO PHQ9 QUESTIONS 1 & 2: 1
2. FEELING DOWN, DEPRESSED OR HOPELESS: 1
SUM OF ALL RESPONSES TO PHQ QUESTIONS 1-9: 1
1. LITTLE INTEREST OR PLEASURE IN DOING THINGS: 0
SUM OF ALL RESPONSES TO PHQ QUESTIONS 1-9: 1
SUM OF ALL RESPONSES TO PHQ QUESTIONS 1-9: 1

## 2023-03-29 ASSESSMENT — LIFESTYLE VARIABLES
HOW OFTEN DO YOU HAVE A DRINK CONTAINING ALCOHOL: NEVER
HOW MANY STANDARD DRINKS CONTAINING ALCOHOL DO YOU HAVE ON A TYPICAL DAY: PATIENT DOES NOT DRINK

## 2023-04-07 ENCOUNTER — NURSE ONLY (OUTPATIENT)
Dept: LAB | Age: 73
End: 2023-04-07

## 2023-04-07 DIAGNOSIS — R73.03 PREDIABETES: ICD-10-CM

## 2023-04-07 DIAGNOSIS — R73.9 HYPERGLYCEMIA: ICD-10-CM

## 2023-04-07 LAB
DEPRECATED MEAN GLUCOSE BLD GHB EST-ACNC: 129 MG/DL (ref 70–126)
GLUCOSE SERPL-MCNC: 105 MG/DL (ref 70–108)
HBA1C MFR BLD HPLC: 6.3 % (ref 4.4–6.4)

## 2023-05-30 RX ORDER — LISINOPRIL AND HYDROCHLOROTHIAZIDE 12.5; 1 MG/1; MG/1
TABLET ORAL
Qty: 90 TABLET | Refills: 0 | Status: ON HOLD | OUTPATIENT
Start: 2023-05-30

## 2023-05-30 NOTE — TELEPHONE ENCOUNTER
Recent Visits  Date Type Provider Dept   03/29/23 Office Visit Jose Gross, DO [facility]   09/14/22 Office Visit Jose Gross, DO [facility]   07/06/22 Office Visit Jose Gross, DO [facility]   06/08/22 Office Visit Jose Gross, DO [facility]   Showing recent visits within past 540 days with a meds authorizing provider and meeting all other requirements  Future Appointments  Date Type Provider Dept   10/02/23 Appointment Jose Gross [address] [place] [address]   Showing future appointments within next 150 days with a meds authorizing provider and meeting all other requirements      Future Appointments   Date Time Provider Shanell Juarez   10/2/2023  3:20 PM Jose Gross, [address] [place] [address]

## 2023-06-02 ENCOUNTER — HOSPITAL ENCOUNTER (INPATIENT)
Age: 73
LOS: 3 days | Discharge: HOME | DRG: 176 | End: 2023-06-05
Attending: EMERGENCY MEDICINE | Admitting: INTERNAL MEDICINE
Payer: MEDICARE

## 2023-06-02 ENCOUNTER — APPOINTMENT (OUTPATIENT)
Dept: GENERAL RADIOLOGY | Age: 73
DRG: 176 | End: 2023-06-02
Payer: MEDICARE

## 2023-06-02 ENCOUNTER — APPOINTMENT (OUTPATIENT)
Dept: CT IMAGING | Age: 73
DRG: 176 | End: 2023-06-02
Payer: MEDICARE

## 2023-06-02 DIAGNOSIS — F32.89 OTHER DEPRESSION: Primary | ICD-10-CM

## 2023-06-02 DIAGNOSIS — I26.99 PULMONARY EMBOLISM, BILATERAL (HCC): ICD-10-CM

## 2023-06-02 DIAGNOSIS — F41.1 ANXIETY STATE: ICD-10-CM

## 2023-06-02 DIAGNOSIS — F51.01 PRIMARY INSOMNIA: ICD-10-CM

## 2023-06-02 DIAGNOSIS — R94.31 PROLONGED QT INTERVAL: ICD-10-CM

## 2023-06-02 LAB
ANION GAP SERPL CALC-SCNC: 11 MEQ/L (ref 8–16)
APTT: 31.5 SECONDS (ref 22–38)
BASOPHILS # BLD: 0.5 % (ref 0–3)
BASOPHILS ABSOLUTE: 0 THOU/MM3 (ref 0–0.1)
BUN SERPL-MCNC: 19 MG/DL (ref 7–18)
CALCIUM SERPL-MCNC: 9 MG/DL (ref 8.5–10.1)
CHLORIDE SERPL-SCNC: 103 MEQ/L (ref 98–107)
CO2 SERPL-SCNC: 27 MEQ/L (ref 21–32)
CREAT SERPL-MCNC: 1.1 MG/DL (ref 0.6–1.3)
EKG ATRIAL RATE: 105 BPM
EKG ATRIAL RATE: 109 BPM
EKG P AXIS: 32 DEGREES
EKG P AXIS: 37 DEGREES
EKG P-R INTERVAL: 166 MS
EKG P-R INTERVAL: 182 MS
EKG Q-T INTERVAL: 354 MS
EKG Q-T INTERVAL: 364 MS
EKG QRS DURATION: 96 MS
EKG QRS DURATION: 98 MS
EKG QTC CALCULATION (BAZETT): 467 MS
EKG QTC CALCULATION (BAZETT): 490 MS
EKG R AXIS: -26 DEGREES
EKG R AXIS: -28 DEGREES
EKG T AXIS: 28 DEGREES
EKG T AXIS: 5 DEGREES
EKG VENTRICULAR RATE: 105 BPM
EKG VENTRICULAR RATE: 109 BPM
EOSINOPHILS ABSOLUTE: 0.1 THOU/MM3 (ref 0–0.5)
EOSINOPHILS RELATIVE PERCENT: 1.1 % (ref 0–4)
GFR SERPL CREATININE-BSD FRML MDRD: > 60 ML/MIN/1.73M2
GLUCOSE SERPL-MCNC: 125 MG/DL (ref 74–106)
HCT VFR BLD CALC: 42.9 % (ref 42–52)
HEMOGLOBIN: 14.3 GM/DL (ref 14–18)
HEPARIN UNFRACTIONATED: < 0.04 U/ML (ref 0.3–0.7)
IMMATURE GRANS (ABS): 0.01 THOU/MM3 (ref 0–0.07)
IMMATURE GRANULOCYTES: 0 %
INR BLD: 1.33 (ref 0.85–1.13)
LYMPHOCYTES # BLD AUTO: 25.1 % (ref 15–47)
LYMPHOCYTES ABSOLUTE: 1.9 THOU/MM3 (ref 1–4.8)
MAGNESIUM SERPL-MCNC: 1.9 MG/DL (ref 1.8–2.4)
MCH RBC QN AUTO: 30.4 PG (ref 26–32)
MCHC RBC AUTO-ENTMCNC: 33.3 GM/DL (ref 31–35)
MCV RBC AUTO: 91.3 FL (ref 80–94)
MONOCYTES: 1 THOU/MM3 (ref 0.3–1.3)
MONOCYTES: 13.1 % (ref 0–12)
NT PRO BNP: 45 PG/ML (ref 0–900)
PDW BLD-RTO: 13 % (ref 11.5–14.9)
PHOSPHATE SERPL-MCNC: 2.5 MG/DL (ref 2.4–4.7)
PLATELET # BLD AUTO: 156 THOU/MM3 (ref 130–400)
PMV BLD AUTO: 9.7 FL (ref 9.4–12.4)
POTASSIUM SERPL-SCNC: 3.6 MEQ/L (ref 3.5–5.1)
RBC # BLD: 4.7 MILL/MM3 (ref 4.5–6.1)
SEG NEUTROPHILS: 60.1 % (ref 43–75)
SEGMENTED NEUTROPHILS ABSOLUTE COUNT: 4.5 THOU/MM3 (ref 1.8–7.7)
SODIUM SERPL-SCNC: 141 MEQ/L (ref 136–145)
TROPONIN, HIGH SENSITIVITY: 117.6 PG/ML (ref 0–76.1)
TROPONIN, HIGH SENSITIVITY: 41.6 PG/ML (ref 0–76.1)
TSH SERPL DL<=0.005 MIU/L-ACNC: 0.57 UIU/ML (ref 0.4–4.2)
WBC # BLD: 7.5 THOU/MM3 (ref 4.8–10.8)

## 2023-06-02 PROCEDURE — 2000000000 HC ICU R&B

## 2023-06-02 PROCEDURE — 71046 X-RAY EXAM CHEST 2 VIEWS: CPT

## 2023-06-02 PROCEDURE — 85730 THROMBOPLASTIN TIME PARTIAL: CPT

## 2023-06-02 PROCEDURE — 93010 ELECTROCARDIOGRAM REPORT: CPT | Performed by: NUCLEAR MEDICINE

## 2023-06-02 PROCEDURE — 85025 COMPLETE CBC W/AUTO DIFF WBC: CPT

## 2023-06-02 PROCEDURE — 96365 THER/PROPH/DIAG IV INF INIT: CPT

## 2023-06-02 PROCEDURE — 99285 EMERGENCY DEPT VISIT HI MDM: CPT

## 2023-06-02 PROCEDURE — 87641 MR-STAPH DNA AMP PROBE: CPT

## 2023-06-02 PROCEDURE — 85520 HEPARIN ASSAY: CPT

## 2023-06-02 PROCEDURE — 84484 ASSAY OF TROPONIN QUANT: CPT

## 2023-06-02 PROCEDURE — 6370000000 HC RX 637 (ALT 250 FOR IP): Performed by: STUDENT IN AN ORGANIZED HEALTH CARE EDUCATION/TRAINING PROGRAM

## 2023-06-02 PROCEDURE — 80048 BASIC METABOLIC PNL TOTAL CA: CPT

## 2023-06-02 PROCEDURE — 36415 COLL VENOUS BLD VENIPUNCTURE: CPT

## 2023-06-02 PROCEDURE — 83735 ASSAY OF MAGNESIUM: CPT

## 2023-06-02 PROCEDURE — 84443 ASSAY THYROID STIM HORMONE: CPT

## 2023-06-02 PROCEDURE — 87070 CULTURE OTHR SPECIMN AEROBIC: CPT

## 2023-06-02 PROCEDURE — 93005 ELECTROCARDIOGRAM TRACING: CPT | Performed by: STUDENT IN AN ORGANIZED HEALTH CARE EDUCATION/TRAINING PROGRAM

## 2023-06-02 PROCEDURE — 85610 PROTHROMBIN TIME: CPT

## 2023-06-02 PROCEDURE — 83880 ASSAY OF NATRIURETIC PEPTIDE: CPT

## 2023-06-02 PROCEDURE — 6360000004 HC RX CONTRAST MEDICATION: Performed by: STUDENT IN AN ORGANIZED HEALTH CARE EDUCATION/TRAINING PROGRAM

## 2023-06-02 PROCEDURE — 71275 CT ANGIOGRAPHY CHEST: CPT

## 2023-06-02 PROCEDURE — 6360000002 HC RX W HCPCS: Performed by: STUDENT IN AN ORGANIZED HEALTH CARE EDUCATION/TRAINING PROGRAM

## 2023-06-02 PROCEDURE — 84100 ASSAY OF PHOSPHORUS: CPT

## 2023-06-02 PROCEDURE — 6370000000 HC RX 637 (ALT 250 FOR IP)

## 2023-06-02 RX ORDER — ASCORBIC ACID 500 MG
250 TABLET ORAL DAILY
Status: DISCONTINUED | OUTPATIENT
Start: 2023-06-03 | End: 2023-06-05 | Stop reason: HOSPADM

## 2023-06-02 RX ORDER — HEPARIN SODIUM 1000 [USP'U]/ML
80 INJECTION, SOLUTION INTRAVENOUS; SUBCUTANEOUS PRN
Status: DISCONTINUED | OUTPATIENT
Start: 2023-06-02 | End: 2023-06-03

## 2023-06-02 RX ORDER — LORAZEPAM 2 MG/ML
0.5 INJECTION INTRAMUSCULAR ONCE
Status: DISCONTINUED | OUTPATIENT
Start: 2023-06-02 | End: 2023-06-05 | Stop reason: HOSPADM

## 2023-06-02 RX ORDER — HEPARIN SODIUM 1000 [USP'U]/ML
80 INJECTION, SOLUTION INTRAVENOUS; SUBCUTANEOUS ONCE
Status: COMPLETED | OUTPATIENT
Start: 2023-06-02 | End: 2023-06-02

## 2023-06-02 RX ORDER — VITAMIN B COMPLEX
1000 TABLET ORAL DAILY
Status: DISCONTINUED | OUTPATIENT
Start: 2023-06-03 | End: 2023-06-05 | Stop reason: HOSPADM

## 2023-06-02 RX ORDER — BUSPIRONE HYDROCHLORIDE 7.5 MG/1
7.5 TABLET ORAL 2 TIMES DAILY
Status: DISCONTINUED | OUTPATIENT
Start: 2023-06-03 | End: 2023-06-05 | Stop reason: HOSPADM

## 2023-06-02 RX ORDER — MAGNESIUM SULFATE 1 G/100ML
1000 INJECTION INTRAVENOUS ONCE
Status: COMPLETED | OUTPATIENT
Start: 2023-06-02 | End: 2023-06-02

## 2023-06-02 RX ORDER — HEPARIN SODIUM 10000 [USP'U]/100ML
5-30 INJECTION, SOLUTION INTRAVENOUS CONTINUOUS
Status: DISCONTINUED | OUTPATIENT
Start: 2023-06-02 | End: 2023-06-03

## 2023-06-02 RX ORDER — AMITRIPTYLINE HYDROCHLORIDE 25 MG/1
25 TABLET, FILM COATED ORAL NIGHTLY
Status: DISCONTINUED | OUTPATIENT
Start: 2023-06-03 | End: 2023-06-05 | Stop reason: HOSPADM

## 2023-06-02 RX ORDER — ASPIRIN 81 MG/1
324 TABLET, CHEWABLE ORAL ONCE
Status: COMPLETED | OUTPATIENT
Start: 2023-06-02 | End: 2023-06-02

## 2023-06-02 RX ORDER — HEPARIN SODIUM 1000 [USP'U]/ML
40 INJECTION, SOLUTION INTRAVENOUS; SUBCUTANEOUS PRN
Status: DISCONTINUED | OUTPATIENT
Start: 2023-06-02 | End: 2023-06-03

## 2023-06-02 RX ORDER — LANOLIN ALCOHOL/MO/W.PET/CERES
3 CREAM (GRAM) TOPICAL
Qty: 30 TABLET | Refills: 2 | Status: SHIPPED | OUTPATIENT
Start: 2023-06-02 | End: 2023-08-31

## 2023-06-02 RX ADMIN — MAGNESIUM SULFATE HEPTAHYDRATE 1000 MG: 1 INJECTION, SOLUTION INTRAVENOUS at 15:37

## 2023-06-02 RX ADMIN — IOPAMIDOL 100 ML: 755 INJECTION, SOLUTION INTRAVENOUS at 17:43

## 2023-06-02 RX ADMIN — HEPARIN SODIUM 7940 UNITS: 1000 INJECTION INTRAVENOUS; SUBCUTANEOUS at 18:24

## 2023-06-02 RX ADMIN — HEPARIN SODIUM 18 UNITS/KG/HR: 10000 INJECTION, SOLUTION INTRAVENOUS at 18:27

## 2023-06-02 RX ADMIN — ASPIRIN 81 MG 324 MG: 81 TABLET ORAL at 17:07

## 2023-06-02 RX ADMIN — BUSPIRONE HYDROCHLORIDE 7.5 MG: 7.5 TABLET ORAL at 23:46

## 2023-06-02 RX ADMIN — AMITRIPTYLINE HYDROCHLORIDE 25 MG: 25 TABLET, FILM COATED ORAL at 23:46

## 2023-06-02 ASSESSMENT — PAIN - FUNCTIONAL ASSESSMENT
PAIN_FUNCTIONAL_ASSESSMENT: NONE - DENIES PAIN

## 2023-06-02 ASSESSMENT — ENCOUNTER SYMPTOMS
EYES NEGATIVE: 1
RESPIRATORY NEGATIVE: 1
ALLERGIC/IMMUNOLOGIC NEGATIVE: 1
GASTROINTESTINAL NEGATIVE: 1

## 2023-06-03 LAB
ALBUMIN SERPL BCG-MCNC: 3.8 G/DL (ref 3.5–5.1)
ALP SERPL-CCNC: 80 U/L (ref 38–126)
ALT SERPL W/O P-5'-P-CCNC: 18 U/L (ref 11–66)
ANION GAP SERPL CALC-SCNC: 16 MEQ/L (ref 8–16)
AST SERPL-CCNC: 22 U/L (ref 5–40)
BASOPHILS ABSOLUTE: 0.1 THOU/MM3 (ref 0–0.1)
BASOPHILS NFR BLD AUTO: 0.7 %
BILIRUB SERPL-MCNC: 1 MG/DL (ref 0.3–1.2)
BUN SERPL-MCNC: 15 MG/DL (ref 7–22)
CALCIUM SERPL-MCNC: 9.1 MG/DL (ref 8.5–10.5)
CHLORIDE SERPL-SCNC: 104 MEQ/L (ref 98–111)
CO2 SERPL-SCNC: 19 MEQ/L (ref 23–33)
CREAT SERPL-MCNC: 0.8 MG/DL (ref 0.4–1.2)
DEPRECATED MEAN GLUCOSE BLD GHB EST-ACNC: 129 MG/DL (ref 70–126)
DEPRECATED RDW RBC AUTO: 44.8 FL (ref 35–45)
EOSINOPHIL NFR BLD AUTO: 0.8 %
EOSINOPHILS ABSOLUTE: 0.1 THOU/MM3 (ref 0–0.4)
ERYTHROCYTE [DISTWIDTH] IN BLOOD BY AUTOMATED COUNT: 13.3 % (ref 11.5–14.5)
GFR SERPL CREATININE-BSD FRML MDRD: > 60 ML/MIN/1.73M2
GLUCOSE SERPL-MCNC: 124 MG/DL (ref 70–108)
HBA1C MFR BLD HPLC: 6.3 % (ref 4.4–6.4)
HCT VFR BLD AUTO: 43.7 % (ref 42–52)
HEPARIN UNFRACTIONATED: 0.73 U/ML (ref 0.3–0.7)
HEPARIN UNFRACTIONATED: 0.81 U/ML (ref 0.3–0.7)
HEPARIN UNFRACTIONATED: 0.83 U/ML (ref 0.3–0.7)
HGB BLD-MCNC: 14.4 GM/DL (ref 14–18)
IMM GRANULOCYTES # BLD AUTO: 0.05 THOU/MM3 (ref 0–0.07)
IMM GRANULOCYTES NFR BLD AUTO: 0.5 %
LYMPHOCYTES ABSOLUTE: 2.8 THOU/MM3 (ref 1–4.8)
LYMPHOCYTES NFR BLD AUTO: 28.1 %
MAGNESIUM SERPL-MCNC: 2.2 MG/DL (ref 1.6–2.4)
MCH RBC QN AUTO: 30.3 PG (ref 26–33)
MCHC RBC AUTO-ENTMCNC: 33 GM/DL (ref 32.2–35.5)
MCV RBC AUTO: 91.8 FL (ref 80–94)
MONOCYTES ABSOLUTE: 1.1 THOU/MM3 (ref 0.4–1.3)
MONOCYTES NFR BLD AUTO: 10.9 %
MRSA DNA SPEC QL NAA+PROBE: NEGATIVE
NEUTROPHILS NFR BLD AUTO: 59 %
NRBC BLD AUTO-RTO: 0 /100 WBC
PHOSPHATE SERPL-MCNC: 2.7 MG/DL (ref 2.4–4.7)
PLATELET # BLD AUTO: 177 THOU/MM3 (ref 130–400)
PMV BLD AUTO: 10.9 FL (ref 9.4–12.4)
POTASSIUM SERPL-SCNC: 3.6 MEQ/L (ref 3.5–5.2)
PROT SERPL-MCNC: 7.8 G/DL (ref 6.1–8)
RBC # BLD AUTO: 4.76 MILL/MM3 (ref 4.7–6.1)
SEGMENTED NEUTROPHILS ABSOLUTE COUNT: 6 THOU/MM3 (ref 1.8–7.7)
SODIUM SERPL-SCNC: 139 MEQ/L (ref 135–145)
TROPONIN T: 0.03 NG/ML
TROPONIN T: < 0.01 NG/ML
WBC # BLD AUTO: 10.1 THOU/MM3 (ref 4.8–10.8)

## 2023-06-03 PROCEDURE — 81291 MTHFR GENE: CPT

## 2023-06-03 PROCEDURE — 85220 BLOOC CLOT FACTOR V TEST: CPT

## 2023-06-03 PROCEDURE — 6360000002 HC RX W HCPCS: Performed by: STUDENT IN AN ORGANIZED HEALTH CARE EDUCATION/TRAINING PROGRAM

## 2023-06-03 PROCEDURE — 6370000000 HC RX 637 (ALT 250 FOR IP)

## 2023-06-03 PROCEDURE — 85520 HEPARIN ASSAY: CPT

## 2023-06-03 PROCEDURE — 85415 FIBRINOLYTIC PLASMINOGEN: CPT

## 2023-06-03 PROCEDURE — 6360000002 HC RX W HCPCS: Performed by: NURSE PRACTITIONER

## 2023-06-03 PROCEDURE — 36415 COLL VENOUS BLD VENIPUNCTURE: CPT

## 2023-06-03 PROCEDURE — 85025 COMPLETE CBC W/AUTO DIFF WBC: CPT

## 2023-06-03 PROCEDURE — 83090 ASSAY OF HOMOCYSTEINE: CPT

## 2023-06-03 PROCEDURE — 86147 CARDIOLIPIN ANTIBODY EA IG: CPT

## 2023-06-03 PROCEDURE — 85306 CLOT INHIBIT PROT S FREE: CPT

## 2023-06-03 PROCEDURE — 85303 CLOT INHIBIT PROT C ACTIVITY: CPT

## 2023-06-03 PROCEDURE — 2580000003 HC RX 258: Performed by: NURSE PRACTITIONER

## 2023-06-03 PROCEDURE — 6370000000 HC RX 637 (ALT 250 FOR IP): Performed by: NURSE PRACTITIONER

## 2023-06-03 PROCEDURE — 2500000003 HC RX 250 WO HCPCS: Performed by: NURSE PRACTITIONER

## 2023-06-03 PROCEDURE — 80053 COMPREHEN METABOLIC PANEL: CPT

## 2023-06-03 PROCEDURE — 84100 ASSAY OF PHOSPHORUS: CPT

## 2023-06-03 PROCEDURE — 83735 ASSAY OF MAGNESIUM: CPT

## 2023-06-03 PROCEDURE — 84484 ASSAY OF TROPONIN QUANT: CPT

## 2023-06-03 PROCEDURE — 83036 HEMOGLOBIN GLYCOSYLATED A1C: CPT

## 2023-06-03 PROCEDURE — 2140000000 HC CCU INTERMEDIATE R&B

## 2023-06-03 PROCEDURE — 86146 BETA-2 GLYCOPROTEIN ANTIBODY: CPT

## 2023-06-03 RX ORDER — LISINOPRIL AND HYDROCHLOROTHIAZIDE 12.5; 1 MG/1; MG/1
1 TABLET ORAL DAILY
Status: DISCONTINUED | OUTPATIENT
Start: 2023-06-03 | End: 2023-06-03 | Stop reason: CLARIF

## 2023-06-03 RX ORDER — ENOXAPARIN SODIUM 100 MG/ML
1 INJECTION SUBCUTANEOUS EVERY 12 HOURS
Status: DISCONTINUED | OUTPATIENT
Start: 2023-06-03 | End: 2023-06-05 | Stop reason: HOSPADM

## 2023-06-03 RX ORDER — HYDROCHLOROTHIAZIDE 25 MG/1
12.5 TABLET ORAL DAILY
Status: DISCONTINUED | OUTPATIENT
Start: 2023-06-03 | End: 2023-06-05 | Stop reason: HOSPADM

## 2023-06-03 RX ORDER — HYDROXYZINE HYDROCHLORIDE 10 MG/1
10 TABLET, FILM COATED ORAL 3 TIMES DAILY PRN
Status: DISCONTINUED | OUTPATIENT
Start: 2023-06-03 | End: 2023-06-05 | Stop reason: HOSPADM

## 2023-06-03 RX ORDER — LISINOPRIL 10 MG/1
10 TABLET ORAL DAILY
Status: DISCONTINUED | OUTPATIENT
Start: 2023-06-03 | End: 2023-06-05 | Stop reason: HOSPADM

## 2023-06-03 RX ORDER — ATORVASTATIN CALCIUM 40 MG/1
40 TABLET, FILM COATED ORAL NIGHTLY
Status: DISCONTINUED | OUTPATIENT
Start: 2023-06-03 | End: 2023-06-05 | Stop reason: HOSPADM

## 2023-06-03 RX ORDER — LABETALOL HYDROCHLORIDE 5 MG/ML
5 INJECTION, SOLUTION INTRAVENOUS EVERY 4 HOURS PRN
Status: DISCONTINUED | OUTPATIENT
Start: 2023-06-03 | End: 2023-06-05 | Stop reason: HOSPADM

## 2023-06-03 RX ADMIN — OXYCODONE HYDROCHLORIDE AND ACETAMINOPHEN 250 MG: 500 TABLET ORAL at 08:51

## 2023-06-03 RX ADMIN — HYDROXYZINE HYDROCHLORIDE 10 MG: 10 TABLET ORAL at 00:51

## 2023-06-03 RX ADMIN — ATORVASTATIN CALCIUM 40 MG: 40 TABLET, FILM COATED ORAL at 21:55

## 2023-06-03 RX ADMIN — HYDROXYZINE HYDROCHLORIDE 10 MG: 10 TABLET ORAL at 08:52

## 2023-06-03 RX ADMIN — BUSPIRONE HYDROCHLORIDE 7.5 MG: 7.5 TABLET ORAL at 08:51

## 2023-06-03 RX ADMIN — AMITRIPTYLINE HYDROCHLORIDE 25 MG: 25 TABLET, FILM COATED ORAL at 21:55

## 2023-06-03 RX ADMIN — HYDROCHLOROTHIAZIDE 12.5 MG: 25 TABLET ORAL at 11:07

## 2023-06-03 RX ADMIN — LISINOPRIL 10 MG: 10 TABLET ORAL at 11:07

## 2023-06-03 RX ADMIN — Medication 5 MG: at 12:09

## 2023-06-03 RX ADMIN — SODIUM CHLORIDE, PRESERVATIVE FREE 20 MG: 5 INJECTION INTRAVENOUS at 08:50

## 2023-06-03 RX ADMIN — BUSPIRONE HYDROCHLORIDE 7.5 MG: 7.5 TABLET ORAL at 21:55

## 2023-06-03 RX ADMIN — ENOXAPARIN SODIUM 100 MG: 100 INJECTION SUBCUTANEOUS at 13:32

## 2023-06-03 RX ADMIN — HEPARIN SODIUM 15 UNITS/KG/HR: 10000 INJECTION, SOLUTION INTRAVENOUS at 08:01

## 2023-06-03 RX ADMIN — Medication 1000 UNITS: at 08:51

## 2023-06-04 ENCOUNTER — APPOINTMENT (OUTPATIENT)
Dept: ULTRASOUND IMAGING | Age: 73
DRG: 176 | End: 2023-06-04
Payer: MEDICARE

## 2023-06-04 PROBLEM — F32.A DEPRESSION: Status: ACTIVE | Noted: 2023-06-04

## 2023-06-04 LAB
ALBUMIN SERPL BCG-MCNC: 3.7 G/DL (ref 3.5–5.1)
ALP SERPL-CCNC: 78 U/L (ref 38–126)
ALT SERPL W/O P-5'-P-CCNC: 12 U/L (ref 11–66)
ANION GAP SERPL CALC-SCNC: 14 MEQ/L (ref 8–16)
AST SERPL-CCNC: 16 U/L (ref 5–40)
BACTERIA SPEC AEROBE CULT: NORMAL
BASOPHILS ABSOLUTE: 0.1 THOU/MM3 (ref 0–0.1)
BASOPHILS NFR BLD AUTO: 0.7 %
BILIRUB SERPL-MCNC: 1 MG/DL (ref 0.3–1.2)
BUN SERPL-MCNC: 19 MG/DL (ref 7–22)
CALCIUM SERPL-MCNC: 8.8 MG/DL (ref 8.5–10.5)
CHLORIDE SERPL-SCNC: 102 MEQ/L (ref 98–111)
CO2 SERPL-SCNC: 22 MEQ/L (ref 23–33)
CREAT SERPL-MCNC: 0.9 MG/DL (ref 0.4–1.2)
DEPRECATED RDW RBC AUTO: 45.8 FL (ref 35–45)
EOSINOPHIL NFR BLD AUTO: 1.2 %
EOSINOPHILS ABSOLUTE: 0.1 THOU/MM3 (ref 0–0.4)
ERYTHROCYTE [DISTWIDTH] IN BLOOD BY AUTOMATED COUNT: 13.2 % (ref 11.5–14.5)
GFR SERPL CREATININE-BSD FRML MDRD: > 60 ML/MIN/1.73M2
GLUCOSE SERPL-MCNC: 114 MG/DL (ref 70–108)
HCT VFR BLD AUTO: 46 % (ref 42–52)
HGB BLD-MCNC: 14.7 GM/DL (ref 14–18)
HOMOCYSTEINE: 12.1 UMOL/L
IMM GRANULOCYTES # BLD AUTO: 0.05 THOU/MM3 (ref 0–0.07)
IMM GRANULOCYTES NFR BLD AUTO: 0.5 %
LYMPHOCYTES ABSOLUTE: 2.8 THOU/MM3 (ref 1–4.8)
LYMPHOCYTES NFR BLD AUTO: 28.3 %
MAGNESIUM SERPL-MCNC: 2.1 MG/DL (ref 1.6–2.4)
MCH RBC QN AUTO: 30.2 PG (ref 26–33)
MCHC RBC AUTO-ENTMCNC: 32 GM/DL (ref 32.2–35.5)
MCV RBC AUTO: 94.7 FL (ref 80–94)
MONOCYTES ABSOLUTE: 1.2 THOU/MM3 (ref 0.4–1.3)
MONOCYTES NFR BLD AUTO: 11.8 %
NEUTROPHILS NFR BLD AUTO: 57.5 %
NRBC BLD AUTO-RTO: 0 /100 WBC
PHOSPHATE SERPL-MCNC: 3 MG/DL (ref 2.4–4.7)
PLATELET # BLD AUTO: 180 THOU/MM3 (ref 130–400)
PMV BLD AUTO: 10.4 FL (ref 9.4–12.4)
POTASSIUM SERPL-SCNC: 3.5 MEQ/L (ref 3.5–5.2)
PROT SERPL-MCNC: 6.9 G/DL (ref 6.1–8)
RBC # BLD AUTO: 4.86 MILL/MM3 (ref 4.7–6.1)
SEGMENTED NEUTROPHILS ABSOLUTE COUNT: 5.6 THOU/MM3 (ref 1.8–7.7)
SODIUM SERPL-SCNC: 138 MEQ/L (ref 135–145)
WBC # BLD AUTO: 9.8 THOU/MM3 (ref 4.8–10.8)

## 2023-06-04 PROCEDURE — 6370000000 HC RX 637 (ALT 250 FOR IP)

## 2023-06-04 PROCEDURE — 6370000000 HC RX 637 (ALT 250 FOR IP): Performed by: NURSE PRACTITIONER

## 2023-06-04 PROCEDURE — 93975 VASCULAR STUDY: CPT

## 2023-06-04 PROCEDURE — 2140000000 HC CCU INTERMEDIATE R&B

## 2023-06-04 PROCEDURE — 76705 ECHO EXAM OF ABDOMEN: CPT

## 2023-06-04 PROCEDURE — 85025 COMPLETE CBC W/AUTO DIFF WBC: CPT

## 2023-06-04 PROCEDURE — 84100 ASSAY OF PHOSPHORUS: CPT

## 2023-06-04 PROCEDURE — 6360000002 HC RX W HCPCS: Performed by: NURSE PRACTITIONER

## 2023-06-04 PROCEDURE — 80053 COMPREHEN METABOLIC PANEL: CPT

## 2023-06-04 PROCEDURE — 83735 ASSAY OF MAGNESIUM: CPT

## 2023-06-04 PROCEDURE — 99232 SBSQ HOSP IP/OBS MODERATE 35: CPT | Performed by: INTERNAL MEDICINE

## 2023-06-04 PROCEDURE — 36415 COLL VENOUS BLD VENIPUNCTURE: CPT

## 2023-06-04 RX ADMIN — BUSPIRONE HYDROCHLORIDE 7.5 MG: 7.5 TABLET ORAL at 08:55

## 2023-06-04 RX ADMIN — ATORVASTATIN CALCIUM 40 MG: 40 TABLET, FILM COATED ORAL at 20:48

## 2023-06-04 RX ADMIN — BUSPIRONE HYDROCHLORIDE 7.5 MG: 7.5 TABLET ORAL at 20:48

## 2023-06-04 RX ADMIN — ENOXAPARIN SODIUM 100 MG: 100 INJECTION SUBCUTANEOUS at 02:07

## 2023-06-04 RX ADMIN — OXYCODONE HYDROCHLORIDE AND ACETAMINOPHEN 250 MG: 500 TABLET ORAL at 08:55

## 2023-06-04 RX ADMIN — Medication 1000 UNITS: at 08:55

## 2023-06-04 RX ADMIN — ENOXAPARIN SODIUM 100 MG: 100 INJECTION SUBCUTANEOUS at 13:26

## 2023-06-04 RX ADMIN — AMITRIPTYLINE HYDROCHLORIDE 25 MG: 25 TABLET, FILM COATED ORAL at 22:42

## 2023-06-05 VITALS
HEIGHT: 72 IN | OXYGEN SATURATION: 97 % | RESPIRATION RATE: 21 BRPM | BODY MASS INDEX: 29.08 KG/M2 | TEMPERATURE: 97.8 F | DIASTOLIC BLOOD PRESSURE: 98 MMHG | HEART RATE: 97 BPM | SYSTOLIC BLOOD PRESSURE: 119 MMHG | WEIGHT: 214.7 LBS

## 2023-06-05 LAB
ALBUMIN SERPL BCG-MCNC: 3.8 G/DL (ref 3.5–5.1)
ALP SERPL-CCNC: 76 U/L (ref 38–126)
ALT SERPL W/O P-5'-P-CCNC: 14 U/L (ref 11–66)
ANION GAP SERPL CALC-SCNC: 13 MEQ/L (ref 8–16)
AST SERPL-CCNC: 19 U/L (ref 5–40)
BASOPHILS ABSOLUTE: 0.1 THOU/MM3 (ref 0–0.1)
BASOPHILS NFR BLD AUTO: 0.9 %
BILIRUB SERPL-MCNC: 0.9 MG/DL (ref 0.3–1.2)
BUN SERPL-MCNC: 24 MG/DL (ref 7–22)
CALCIUM SERPL-MCNC: 8.9 MG/DL (ref 8.5–10.5)
CHLORIDE SERPL-SCNC: 104 MEQ/L (ref 98–111)
CO2 SERPL-SCNC: 22 MEQ/L (ref 23–33)
CREAT SERPL-MCNC: 0.9 MG/DL (ref 0.4–1.2)
DEPRECATED RDW RBC AUTO: 44 FL (ref 35–45)
EOSINOPHIL NFR BLD AUTO: 1.8 %
EOSINOPHILS ABSOLUTE: 0.1 THOU/MM3 (ref 0–0.4)
ERYTHROCYTE [DISTWIDTH] IN BLOOD BY AUTOMATED COUNT: 13.1 % (ref 11.5–14.5)
GFR SERPL CREATININE-BSD FRML MDRD: > 60 ML/MIN/1.73M2
GLUCOSE SERPL-MCNC: 102 MG/DL (ref 70–108)
HCT VFR BLD AUTO: 46.4 % (ref 42–52)
HGB BLD-MCNC: 15.1 GM/DL (ref 14–18)
IMM GRANULOCYTES # BLD AUTO: 0.03 THOU/MM3 (ref 0–0.07)
IMM GRANULOCYTES NFR BLD AUTO: 0.4 %
LV EF: 55 %
LVEF MODALITY: NORMAL
LYMPHOCYTES ABSOLUTE: 3 THOU/MM3 (ref 1–4.8)
LYMPHOCYTES NFR BLD AUTO: 38 %
MAGNESIUM SERPL-MCNC: 2.1 MG/DL (ref 1.6–2.4)
MCH RBC QN AUTO: 30.3 PG (ref 26–33)
MCHC RBC AUTO-ENTMCNC: 32.5 GM/DL (ref 32.2–35.5)
MCV RBC AUTO: 93 FL (ref 80–94)
MONOCYTES ABSOLUTE: 1 THOU/MM3 (ref 0.4–1.3)
MONOCYTES NFR BLD AUTO: 12.8 %
NEUTROPHILS NFR BLD AUTO: 46.1 %
NRBC BLD AUTO-RTO: 0 /100 WBC
PHOSPHATE SERPL-MCNC: 3.5 MG/DL (ref 2.4–4.7)
PLATELET # BLD AUTO: 184 THOU/MM3 (ref 130–400)
PMV BLD AUTO: 10.7 FL (ref 9.4–12.4)
POTASSIUM SERPL-SCNC: 3.7 MEQ/L (ref 3.5–5.2)
PROT SERPL-MCNC: 7.1 G/DL (ref 6.1–8)
RBC # BLD AUTO: 4.99 MILL/MM3 (ref 4.7–6.1)
SEGMENTED NEUTROPHILS ABSOLUTE COUNT: 3.7 THOU/MM3 (ref 1.8–7.7)
SODIUM SERPL-SCNC: 139 MEQ/L (ref 135–145)
WBC # BLD AUTO: 8 THOU/MM3 (ref 4.8–10.8)

## 2023-06-05 PROCEDURE — 36415 COLL VENOUS BLD VENIPUNCTURE: CPT

## 2023-06-05 PROCEDURE — 6360000002 HC RX W HCPCS: Performed by: NURSE PRACTITIONER

## 2023-06-05 PROCEDURE — 83735 ASSAY OF MAGNESIUM: CPT

## 2023-06-05 PROCEDURE — 80053 COMPREHEN METABOLIC PANEL: CPT

## 2023-06-05 PROCEDURE — 93306 TTE W/DOPPLER COMPLETE: CPT

## 2023-06-05 PROCEDURE — 85025 COMPLETE CBC W/AUTO DIFF WBC: CPT

## 2023-06-05 PROCEDURE — 84100 ASSAY OF PHOSPHORUS: CPT

## 2023-06-05 PROCEDURE — 6370000000 HC RX 637 (ALT 250 FOR IP)

## 2023-06-05 RX ADMIN — ENOXAPARIN SODIUM 100 MG: 100 INJECTION SUBCUTANEOUS at 01:50

## 2023-06-05 RX ADMIN — BUSPIRONE HYDROCHLORIDE 7.5 MG: 7.5 TABLET ORAL at 08:14

## 2023-06-05 RX ADMIN — Medication 1000 UNITS: at 08:14

## 2023-06-05 RX ADMIN — ENOXAPARIN SODIUM 100 MG: 100 INJECTION SUBCUTANEOUS at 12:53

## 2023-06-05 RX ADMIN — OXYCODONE HYDROCHLORIDE AND ACETAMINOPHEN 250 MG: 500 TABLET ORAL at 08:14

## 2023-06-05 NOTE — PROGRESS NOTES
Pharmacy Medication History Note    List of current medications patient is taking is complete. Source of information: Patient and surescripts    Changes made to medication list:  Medications removed (include reason, ex. therapy complete or physician discontinued):  None    Medications added/doses adjusted:  None    Other notes (ex. Recent course of antibiotics, Coumadin dosing):  Denies use of other OTC or herbal medications.     Allergies reviewed    Electronically signed by Dina Boudreaux on 6/5/2023 at 2:22 PM

## 2023-06-05 NOTE — CARE COORDINATION
Case Management Assessment  Initial Evaluation    Date/Time of Evaluation: 6/5/2023 2:02 PM  Assessment Completed by: Brenda Medellin RN    If patient is discharged prior to next notation, then this note serves as note for discharge by case management. Patient Name: Anaid Glez                   YOB: 1950  Diagnosis: Primary insomnia [F51.01]  Anxiety state [F41.1]  Prolonged QT interval [R94.31]  Bilateral pulmonary embolism ([address] [I26.99]  Pulmonary embolism, bilateral ([address] [I26.99]  Other depression [F32.89]                   Date / Time: 6/2/2023  2:00 PM  Location: [address] [place] [address]     Patient Admission Status: Inpatient   Readmission Risk Low 0-14, Mod 15-19), High > 20: Readmission Risk Score: 6    Current PCP: Luke Baig, DO  PCP verified by CM? Yes    Chart Reviewed: Yes      History Provided by: Patient  Patient Orientation: Alert and Oriented    Patient Cognition: Alert    Hospitalization in the last 30 days (Readmission):  No    If yes, Readmission Assessment in CM Navigator will be completed. Advance Directives:      Code Status: Full Code   Patient's Primary Decision Maker is: Patient Declined (Legal Next of Kin Remains as Decision Maker)      Discharge Planning:    Patient lives with: Alone Type of Home: House  Primary Care Giver: Self  Patient Support Systems include: Children, Family Members   Current Financial resources: Medicare  Current community resources: None  Current services prior to admission: None            Current DME:              Type of Home Care services:  None    ADLS  Prior functional level: Independent in ADLs/IADLs  Current functional level: Independent in ADLs/IADLs    Family can provide assistance at DC: Yes  Would you like Case Management to discuss the discharge plan with any other family members/significant others, and if so, who?  No  Plans to Return to Present Housing: Yes  Other Identified Issues/Barriers to RETURNING to current housing:

## 2023-06-05 NOTE — PROGRESS NOTES
Evaluated Eliquis vs. Xarelto for Sai Elmore, [facility]    Eliquis: $47.00/month, patient eligible for free 30-day trial from [address] [place] [address]. Xarelto: $47.00/month, patient eligible for free 30-day trial from [address] [place] [address]. If you have any questions, please call me. Thank you!  Luc Fletcher CP - Prescription Assistance (ext. [phone]) 4/9/4922,8:83 AM

## 2023-06-05 NOTE — DISCHARGE SUMMARY
Pt is a 15yo , sent to [facility] from ED with c/o of low frontal ABD pain that comes and goes. Pt verbalized Dr Kerrie Pradhan is her MD and that her first appointment is tomorrow. \"I did not known I was pregnant until Friday. \" Rated a 5/10 on pain scale. She describes pain as a sharp, and sudden that when comes is a 5/10 and then no pain. Pt denies bleeding, leaking of fluid, HA, upper gastric pain or visual problems. FHR dopplered for 140's. Dr Sanjeev Castro at bedside and orders received, MD reviewed pt blood work. UA sent. Will wait for result and update MD. iMsa Lee and water given to pt. Pt refused a meal. Pt given call light and oriented to white board. medications      amitriptyline 25 MG tablet  Commonly known as: ELAVIL  TAKE 1 TO 2 TABLETS BY MOUTH NIGHTLY     busPIRone 7.5 MG tablet  Commonly known as: BUSPAR  Take 1 tablet by mouth twice daily     lisinopril-hydroCHLOROthiazide 10-12.5 MG per tablet  Commonly known as: PRINZIDE;ZESTORETIC  Take 1 tablet by mouth once daily     vitamin C 250 MG tablet     vitamin D 1000 UNIT Tabs tablet  Commonly known as: CHOLECALCIFEROL               Where to Get Your Medications        These medications were sent to [place], [place] [address]      Phone: [phone]   melatonin 3 MG Tabs tablet         Time Spent on discharge is more than 45 minutes in the examination, evaluation, counseling and review of medications and discharge plan. Signed: Thank you Marni Maza DO for the opportunity to be involved in this patient's care.     Electronically signed by Kate Tipton MD on 6/5/2023 at 10:14 AM

## 2023-06-05 NOTE — PLAN OF CARE
and symptoms of bleeding or hemorrhage  Taken 6/5/2023 0809  Maintains hematologic stability: Assess for signs and symptoms of bleeding or hemorrhage     Problem: Safety - Adult  Goal: Free from fall injury  6/5/2023 1102 by Tania Zhou RN  Outcome: Progressing  Flowsheets (Taken 6/5/2023 0800)  Free From Fall Injury: Instruct family/caregiver on patient safety  Note: Bed locked & in low position, call light in reach, side-rails up x2, bed/chair alarm utilized, non-slip socks on when ambulating, reminded patient to use call light to call for assistance. Care plan reviewed with patient. Patient verbalizes understanding of the care plan and contributed to goal setting.
at manageable levels  Description: INTERVENTIONS:  1. Administer medication as ordered  2. Teach and rehearse alternative coping skills  3. Provide emotional support with 1:1 interaction with staff  6/5/2023 0122 by Kriss Gaviria RN  Outcome: Progressing  Flowsheets (Taken 6/5/2023 0122)  Will report anxiety at manageable levels: Administer medication as ordered     Problem: Coping  Goal: Pt/Family able to verbalize concerns and demonstrate effective coping strategies  Description: INTERVENTIONS:  1. Assist patient/family to identify coping skills, available support systems and cultural and spiritual values  2. Provide emotional support, including active listening and acknowledgement of concerns of patient and caregivers  3. Reduce environmental stimuli, as able  4. Instruct patient/family in relaxation techniques, as appropriate  5. Assess for spiritual pain/suffering and initiate Spiritual Care, Psychosocial Clinical Specialist consults as needed  6/5/2023 0122 by Kriss Gaviria RN  Outcome: Progressing  Flowsheets (Taken 6/5/2023 0122)  Patient/family able to verbalize anxieties, fears, and concerns, and demonstrate effective coping:   Reduce environmental stimuli, as able   Provide emotional support, including active listening and acknowledgement of concerns of patient and caregivers   Instruct patient/family in relaxation techniques, as appropriate     Problem: ABCDS Injury Assessment  Goal: Absence of physical injury  6/5/2023 0122 by Kriss Gaviria RN  Outcome: Progressing  Flowsheets (Taken 6/5/2023 0122)  Absence of Physical Injury: Implement safety measures based on patient assessment   Care plan reviewed with patient. Patient verbalizes understanding of the care plan and contributed to goal setting.

## 2023-06-05 NOTE — PROGRESS NOTES
Discussed discharge summary with patient. Instructed patient about medications & follow up appointments. Patient denies any additional questions. Patient was discharged with all belongings. No distress noted. Patient discharged to home. Taken down to the vehicle by nurses aid per wheelchair. Patient received meds to beds.

## 2023-06-06 ENCOUNTER — CARE COORDINATION (OUTPATIENT)
Dept: CASE MANAGEMENT | Age: 73
End: 2023-06-06

## 2023-06-06 DIAGNOSIS — I26.99 BILATERAL PULMONARY EMBOLISM (HCC): Primary | ICD-10-CM

## 2023-06-06 LAB
B2 GLYCOPROT1 IGG SERPL IA-ACNC: < 10 SGU
B2 GLYCOPROT1 IGM SERPL IA-ACNC: < 10 SMU

## 2023-06-06 PROCEDURE — 1111F DSCHRG MED/CURRENT MED MERGE: CPT | Performed by: FAMILY MEDICINE

## 2023-06-06 NOTE — CARE COORDINATION
or concerns at this time. Were discharge instructions available to patient? Yes. Reviewed appropriate site of care based on symptoms and resources available to patient including: PCP  Specialist  Urgent care clinics  When to call 911. The patient agrees to contact the PCP office for questions related to their healthcare. Advance Care Planning:   Does patient have an Advance Directive:  discuss next call . Medication reconciliation was performed with patient, who verbalizes understanding of administration of home medications. Medications reviewed, 1111F entered: yes    Was patient discharged with a pulse oximeter? no    Non-face-to-face services provided:  Scheduled appointment with PCP-6/9    Offered patient enrollment in the Remote Patient Monitoring (RPM) program for in-home monitoring: NA.    Care Transitions 24 Hour Call    Care Transitions Interventions         Discussed follow-up appointments. If no appointment was previously scheduled, appointment scheduling offered: Yes. Is follow up appointment scheduled within 7 days of discharge? Yes. Follow Up  Future Appointments   Date Time Provider Shanell Juarez   6/9/2023 11:00 AM Anca Pena DO [facility] [place]   10/2/2023  3:20 PM Anca Pena  [address] Transition Nurse provided contact information. Plan for follow-up call in 5-7 days based on severity of symptoms and risk factors.   Plan for next call: symptom management-new or worsening symptoms  follow-up appointment-review PCP f/u 6/9, any changes    Orval CODEY Marinelli

## 2023-06-07 LAB
CARDIOLIPIN IGG SER IA-ACNC: < 10 GPL
CARDIOLIPIN IGM SER IA-ACNC: 20 MPL
FACT V ACT/NOR PPP: NORMAL %

## 2023-06-08 PROBLEM — F41.9 ANXIETY: Status: ACTIVE | Noted: 2023-06-08

## 2023-06-08 PROBLEM — F43.21 GRIEF: Status: ACTIVE | Noted: 2023-06-08

## 2023-06-08 PROBLEM — F32.A DEPRESSION: Chronic | Status: ACTIVE | Noted: 2023-06-04

## 2023-06-08 PROBLEM — I26.99 BILATERAL PULMONARY EMBOLISM (HCC): Status: RESOLVED | Noted: 2023-06-02 | Resolved: 2023-06-08

## 2023-06-08 LAB
F2 C.20210G>A GENO BLD/T: NEGATIVE
MTHFR C.1298A>C GENO BLD/T: NEGATIVE
MTHFR C.677C>T GENO BLD/T: NEGATIVE
MTHFR GENE MUT ANL BLD/T: NORMAL
PROT C ACT/NOR PPP: 105 % (ref 83–168)
PROT S ACT/NOR PPP: 90 % (ref 66–143)
SPECIMEN SOURCE: NORMAL
SPECIMEN SOURCE: NORMAL

## 2023-06-08 NOTE — PROGRESS NOTES
labetalol  Monitor vital signs     Unspecified granulomatous disease  CTA Chest 6/2: calcified granuloma in the RLL, R hilum, and subcarinal space and old granulomatous disease in the liver spleen. Differentials includes sarcoidosis, TB, hereditary granulomatous disease (pt denies fmhx)fungal or autoimmune etiologies all less likely but cannot be excluded.   -Noted; further investigation and management as indicated    MDD  Continue home buspar and amitryptyline. Discussed resources and asking PCP for resources. Overweight  Body mass index is 29.7 kg/m²    BPH  Stable. Noted. 3 mm Non-calcified nodule in the RLL  Stable. Noted           Chief Complaint: Sadness and shortness of breath     Hospital Course:   Per initial H&P\"Pt Janis Garcia is a 74 y.o. male who presented to [facility] on 06/02/23 reporting that he was feeling tired over the past day. He has a past medical history of hypertension/hyperlipidemia/obesity. Per physician at [facility], patient initially came in hemodynamically stable however started to develop mild hypoxia (low 90s on 2 L nasal cannula) and tachycardia. Despite a low Wells PE score, there was a high suspicion of PE.  CTA chest was ordered demonstrating filling defects in the right and left pulmonary arteries consistent with acute pulmonary emboli. Upon arrival to the [facility] ICU, patient was on room air with an oxygen saturation of 97%. He was afebrile. Patient did have a heart rate 111 bpm, respiratory rate of around 24 to 25 breaths/min, and blood pressure of 148/112 mmHg. He denied any recent prolonged sedentary periods. He denied recent travels. Patient denied smell or family history of thrombosis however did mention that his wife had a DVT in her ankle. Denies personal history of malignancy. Patient did report that he had COVID-19 back in 2020 however since then he has denied any viral illnesses or upper respiratory tract infections.   Denies

## 2023-06-09 ENCOUNTER — OFFICE VISIT (OUTPATIENT)
Dept: FAMILY MEDICINE CLINIC | Age: 73
End: 2023-06-09

## 2023-06-09 VITALS
SYSTOLIC BLOOD PRESSURE: 134 MMHG | TEMPERATURE: 97.8 F | OXYGEN SATURATION: 98 % | DIASTOLIC BLOOD PRESSURE: 80 MMHG | HEIGHT: 72 IN | HEART RATE: 87 BPM | RESPIRATION RATE: 18 BRPM | BODY MASS INDEX: 30.34 KG/M2 | WEIGHT: 224 LBS

## 2023-06-09 DIAGNOSIS — H93.13 TINNITUS OF BOTH EARS: ICD-10-CM

## 2023-06-09 DIAGNOSIS — I26.99 PULMONARY EMBOLISM, BILATERAL (HCC): Primary | ICD-10-CM

## 2023-06-09 DIAGNOSIS — F41.9 ANXIETY: ICD-10-CM

## 2023-06-09 DIAGNOSIS — F43.21 GRIEF: ICD-10-CM

## 2023-06-09 DIAGNOSIS — I10 ESSENTIAL HYPERTENSION: ICD-10-CM

## 2023-06-09 DIAGNOSIS — R73.9 HYPERGLYCEMIA: ICD-10-CM

## 2023-06-09 DIAGNOSIS — R73.03 PREDIABETES: ICD-10-CM

## 2023-06-09 DIAGNOSIS — F51.01 PRIMARY INSOMNIA: ICD-10-CM

## 2023-06-09 DIAGNOSIS — E78.00 PURE HYPERCHOLESTEROLEMIA: ICD-10-CM

## 2023-06-09 DIAGNOSIS — F32.A DEPRESSION, UNSPECIFIED DEPRESSION TYPE: Chronic | ICD-10-CM

## 2023-06-09 LAB — PAI1 AG PPP IA-ACNC: NORMAL

## 2023-06-10 NOTE — PROGRESS NOTES
Physician Progress Note      PATIENT:               Gay Barragan  CSN #:                  [id number]  :                       1950  ADMIT DATE:       2023 2:00 PM  [address] [place] DATE:        2023 6:53 PM  RESPONDING  PROVIDER #:        Kala Ungre TEXT:    Pt admitted with acute PE and has RV strain documented. If possible, please   document in progress notes and discharge summary if you are evaluating and/or   treating any of the following: The medical record reflects the following:  Risk Factors: Acute PE  Clinical Indicators: bilateral PE with RV strain documented; CTA chest shows   filling defects in bilateral pulmonary arteries  Treatment: Labs, imaging, ECHO, monitoring  Options provided:  -- Acute pulmonary embolism with cor pulmonale  -- Acute pulmonary embolism without cor pulmonale  -- Other - I will add my own diagnosis  -- Disagree - Not applicable / Not valid  -- Disagree - Clinically unable to determine / Unknown  -- Refer to Clinical Documentation Reviewer    PROVIDER RESPONSE TEXT:    This patient has an acute pulmonary embolism without cor pulmonale.     Query created by: Raghavendra Piper on 2023 1:27 PM      Electronically signed by:  Yunior Pena 2023 10:43 PM

## 2023-06-20 ENCOUNTER — OFFICE VISIT (OUTPATIENT)
Dept: ONCOLOGY | Age: 73
End: 2023-06-20
Payer: MEDICARE

## 2023-06-20 ENCOUNTER — HOSPITAL ENCOUNTER (OUTPATIENT)
Dept: INFUSION THERAPY | Age: 73
Discharge: HOME | End: 2023-06-20
Payer: MEDICARE

## 2023-06-20 VITALS
HEART RATE: 92 BPM | HEIGHT: 72 IN | TEMPERATURE: 98 F | RESPIRATION RATE: 18 BRPM | DIASTOLIC BLOOD PRESSURE: 91 MMHG | OXYGEN SATURATION: 98 % | SYSTOLIC BLOOD PRESSURE: 152 MMHG | BODY MASS INDEX: 30.72 KG/M2 | WEIGHT: 226.8 LBS

## 2023-06-20 VITALS
HEART RATE: 92 BPM | DIASTOLIC BLOOD PRESSURE: 91 MMHG | TEMPERATURE: 98 F | RESPIRATION RATE: 18 BRPM | OXYGEN SATURATION: 98 % | SYSTOLIC BLOOD PRESSURE: 152 MMHG

## 2023-06-20 DIAGNOSIS — D68.59 HYPERCOAGULABLE STATE (HCC): ICD-10-CM

## 2023-06-20 DIAGNOSIS — D68.59 HYPERCOAGULABLE STATE (HCC): Primary | ICD-10-CM

## 2023-06-20 PROCEDURE — 1124F ACP DISCUSS-NO DSCNMKR DOCD: CPT | Performed by: NURSE PRACTITIONER

## 2023-06-20 PROCEDURE — 99211 OFF/OP EST MAY X REQ PHY/QHP: CPT

## 2023-06-20 PROCEDURE — 3077F SYST BP >= 140 MM HG: CPT | Performed by: NURSE PRACTITIONER

## 2023-06-20 PROCEDURE — 85305 CLOT INHIBIT PROT S TOTAL: CPT

## 2023-06-20 PROCEDURE — 3080F DIAST BP >= 90 MM HG: CPT | Performed by: NURSE PRACTITIONER

## 2023-06-20 PROCEDURE — 99204 OFFICE O/P NEW MOD 45 MIN: CPT | Performed by: NURSE PRACTITIONER

## 2023-06-20 PROCEDURE — 85300 ANTITHROMBIN III ACTIVITY: CPT

## 2023-06-20 PROCEDURE — 81241 F5 GENE: CPT

## 2023-06-20 PROCEDURE — 85613 RUSSELL VIPER VENOM DILUTED: CPT

## 2023-06-20 PROCEDURE — 85302 CLOT INHIBIT PROT C ANTIGEN: CPT

## 2023-06-20 PROCEDURE — 36415 COLL VENOUS BLD VENIPUNCTURE: CPT

## 2023-06-20 NOTE — PROGRESS NOTES
[facility] [address] [facility], [address] [facility]  [address] [address]  Dept: [phone]  Dept Fax: [phone]: [phone]      Visit Date:6/20/2023     Jan Molina is a 74 y.o. male who presents today for:   Chief Complaint   Patient presents with    New Patient     Pulmonary embolism bilateral         HPI:   Jan Molina is a 74 y.o. male referred to our office by Dr. Lawyer Rodrigues for evaluation of PE.  PMH includes HTN, obesity, BPH, prediabetes, DDD, hyperlipidemia, diverticulosis, depression, anxiety, PE. Pt presented to ED on 6/2/2023 with complaints of fatigue, mild hypoxia and tachycardia. Pt reports he took his dog out and was SOB. Before this episode, he was walking 2 miles per day. CTA chest (+) filling defects in right left pulmonary arteries consistent with acute PE; associated RV strain. He had denied recent prolonged sedentary periods or travel. He was placed on heparin gtt and monitored with consideration of mechanical thrombectomy. He improved and was switched to lovenox, discharged on Xarelto. Partial hypercoag workup completed in hospital as follows:  prothrombin (-), cardiolipin IgG (-), IgM (+) 20, Factor V assay (-), protein C functional (-), protein S functional (-), beta 2 glycoprotein IgG, IgM (-). Cardiolipin IgM will need redrawn 12 weeks after initial draw on 6/3/2023. He denies headaches, dizziness, cough, SOB, CP, heart palpitations, LE redness/edema/warmth/pain, s/s bleeding. He reports being relatively healthy his whole life. He lost his wife 1 year ago. He denies tobacco or alcohol use. PMH, SH, and FH:  I reviewed the patient's medication and allergy lists as noted on the electronic medical record. The PMH, SH, and FH were also reviewed as noted on the EMR.         Past Medical History:   Diagnosis Date    Benign prostatic hyperplasia     Degenerative arthritis of lumbar spine     Depression 6/4/2023    Diverticulosis

## 2023-06-20 NOTE — PATIENT INSTRUCTIONS
Obtain labs   Will call with results.    Return to clinic after 9/3/2023 to see Melida Melara with labs:  cardiolipin

## 2023-06-21 ENCOUNTER — CARE COORDINATION (OUTPATIENT)
Dept: CASE MANAGEMENT | Age: 73
End: 2023-06-21

## 2023-06-21 NOTE — CARE COORDINATION
[facility] Care Transitions Follow Up Call    Patient Current Location:  Home: [address] [place] [address]    Care Transition Nurse contacted the patient by telephone to follow up after admission on 23. Verified name and  with patient as identifiers. Patient: Juliette Belle  Patient : 1950   MRN: [id number]  Reason for Admission: Bilateral PE  Discharge Date: 23 RARS: Readmission Risk Score: 6.5      Needs to be reviewed by the provider   Additional needs identified to be addressed with provider: No  none             Method of communication with provider: none. Spoke with Sophia Kp, said he is feeling pretty good. Denies fever, SOB/TEE, chest pain. Saw oncologist yesterday, said would need to be on Xarelto for 6 months. She had some lab work done, still in process. Eating, drinking good. Is sleeping better. Denies any other needs. No other questions or concerns at this time. Will continue to follow. Addressed changes since last contact:  none  Discussed follow-up appointments. Follow Up  Future Appointments   Date Time Provider Shanell Juarez   2023 10:20 AM STEPHANIE Mullins - CNP [facility] [place]   10/2/2023  3:20 PM Zee Conroy DO [address] [facility] follow up appointment(s): freddy    Care Transition Nurse reviewed medical action plan and red flags with patient and discussed any barriers to care and/or understanding of plan of care after discharge. Discussed appropriate site of care based on symptoms and resources available to patient including: PCP  Specialist  Urgent care clinics  When to call 911. The patient agrees to contact the PCP office for questions related to their healthcare. Advance Care Planning:   not on file.      Patients top risk factors for readmission: lack of knowledge about disease and medical condition-HTN, PE  Interventions to address risk factors: Scheduled appointment with PCP-10/2 and Scheduled

## 2023-06-22 LAB
AT III ACT/NOR PPP CHRO: 96 % (ref 76–128)
MISC. #1 REFERENCE GROUP TEST: ABNORMAL
PROT C AG ACT/NOR PPP IA: 86 % (ref 63–153)
PROT S AG ACT/NOR PPP IA: 120 % (ref 84–134)

## 2023-06-23 LAB — F5 P.R506Q BLD/T QL: NEGATIVE

## 2023-06-28 ENCOUNTER — CARE COORDINATION (OUTPATIENT)
Dept: CASE MANAGEMENT | Age: 73
End: 2023-06-28

## 2023-06-30 ENCOUNTER — CARE COORDINATION (OUTPATIENT)
Dept: CASE MANAGEMENT | Age: 73
End: 2023-06-30

## 2023-08-28 DIAGNOSIS — D68.59 HYPERCOAGULABLE STATE (HCC): Primary | ICD-10-CM

## 2023-08-29 ENCOUNTER — OFFICE VISIT (OUTPATIENT)
Dept: FAMILY MEDICINE CLINIC | Age: 73
End: 2023-08-29
Payer: MEDICARE

## 2023-08-29 VITALS
BODY MASS INDEX: 33.58 KG/M2 | HEART RATE: 78 BPM | DIASTOLIC BLOOD PRESSURE: 78 MMHG | HEIGHT: 70 IN | RESPIRATION RATE: 16 BRPM | TEMPERATURE: 97.6 F | WEIGHT: 234.6 LBS | SYSTOLIC BLOOD PRESSURE: 130 MMHG | OXYGEN SATURATION: 98 %

## 2023-08-29 DIAGNOSIS — I26.99 PULMONARY EMBOLISM, BILATERAL (HCC): ICD-10-CM

## 2023-08-29 DIAGNOSIS — G47.00 INSOMNIA, UNSPECIFIED TYPE: Primary | ICD-10-CM

## 2023-08-29 PROCEDURE — 99214 OFFICE O/P EST MOD 30 MIN: CPT | Performed by: FAMILY MEDICINE

## 2023-08-29 PROCEDURE — 1124F ACP DISCUSS-NO DSCNMKR DOCD: CPT | Performed by: FAMILY MEDICINE

## 2023-08-29 PROCEDURE — 3075F SYST BP GE 130 - 139MM HG: CPT | Performed by: FAMILY MEDICINE

## 2023-08-29 PROCEDURE — 3078F DIAST BP <80 MM HG: CPT | Performed by: FAMILY MEDICINE

## 2023-08-29 RX ORDER — TRAZODONE HYDROCHLORIDE 50 MG/1
50 TABLET ORAL NIGHTLY
Qty: 30 TABLET | Refills: 2 | Status: SHIPPED | OUTPATIENT
Start: 2023-08-29

## 2023-09-01 NOTE — PROGRESS NOTES
[facility] [address] [place] [address] Thomas [address]a [address]  [address] [facility] [address] [address]  Dept: [phone]  Dept Fax: [phone]: [phone]      Visit Date:9/5/2023     Humaira Mckeon is a 74 y.o. male who presents today for:   Chief Complaint   Patient presents with    Follow-up     Hypercoagulable state [facility])        HPI:   Humaira Mckeon is a 74 y.o. male referred to our office by Dr. Steven Leos for evaluation of PE.  PMH includes HTN, obesity, BPH, prediabetes, DDD, hyperlipidemia, diverticulosis, depression, anxiety, PE. Pt presented to ED on 6/2/2023 with complaints of fatigue, mild hypoxia and tachycardia. Pt reports he took his dog out and was SOB. Before this episode, he was walking 2 miles per day. CTA chest (+) filling defects in right left pulmonary arteries consistent with acute PE; associated RV strain. He had denied recent prolonged sedentary periods or travel. He was placed on heparin gtt and monitored with consideration of mechanical thrombectomy. He improved and was switched to lovenox, discharged on Xarelto. Partial hypercoag workup completed in hospital as follows:  prothrombin (-), cardiolipin IgG (-), IgM (+) 20, Factor V assay (-), protein C functional (-), protein S functional (-), beta 2 glycoprotein IgG, IgM (-). Cardiolipin IgM will need redrawn 12 weeks after initial draw on 6/3/2023. He denies headaches, dizziness, cough, SOB, CP, heart palpitations, LE redness/edema/warmth/pain, s/s bleeding. He reports being relatively healthy his whole life. He lost his wife 1 year ago. He denies tobacco or alcohol use. Interval History 9/5/2023:   The patient presents to the office today for follow up and evaluation of PE, elevated cardiolipin. The patient reports he feels well today. He is back to normal activity after having PE. He continues on Xarelto. He denies headaches, dizziness, cough, SOB, CP, heart palpitations, LE redness/edema/warmth/pain, s/s bleeding.

## 2023-09-05 ENCOUNTER — OFFICE VISIT (OUTPATIENT)
Dept: ONCOLOGY | Age: 73
End: 2023-09-05
Payer: MEDICARE

## 2023-09-05 ENCOUNTER — HOSPITAL ENCOUNTER (OUTPATIENT)
Dept: INFUSION THERAPY | Age: 73
Discharge: HOME | End: 2023-09-05
Payer: MEDICARE

## 2023-09-05 VITALS
HEIGHT: 70 IN | OXYGEN SATURATION: 98 % | BODY MASS INDEX: 33.21 KG/M2 | WEIGHT: 232 LBS | HEART RATE: 97 BPM | SYSTOLIC BLOOD PRESSURE: 138 MMHG | RESPIRATION RATE: 16 BRPM | TEMPERATURE: 98.1 F | DIASTOLIC BLOOD PRESSURE: 86 MMHG

## 2023-09-05 VITALS
DIASTOLIC BLOOD PRESSURE: 86 MMHG | SYSTOLIC BLOOD PRESSURE: 138 MMHG | OXYGEN SATURATION: 98 % | TEMPERATURE: 98.1 F | RESPIRATION RATE: 16 BRPM | HEART RATE: 97 BPM

## 2023-09-05 DIAGNOSIS — D68.59 HYPERCOAGULABLE STATE (HCC): ICD-10-CM

## 2023-09-05 DIAGNOSIS — D68.59 HYPERCOAGULABLE STATE (HCC): Primary | ICD-10-CM

## 2023-09-05 PROCEDURE — 99211 OFF/OP EST MAY X REQ PHY/QHP: CPT

## 2023-09-05 PROCEDURE — 86147 CARDIOLIPIN ANTIBODY EA IG: CPT

## 2023-09-05 PROCEDURE — 36415 COLL VENOUS BLD VENIPUNCTURE: CPT

## 2023-09-05 PROCEDURE — 3079F DIAST BP 80-89 MM HG: CPT | Performed by: NURSE PRACTITIONER

## 2023-09-05 PROCEDURE — 1124F ACP DISCUSS-NO DSCNMKR DOCD: CPT | Performed by: NURSE PRACTITIONER

## 2023-09-05 PROCEDURE — 99213 OFFICE O/P EST LOW 20 MIN: CPT | Performed by: NURSE PRACTITIONER

## 2023-09-05 PROCEDURE — 3075F SYST BP GE 130 - 139MM HG: CPT | Performed by: NURSE PRACTITIONER

## 2023-09-07 ENCOUNTER — TELEPHONE (OUTPATIENT)
Dept: FAMILY MEDICINE CLINIC | Age: 73
End: 2023-09-07

## 2023-09-07 DIAGNOSIS — R73.03 PREDIABETES: ICD-10-CM

## 2023-09-07 DIAGNOSIS — R73.9 HYPERGLYCEMIA: ICD-10-CM

## 2023-09-07 DIAGNOSIS — I10 ESSENTIAL HYPERTENSION: Primary | ICD-10-CM

## 2023-09-07 NOTE — TELEPHONE ENCOUNTER
Pt due for fasting labs prior to next apt on 10/2/2023. Please call to have pt complete this. Thanks! ASSESSMENT & PLAN   Diagnosis Orders   1. Essential hypertension  CBC with Auto Differential    Comprehensive Metabolic Panel    Hemoglobin A1C    Lipid Panel    TSH with Reflex    Microalbumin / Creatinine Urine Ratio      2. Prediabetes  CBC with Auto Differential    Comprehensive Metabolic Panel    Hemoglobin A1C    Lipid Panel    TSH with Reflex    Microalbumin / Creatinine Urine Ratio      3.  Hyperglycemia  CBC with Auto Differential    Comprehensive Metabolic Panel    Hemoglobin A1C    Lipid Panel    TSH with Reflex    Microalbumin / Creatinine Urine Ratio        Future Appointments   Date Time Provider [address] [place] [address]   10/2/2023  3:20 PM Rowdy Dsouza[address] Limon

## 2023-09-08 LAB
CARDIOLIPIN IGG SER IA-ACNC: < 10 GPL
CARDIOLIPIN IGM SER IA-ACNC: 23 MPL

## 2023-09-20 ENCOUNTER — NURSE ONLY (OUTPATIENT)
Dept: LAB | Age: 73
End: 2023-09-20

## 2023-09-20 DIAGNOSIS — I10 ESSENTIAL HYPERTENSION: ICD-10-CM

## 2023-09-20 DIAGNOSIS — R73.9 HYPERGLYCEMIA: ICD-10-CM

## 2023-09-20 DIAGNOSIS — R73.03 PREDIABETES: ICD-10-CM

## 2023-09-20 LAB
ALBUMIN SERPL BCG-MCNC: 3.7 G/DL (ref 3.5–5.1)
ALP SERPL-CCNC: 65 U/L (ref 38–126)
ALT SERPL W/O P-5'-P-CCNC: 16 U/L (ref 11–66)
ANION GAP SERPL CALC-SCNC: 11 MEQ/L (ref 8–16)
AST SERPL-CCNC: 17 U/L (ref 5–40)
BASOPHILS ABSOLUTE: 0.1 THOU/MM3 (ref 0–0.1)
BASOPHILS NFR BLD AUTO: 0.9 %
BILIRUB SERPL-MCNC: 1 MG/DL (ref 0.3–1.2)
BUN SERPL-MCNC: 23 MG/DL (ref 7–22)
CALCIUM SERPL-MCNC: 9.1 MG/DL (ref 8.5–10.5)
CHLORIDE SERPL-SCNC: 104 MEQ/L (ref 98–111)
CHOLEST SERPL-MCNC: 180 MG/DL (ref 100–199)
CO2 SERPL-SCNC: 26 MEQ/L (ref 23–33)
CREAT SERPL-MCNC: 0.9 MG/DL (ref 0.4–1.2)
CREAT UR-MCNC: 225.4 MG/DL
DEPRECATED MEAN GLUCOSE BLD GHB EST-ACNC: 126 MG/DL (ref 70–126)
DEPRECATED RDW RBC AUTO: 46.6 FL (ref 35–45)
EOSINOPHIL NFR BLD AUTO: 1.9 %
EOSINOPHILS ABSOLUTE: 0.2 THOU/MM3 (ref 0–0.4)
ERYTHROCYTE [DISTWIDTH] IN BLOOD BY AUTOMATED COUNT: 13.7 % (ref 11.5–14.5)
GFR SERPL CREATININE-BSD FRML MDRD: > 60 ML/MIN/1.73M2
GLUCOSE SERPL-MCNC: 108 MG/DL (ref 70–108)
HBA1C MFR BLD HPLC: 6.2 % (ref 4.4–6.4)
HCT VFR BLD AUTO: 44.6 % (ref 42–52)
HDLC SERPL-MCNC: 50 MG/DL
HGB BLD-MCNC: 14.6 GM/DL (ref 14–18)
IMM GRANULOCYTES # BLD AUTO: 0.03 THOU/MM3 (ref 0–0.07)
IMM GRANULOCYTES NFR BLD AUTO: 0.4 %
LDLC SERPL CALC-MCNC: 110 MG/DL
LYMPHOCYTES ABSOLUTE: 3.3 THOU/MM3 (ref 1–4.8)
LYMPHOCYTES NFR BLD AUTO: 42.2 %
MCH RBC QN AUTO: 30.6 PG (ref 26–33)
MCHC RBC AUTO-ENTMCNC: 32.7 GM/DL (ref 32.2–35.5)
MCV RBC AUTO: 93.5 FL (ref 80–94)
MICROALBUMIN UR-MCNC: < 1.2 MG/DL
MICROALBUMIN/CREAT RATIO PNL UR: 5 MG/G (ref 0–30)
MONOCYTES ABSOLUTE: 1 THOU/MM3 (ref 0.4–1.3)
MONOCYTES NFR BLD AUTO: 12.3 %
NEUTROPHILS NFR BLD AUTO: 42.3 %
NRBC BLD AUTO-RTO: 0 /100 WBC
PLATELET # BLD AUTO: 228 THOU/MM3 (ref 130–400)
PMV BLD AUTO: 11.1 FL (ref 9.4–12.4)
POTASSIUM SERPL-SCNC: 4.3 MEQ/L (ref 3.5–5.2)
PROT SERPL-MCNC: 7 G/DL (ref 6.1–8)
RBC # BLD AUTO: 4.77 MILL/MM3 (ref 4.7–6.1)
SEGMENTED NEUTROPHILS ABSOLUTE COUNT: 3.3 THOU/MM3 (ref 1.8–7.7)
SODIUM SERPL-SCNC: 141 MEQ/L (ref 135–145)
TRIGL SERPL-MCNC: 100 MG/DL (ref 0–199)
TSH SERPL DL<=0.005 MIU/L-ACNC: 0.68 UIU/ML (ref 0.4–4.2)
WBC # BLD AUTO: 7.9 THOU/MM3 (ref 4.8–10.8)

## 2023-09-21 ENCOUNTER — TELEPHONE (OUTPATIENT)
Dept: FAMILY MEDICINE CLINIC | Age: 73
End: 2023-09-21

## 2023-09-21 NOTE — TELEPHONE ENCOUNTER
----- Message from Wendy Smith, DO sent at 9/20/2023  8:21 PM EDT -----  Please let pt know that labs are stable and appropriate. Let me know if questions, thanks!

## 2023-09-21 NOTE — TELEPHONE ENCOUNTER
Called patient and informed of lab results. Patient verbalized understanding. No questions or concerns at this time.

## 2023-09-25 ENCOUNTER — OFFICE VISIT (OUTPATIENT)
Dept: FAMILY MEDICINE CLINIC | Age: 73
End: 2023-09-25
Payer: MEDICARE

## 2023-09-25 VITALS
HEART RATE: 74 BPM | BODY MASS INDEX: 33.21 KG/M2 | OXYGEN SATURATION: 98 % | WEIGHT: 232 LBS | HEIGHT: 70 IN | DIASTOLIC BLOOD PRESSURE: 76 MMHG | RESPIRATION RATE: 18 BRPM | SYSTOLIC BLOOD PRESSURE: 128 MMHG | TEMPERATURE: 97.5 F

## 2023-09-25 DIAGNOSIS — F43.21 GRIEF: ICD-10-CM

## 2023-09-25 DIAGNOSIS — E78.00 PURE HYPERCHOLESTEROLEMIA: ICD-10-CM

## 2023-09-25 DIAGNOSIS — F41.9 ANXIETY: ICD-10-CM

## 2023-09-25 DIAGNOSIS — H93.13 TINNITUS OF BOTH EARS: ICD-10-CM

## 2023-09-25 DIAGNOSIS — F32.5 MAJOR DEPRESSIVE DISORDER WITH SINGLE EPISODE, IN FULL REMISSION (HCC): ICD-10-CM

## 2023-09-25 DIAGNOSIS — L23.7 POISON IVY DERMATITIS: Primary | ICD-10-CM

## 2023-09-25 DIAGNOSIS — G47.00 INSOMNIA, UNSPECIFIED TYPE: ICD-10-CM

## 2023-09-25 DIAGNOSIS — Z23 NEEDS FLU SHOT: ICD-10-CM

## 2023-09-25 DIAGNOSIS — I26.99 PULMONARY EMBOLISM, BILATERAL (HCC): ICD-10-CM

## 2023-09-25 DIAGNOSIS — I10 ESSENTIAL HYPERTENSION: ICD-10-CM

## 2023-09-25 DIAGNOSIS — R73.03 PREDIABETES: ICD-10-CM

## 2023-09-25 PROCEDURE — 3074F SYST BP LT 130 MM HG: CPT | Performed by: FAMILY MEDICINE

## 2023-09-25 PROCEDURE — 99214 OFFICE O/P EST MOD 30 MIN: CPT | Performed by: FAMILY MEDICINE

## 2023-09-25 PROCEDURE — 3078F DIAST BP <80 MM HG: CPT | Performed by: FAMILY MEDICINE

## 2023-09-25 PROCEDURE — G0008 ADMIN INFLUENZA VIRUS VAC: HCPCS | Performed by: FAMILY MEDICINE

## 2023-09-25 PROCEDURE — 1124F ACP DISCUSS-NO DSCNMKR DOCD: CPT | Performed by: FAMILY MEDICINE

## 2023-09-25 PROCEDURE — 90694 VACC AIIV4 NO PRSRV 0.5ML IM: CPT | Performed by: FAMILY MEDICINE

## 2023-09-25 RX ORDER — PREDNISONE 10 MG/1
TABLET ORAL
Qty: 30 TABLET | Refills: 0 | Status: SHIPPED | OUTPATIENT
Start: 2023-09-25 | End: 2023-10-11

## 2023-10-26 ENCOUNTER — TELEPHONE (OUTPATIENT)
Dept: FAMILY MEDICINE CLINIC | Age: 73
End: 2023-10-26

## 2023-10-26 NOTE — TELEPHONE ENCOUNTER
----- Message from Neetu Lundy sent at 10/26/2023  2:49 PM EDT -----  Subject: Referral Request    Reason for referral request? Patient is calling in to request a   colonoscopy, please advise   Provider patient wants to be referred to(if known): Marleny Mcclain    Provider Phone Number(if known):     Additional Information for Provider?   ---------------------------------------------------------------------------  --------------  Leonardo [place]    [phone]; OK to leave message on voicemail  ---------------------------------------------------------------------------  --------------

## 2023-10-26 NOTE — TELEPHONE ENCOUNTER
I spoke with the patient and he stated that he has already spoken with Dr Mays Lights office yesterday regarding the colonoscopy. Patient states that he wanted to know about his xarelto.  Patient states that DR Valentino Ina was supposed to stop the xarelto in December and winders if he should just wait until December to schedule the colonoscopy after the xarelto has been stopped     Please advise

## 2023-10-26 NOTE — TELEPHONE ENCOUNTER
Yes  I'd wait until we're done with Xarelto to schedule the colo, unless Sheik feel's it's critical it be done prior

## 2023-11-29 DIAGNOSIS — F51.01 PRIMARY INSOMNIA: Chronic | ICD-10-CM

## 2023-11-29 RX ORDER — AMITRIPTYLINE HYDROCHLORIDE 25 MG/1
25-50 TABLET, FILM COATED ORAL NIGHTLY
Qty: 60 TABLET | Refills: 3 | OUTPATIENT
Start: 2023-11-29

## 2023-11-29 NOTE — TELEPHONE ENCOUNTER
Recent Visits  Date Type Provider Dept   09/25/23 Office Visit Pat Augustine, DO [facility]   08/29/23 Office Visit Pat Augustine, DO [facility]   06/09/23 Office Visit Pat Augustine, DO [facility]   03/29/23 Office Visit Pat Augustine, DO [facility]   09/14/22 Office Visit Pat Augustine, DO [facility]   07/06/22 Office Visit Pat Augustine, DO [facility]   06/08/22 Office Visit Pat Augustine, DO [facility]   Showing recent visits within past 540 days with a meds authorizing provider and meeting all other requirements  Future Appointments  Date Type Provider Dept   04/01/24 Appointment Pat Augustine, DO [facility]   Showing future appointments within next 150 days with a meds authorizing provider and meeting all other requirements

## 2023-12-06 ENCOUNTER — TELEPHONE (OUTPATIENT)
Dept: FAMILY MEDICINE CLINIC | Age: 73
End: 2023-12-06

## 2023-12-06 NOTE — TELEPHONE ENCOUNTER
Accidental phone encounter, disregard As per mother at bedside, pt with no BM for past 3 days. Pt given enema in ED and had BM. MD to DC pt.

## 2023-12-07 ENCOUNTER — TELEPHONE (OUTPATIENT)
Dept: FAMILY MEDICINE CLINIC | Age: 73
End: 2023-12-07

## 2023-12-07 DIAGNOSIS — F51.01 PRIMARY INSOMNIA: Primary | Chronic | ICD-10-CM

## 2023-12-07 RX ORDER — AMITRIPTYLINE HYDROCHLORIDE 25 MG/1
25-50 TABLET, FILM COATED ORAL NIGHTLY
Qty: 180 TABLET | Refills: 3 | Status: SHIPPED | OUTPATIENT
Start: 2023-12-07

## 2023-12-07 NOTE — TELEPHONE ENCOUNTER
That's fine  Just needed him to let me know. Rx sent. Diagnosis Orders   1.  Primary insomnia  amitriptyline (ELAVIL) 25 MG tablet        Medications Discontinued During This Encounter   Medication Reason    traZODone (DESYREL) 50 MG tablet Patient Choice

## 2023-12-07 NOTE — TELEPHONE ENCOUNTER
Pt called asking about the refill for the amitriptyline. I informed pt this was not refilled due to pt's medication being changed to Trazodone in August. Pt states the trazodone did not work for him so he went back on the amitriptyline, taking 2 tabs at night, one at 9/9:30 pm and then another one at 2/2:30 am. Pt states  stated if the trazodone didn't work he could go back on the amitriptyline.     Please advise      [address] [place] [address]

## 2023-12-09 ENCOUNTER — HOSPITAL ENCOUNTER (EMERGENCY)
Age: 73
Discharge: HOME | End: 2023-12-09
Attending: FAMILY MEDICINE
Payer: MEDICARE

## 2023-12-09 VITALS
DIASTOLIC BLOOD PRESSURE: 76 MMHG | HEIGHT: 71 IN | SYSTOLIC BLOOD PRESSURE: 117 MMHG | HEART RATE: 114 BPM | TEMPERATURE: 98.6 F | RESPIRATION RATE: 17 BRPM | WEIGHT: 230 LBS | OXYGEN SATURATION: 97 % | BODY MASS INDEX: 32.2 KG/M2

## 2023-12-09 DIAGNOSIS — U07.1 COVID-19: Primary | ICD-10-CM

## 2023-12-09 LAB
FLUAV AG SPEC QL: NEGATIVE
FLUBV AG SPEC QL: NEGATIVE
SARS-COV-2 RDRP RESP QL NAA+PROBE: DETECTED

## 2023-12-09 PROCEDURE — 87804 INFLUENZA ASSAY W/OPTIC: CPT

## 2023-12-09 PROCEDURE — 87635 SARS-COV-2 COVID-19 AMP PRB: CPT

## 2023-12-09 PROCEDURE — 99283 EMERGENCY DEPT VISIT LOW MDM: CPT

## 2023-12-09 ASSESSMENT — ENCOUNTER SYMPTOMS
VOMITING: 0
SORE THROAT: 0
COUGH: 1
NAUSEA: 0

## 2023-12-09 ASSESSMENT — PAIN - FUNCTIONAL ASSESSMENT: PAIN_FUNCTIONAL_ASSESSMENT: NONE - DENIES PAIN

## 2023-12-10 NOTE — ED NOTES
Pt presents to the front window with complaints of cough and congestion since yesterday. His niece told him to come in and get checked.       Donna Bella RN  12/09/23 1918

## 2023-12-10 NOTE — ED NOTES
Reviewed discharge instructions with patient. He verbalizes understanding. All needs addressed and questions answered before patient discharged.       Abe Salas RN  12/09/23 2008

## 2023-12-10 NOTE — ED PROVIDER NOTES
[address]  eMERGENCY dEPARTMENT eNCOUnter          [address]       Chief Complaint   Patient presents with    Cough    Nasal Congestion       Nurses Notes reviewed and I agree except as noted in the HPI. HISTORY OF PRESENT ILLNESS    Kristen Polanco is a 74 y.o. male who presents cough and nasal congestion. Duration of symptoms two days. No fever. No shortness of breath. REVIEW OF SYSTEMS     Review of Systems   Constitutional:  Negative for chills and fever. HENT:  Positive for congestion. Negative for sore throat. Respiratory:  Positive for cough. Cardiovascular:  Negative for chest pain and palpitations. Gastrointestinal:  Negative for nausea and vomiting. Musculoskeletal:  Positive for myalgias. Negative for arthralgias. Skin:  Negative for pallor and rash. All other systems reviewed and are negative. PAST MEDICAL HISTORY    has a past medical history of Benign prostatic hyperplasia, Degenerative arthritis of lumbar spine, Depression, Diverticulosis, Hyperlipidemia, Hypertension, Insomnia, Obesity, Prediabetes, and Tinnitus. SURGICAL HISTORY      has a past surgical history that includes Cholecystectomy, laparoscopic (2005).     CURRENT MEDICATIONS       Discharge Medication List as of 12/9/2023  7:50 PM        CONTINUE these medications which have NOT CHANGED    Details   amitriptyline (ELAVIL) 25 MG tablet Take 1-2 tablets by mouth nightly, Disp-180 tablet, R-3Normal      rivaroxaban (XARELTO) 20 MG TABS tablet Take 1 tablet by mouth daily (with breakfast) Start on 7/4 after completion of the Xarelto starter pack, Disp-90 tablet, R-1Normal      melatonin (RA MELATONIN) 3 MG TABS tablet Take 1 tablet by mouth nightly, Disp-30 tablet, R-2Normal      lisinopril-hydroCHLOROthiazide (PRINZIDE;ZESTORETIC) 10-12.5 MG per tablet Take 1 tablet by mouth once daily, Disp-90 tablet, R-0Normal      Ascorbic Acid (VITAMIN C) 250 MG tablet Take 1 tablet by

## 2023-12-11 ENCOUNTER — TELEPHONE (OUTPATIENT)
Dept: ONCOLOGY | Age: 73
End: 2023-12-11

## 2023-12-11 ENCOUNTER — CARE COORDINATION (OUTPATIENT)
Dept: CARE COORDINATION | Age: 73
End: 2023-12-11

## 2023-12-11 NOTE — CARE COORDINATION
Patient contacted regarding COVID-19 diagnosis. Discussed COVID-19 related testing which was available at this time. Test results were positive. Patient informed of results, if available? Yes. Ambulatory Care Manager contacted the patient by telephone to perform post discharge assessment. Call within 2 business days of discharge: Yes. Verified name and  with patient as identifiers. Provided introduction to self, and explanation of the CTN/ACM role, and reason for call due to risk factors for infection and/or exposure to COVID-19. Symptoms reviewed with patient who verbalized the following symptoms: cough  no new symptoms  no worsening symptoms  Congestion-states mild sx's . Due to no new or worsening symptoms encounter was not routed to provider for escalation. Discussed follow-up appointments. If no appointment was previously scheduled, appointment scheduling offered: encouraged to call with any changes in sx's. Sx's are very mild. [facility] follow up appointment(s):   Future Appointments   Date Time Provider [address]   2024  1:00 PM [address] [facility]     Non-[facility] follow up appointment(s): encouraged to call office for follow up. No appt scheduled due to very mild sx's    Non-face-to-face services provided:  Scheduled appointment with PCP-encouraged to call with any changes in sx's or worsening of sx's  Obtained and reviewed discharge summary and/or continuity of care documents     Advance Care Planning:   Does patient have an Advance Directive:   not able to discuss during call . Educated patient about risk for severe COVID-19 due to risk factors according to CDC guidelines. ACM reviewed discharge instructions, medical action plan and red flag symptoms with the patient who verbalized understanding. Discussed COVID vaccination status: No. Education provided on COVID-19 vaccination as appropriate.  Discussed exposure protocols and quarantine with CDC

## 2023-12-12 ENCOUNTER — TELEPHONE (OUTPATIENT)
Dept: FAMILY MEDICINE CLINIC | Age: 73
End: 2023-12-12

## 2023-12-12 NOTE — TELEPHONE ENCOUNTER
Yes, ok to stop    Medications Discontinued During This Encounter   Medication Reason    rivaroxaban (XARELTO) 20 MG TABS tablet Therapy completed

## 2024-02-28 ENCOUNTER — OFFICE VISIT (OUTPATIENT)
Dept: FAMILY MEDICINE CLINIC | Age: 74
End: 2024-02-28
Payer: MEDICARE

## 2024-02-28 VITALS
SYSTOLIC BLOOD PRESSURE: 134 MMHG | OXYGEN SATURATION: 98 % | BODY MASS INDEX: 35 KG/M2 | DIASTOLIC BLOOD PRESSURE: 82 MMHG | HEART RATE: 78 BPM | HEIGHT: 71 IN | WEIGHT: 250 LBS | TEMPERATURE: 97.9 F | RESPIRATION RATE: 18 BRPM

## 2024-02-28 DIAGNOSIS — L30.9 DERMATITIS: Primary | ICD-10-CM

## 2024-02-28 PROCEDURE — 1124F ACP DISCUSS-NO DSCNMKR DOCD: CPT | Performed by: FAMILY MEDICINE

## 2024-02-28 PROCEDURE — 99213 OFFICE O/P EST LOW 20 MIN: CPT | Performed by: FAMILY MEDICINE

## 2024-02-28 PROCEDURE — 3075F SYST BP GE 130 - 139MM HG: CPT | Performed by: FAMILY MEDICINE

## 2024-02-28 PROCEDURE — 3079F DIAST BP 80-89 MM HG: CPT | Performed by: FAMILY MEDICINE

## 2024-02-28 RX ORDER — PREDNISONE 20 MG/1
40 TABLET ORAL DAILY
Qty: 10 TABLET | Refills: 0 | Status: SHIPPED | OUTPATIENT
Start: 2024-02-28 | End: 2024-03-04

## 2024-02-28 ASSESSMENT — PATIENT HEALTH QUESTIONNAIRE - PHQ9
7. TROUBLE CONCENTRATING ON THINGS, SUCH AS READING THE NEWSPAPER OR WATCHING TELEVISION: 0
1. LITTLE INTEREST OR PLEASURE IN DOING THINGS: 0
SUM OF ALL RESPONSES TO PHQ QUESTIONS 1-9: 0
2. FEELING DOWN, DEPRESSED OR HOPELESS: 0
SUM OF ALL RESPONSES TO PHQ QUESTIONS 1-9: 0
SUM OF ALL RESPONSES TO PHQ9 QUESTIONS 1 & 2: 0
3. TROUBLE FALLING OR STAYING ASLEEP: 0
8. MOVING OR SPEAKING SO SLOWLY THAT OTHER PEOPLE COULD HAVE NOTICED. OR THE OPPOSITE, BEING SO FIGETY OR RESTLESS THAT YOU HAVE BEEN MOVING AROUND A LOT MORE THAN USUAL: 0
6. FEELING BAD ABOUT YOURSELF - OR THAT YOU ARE A FAILURE OR HAVE LET YOURSELF OR YOUR FAMILY DOWN: 0
10. IF YOU CHECKED OFF ANY PROBLEMS, HOW DIFFICULT HAVE THESE PROBLEMS MADE IT FOR YOU TO DO YOUR WORK, TAKE CARE OF THINGS AT HOME, OR GET ALONG WITH OTHER PEOPLE: 0
9. THOUGHTS THAT YOU WOULD BE BETTER OFF DEAD, OR OF HURTING YOURSELF: 0
5. POOR APPETITE OR OVEREATING: 0
SUM OF ALL RESPONSES TO PHQ QUESTIONS 1-9: 0
4. FEELING TIRED OR HAVING LITTLE ENERGY: 0
SUM OF ALL RESPONSES TO PHQ QUESTIONS 1-9: 0

## 2024-02-28 NOTE — PROGRESS NOTES
Chief Complaint   Patient presents with    Rash     Left lower leg     History obtained from the patient.    SUBJECTIVE:  Cezar Stahl is a 73 y.o. male that presents today for     -Rash:  L leg  Started a wk ago  Starting to spread  Itchy  Red  No pain  No new meds, soaps or detergents    Inciting events or exposures prior to rash starting? Yes  Pruritic?  Yes  Erythematous?  Yes  Weeping or drainage?  No  History of Urticaria?  No  Fever?  No  Painful?  No  New Detergent?  No      Age/Gender Health Maintenance    Lipid -     Lab Results   Component Value Date    CHOL 180 09/20/2023    CHOL 188 09/26/2022    CHOL 185 10/25/2021     Lab Results   Component Value Date    TRIG 100 09/20/2023    TRIG 70 09/26/2022    TRIG 134 10/25/2021     Lab Results   Component Value Date    HDL 50 09/20/2023    HDL 58 09/26/2022    HDL 49 10/25/2021     Lab Results   Component Value Date    LDLCALC 110 09/20/2023    LDLCALC 116 09/26/2022    LDLCALC 109 10/25/2021       DM Screen -   Lab Results   Component Value Date/Time    GLUCOSE 108 09/20/2023 09:39 AM    GLUCOSE 97 11/02/2019 06:30 AM     Lab Results   Component Value Date/Time    LABA1C 6.2 09/20/2023 09:39 AM    LABA1C 6.3 06/03/2023 07:09 AM    LABA1C 6.3 04/07/2023 09:34 AM    LABA1C 5.9 09/26/2022 10:50 AM    LABA1C 6.0 02/10/2022 11:34 AM    LABA1C 7.0 10/25/2021 02:51 PM       Colon Cancer Screening - + multiple polyps SEPT 2018, repeat 5 years  Lung Cancer Screening - never smoker    Tetanus - to get at pharmacy per medicare rules  Influenza Vaccine - UTD FALL 2023  Pneumonia Vaccine - UTD X2 both PPV 23 and PCV 13  Zostavax - UTD 2014  Shingrix - to get at pharmacy per medicare rules    PSA testing discussion - 1.15 MAY 2014  Lab Results   Component Value Date    PSA 1.32 11/02/2019    PSA 1.32 11/03/2018    PSA 1.19 05/05/2018       AAA Screening - never smoker.       Current Outpatient Medications   Medication Sig Dispense Refill    predniSONE (DELTASONE)

## 2024-03-14 ENCOUNTER — TELEPHONE (OUTPATIENT)
Dept: FAMILY MEDICINE CLINIC | Age: 74
End: 2024-03-14

## 2024-03-14 DIAGNOSIS — R73.9 HYPERGLYCEMIA: ICD-10-CM

## 2024-03-14 DIAGNOSIS — R73.03 PREDIABETES: Primary | Chronic | ICD-10-CM

## 2024-03-14 NOTE — TELEPHONE ENCOUNTER
Pt due for fasting labs prior to next apt on 4/1/2024. Please call to have pt complete this. Thanks!    ASSESSMENT & PLAN   Diagnosis Orders   1. Prediabetes  Glucose, Random    Hemoglobin A1C      2. Hyperglycemia  Glucose, Random    Hemoglobin A1C        Future Appointments   Date Time Provider Department Center   4/1/2024  1:00 PM Chaparro Jhaveri, DO [facility] [place]

## 2024-03-31 PROBLEM — Z86.711 HISTORY OF PULMONARY EMBOLISM: Status: ACTIVE | Noted: 2024-03-31

## 2024-03-31 PROBLEM — Z86.711 HISTORY OF PULMONARY EMBOLISM: Chronic | Status: ACTIVE | Noted: 2023-06-02

## 2024-03-31 PROBLEM — Z86.711 HISTORY OF PULMONARY EMBOLISM: Status: ACTIVE | Noted: 2023-06-02

## 2024-03-31 NOTE — PATIENT INSTRUCTIONS
review.    Other Preventive Recommendations:    A preventive eye exam performed by an eye specialist is recommended every 1-2 years to screen for glaucoma; cataracts, macular degeneration, and other eye disorders.  A preventive dental visit is recommended every 6 months.  Try to get at least 150 minutes of exercise per week or 10,000 steps per day on a pedometer .  Order or download the FREE \"Exercise & Physical Activity: Your Everyday Guide\" from [employer] [place] [employer]. Call 1-968.300.6996 or search [employer] [place] [employer] online.  You need 3127-2727 mg of calcium and 1439-2764 IU of vitamin D per day. It is possible to meet your calcium requirement with diet alone, but a vitamin D supplement is usually necessary to meet this goal.  When exposed to the sun, use a sunscreen that protects against both UVA and UVB radiation with an SPF of 30 or greater. Reapply every 2 to 3 hours or after sweating, drying off with a towel, or swimming.  Always wear a seat belt when traveling in a car. Always wear a helmet when riding a bicycle or motorcycle.

## 2024-03-31 NOTE — PROGRESS NOTES
Chief Complaint   Patient presents with    Medicare AWV     History obtained from the patient.    SUBJECTIVE:  Cezar Stahl is a 73 y.o. male that presents today for     -Insomnia:  Pt had been on Elavil for a while  Wasn't working as well, so changed to Trazodone  In the last 6 months, pt asked to changed back from Trazodone to Elavil  Since then sleeping better with Elavil than Trazodone, would like to continue with this.       -Depression/Anxiety/Grief:  Was having a lot of anxiety and grief after wife  in   Was placed on Buspar, has since weaned off  Anxiety better, not taking buspar any more  No SI/hI         -PRIOR VISIT:  Patient admitted to [facility] from  to 2023 for issues noted below.   D/c summary:  None available at time of my evaluation, sadly.     -Last hospitalist progress note 2023:  \"Assessment/Plan:     Bilateral pulmonary emboli with RV strain  CTA Chest : filling defects in bilateral pulmonary arteries and evidence of RV strain. No personal or family history of thrombosis or thromboembolic disease. Denies h/o recent travel or sedentary lifestyle. Denies personal h/o malignancy. Denies h/o liver disease. Pt also has evidence of chronic granulomatous disease on imaging which may contribute to hypercoagulability  IR consulted ; appreciate recs   Discussed case with Dr. Ariel Singer MD, IR. Recommending continuing heparin gtt and monitoring for hemodynamic instability. Will determine whether pt is appropriate for mechanical thrombectomy vs tPA  -Heparin gtt, switched to Lovenox.  Consider long-term outpatient anticoagulation.   -Hypercoagulable workup initiated:    Prothrombin Gene Mutation, pending     Beta-2 glycoprotein Ab, IgG + IgM, pending     CLAUDIA-2, pending     Cardiolipin antibodies IgG + IgM,pending     MTHFR, pending  + Homocysteine 12.1    Factor V Activity, pending     Protein C + S Activity, pending   -Antithrombin III not ordered as pt on

## 2024-04-01 ENCOUNTER — OFFICE VISIT (OUTPATIENT)
Dept: FAMILY MEDICINE CLINIC | Age: 74
End: 2024-04-01
Payer: MEDICARE

## 2024-04-01 VITALS
OXYGEN SATURATION: 99 % | SYSTOLIC BLOOD PRESSURE: 132 MMHG | WEIGHT: 244 LBS | HEART RATE: 80 BPM | TEMPERATURE: 97.7 F | DIASTOLIC BLOOD PRESSURE: 84 MMHG | RESPIRATION RATE: 18 BRPM | BODY MASS INDEX: 34.16 KG/M2 | HEIGHT: 71 IN

## 2024-04-01 DIAGNOSIS — F43.21 GRIEF: ICD-10-CM

## 2024-04-01 DIAGNOSIS — I10 ESSENTIAL HYPERTENSION: ICD-10-CM

## 2024-04-01 DIAGNOSIS — G47.00 INSOMNIA, UNSPECIFIED TYPE: ICD-10-CM

## 2024-04-01 DIAGNOSIS — R73.03 PREDIABETES: Chronic | ICD-10-CM

## 2024-04-01 DIAGNOSIS — R73.9 HYPERGLYCEMIA: ICD-10-CM

## 2024-04-01 DIAGNOSIS — E78.00 PURE HYPERCHOLESTEROLEMIA: ICD-10-CM

## 2024-04-01 DIAGNOSIS — Z86.711 HISTORY OF PULMONARY EMBOLISM: ICD-10-CM

## 2024-04-01 DIAGNOSIS — H93.13 TINNITUS OF BOTH EARS: ICD-10-CM

## 2024-04-01 DIAGNOSIS — Z00.00 MEDICARE ANNUAL WELLNESS VISIT, SUBSEQUENT: Primary | ICD-10-CM

## 2024-04-01 DIAGNOSIS — F41.9 ANXIETY: ICD-10-CM

## 2024-04-01 DIAGNOSIS — F32.5 MAJOR DEPRESSIVE DISORDER WITH SINGLE EPISODE, IN FULL REMISSION (HCC): ICD-10-CM

## 2024-04-01 PROCEDURE — 3075F SYST BP GE 130 - 139MM HG: CPT | Performed by: FAMILY MEDICINE

## 2024-04-01 PROCEDURE — G0439 PPPS, SUBSEQ VISIT: HCPCS | Performed by: FAMILY MEDICINE

## 2024-04-01 PROCEDURE — 1124F ACP DISCUSS-NO DSCNMKR DOCD: CPT | Performed by: FAMILY MEDICINE

## 2024-04-01 PROCEDURE — 3079F DIAST BP 80-89 MM HG: CPT | Performed by: FAMILY MEDICINE

## 2024-04-01 SDOH — ECONOMIC STABILITY: FOOD INSECURITY: WITHIN THE PAST 12 MONTHS, THE FOOD YOU BOUGHT JUST DIDN'T LAST AND YOU DIDN'T HAVE MONEY TO GET MORE.: NEVER TRUE

## 2024-04-01 SDOH — ECONOMIC STABILITY: INCOME INSECURITY: HOW HARD IS IT FOR YOU TO PAY FOR THE VERY BASICS LIKE FOOD, HOUSING, MEDICAL CARE, AND HEATING?: NOT HARD AT ALL

## 2024-04-01 SDOH — ECONOMIC STABILITY: FOOD INSECURITY: WITHIN THE PAST 12 MONTHS, YOU WORRIED THAT YOUR FOOD WOULD RUN OUT BEFORE YOU GOT MONEY TO BUY MORE.: NEVER TRUE

## 2024-04-01 ASSESSMENT — PATIENT HEALTH QUESTIONNAIRE - PHQ9
2. FEELING DOWN, DEPRESSED OR HOPELESS: NOT AT ALL
7. TROUBLE CONCENTRATING ON THINGS, SUCH AS READING THE NEWSPAPER OR WATCHING TELEVISION: NOT AT ALL
SUM OF ALL RESPONSES TO PHQ9 QUESTIONS 1 & 2: 0
4. FEELING TIRED OR HAVING LITTLE ENERGY: NOT AT ALL
10. IF YOU CHECKED OFF ANY PROBLEMS, HOW DIFFICULT HAVE THESE PROBLEMS MADE IT FOR YOU TO DO YOUR WORK, TAKE CARE OF THINGS AT HOME, OR GET ALONG WITH OTHER PEOPLE: NOT DIFFICULT AT ALL
3. TROUBLE FALLING OR STAYING ASLEEP: NOT AT ALL
SUM OF ALL RESPONSES TO PHQ QUESTIONS 1-9: 0
SUM OF ALL RESPONSES TO PHQ QUESTIONS 1-9: 0
6. FEELING BAD ABOUT YOURSELF - OR THAT YOU ARE A FAILURE OR HAVE LET YOURSELF OR YOUR FAMILY DOWN: NOT AT ALL
8. MOVING OR SPEAKING SO SLOWLY THAT OTHER PEOPLE COULD HAVE NOTICED. OR THE OPPOSITE, BEING SO FIGETY OR RESTLESS THAT YOU HAVE BEEN MOVING AROUND A LOT MORE THAN USUAL: NOT AT ALL
1. LITTLE INTEREST OR PLEASURE IN DOING THINGS: NOT AT ALL
SUM OF ALL RESPONSES TO PHQ QUESTIONS 1-9: 0
5. POOR APPETITE OR OVEREATING: NOT AT ALL
9. THOUGHTS THAT YOU WOULD BE BETTER OFF DEAD, OR OF HURTING YOURSELF: NOT AT ALL
SUM OF ALL RESPONSES TO PHQ QUESTIONS 1-9: 0

## 2024-04-03 ENCOUNTER — NURSE ONLY (OUTPATIENT)
Dept: LAB | Age: 74
End: 2024-04-03

## 2024-04-03 DIAGNOSIS — R73.9 HYPERGLYCEMIA: ICD-10-CM

## 2024-04-03 DIAGNOSIS — R73.03 PREDIABETES: Chronic | ICD-10-CM

## 2024-04-03 LAB
DEPRECATED MEAN GLUCOSE BLD GHB EST-ACNC: 135 MG/DL (ref 70–126)
GLUCOSE SERPL-MCNC: 109 MG/DL (ref 70–108)
HBA1C MFR BLD HPLC: 6.5 % (ref 4.4–6.4)

## 2024-04-04 ENCOUNTER — TELEPHONE (OUTPATIENT)
Dept: FAMILY MEDICINE CLINIC | Age: 74
End: 2024-04-04

## 2024-04-04 NOTE — TELEPHONE ENCOUNTER
----- Message from Chaparro Jhaveri, DO sent at 4/3/2024  6:58 PM EDT -----  Please let pt know that A1c slightly worse at 6.5  Work on lower carb diet and a little wt loss  Will plan to repeat labs again in 6 months prior to next apt  Let me know if questions, thanks!

## 2024-04-11 ENCOUNTER — OFFICE VISIT (OUTPATIENT)
Dept: FAMILY MEDICINE CLINIC | Age: 74
End: 2024-04-11
Payer: MEDICARE

## 2024-04-11 VITALS
RESPIRATION RATE: 18 BRPM | BODY MASS INDEX: 34.02 KG/M2 | HEIGHT: 71 IN | SYSTOLIC BLOOD PRESSURE: 136 MMHG | OXYGEN SATURATION: 98 % | TEMPERATURE: 97.9 F | DIASTOLIC BLOOD PRESSURE: 80 MMHG | HEART RATE: 76 BPM | WEIGHT: 243 LBS

## 2024-04-11 DIAGNOSIS — L30.9 DERMATITIS: Primary | ICD-10-CM

## 2024-04-11 PROCEDURE — 3075F SYST BP GE 130 - 139MM HG: CPT | Performed by: FAMILY MEDICINE

## 2024-04-11 PROCEDURE — 1124F ACP DISCUSS-NO DSCNMKR DOCD: CPT | Performed by: FAMILY MEDICINE

## 2024-04-11 PROCEDURE — 99213 OFFICE O/P EST LOW 20 MIN: CPT | Performed by: FAMILY MEDICINE

## 2024-04-11 PROCEDURE — 3079F DIAST BP 80-89 MM HG: CPT | Performed by: FAMILY MEDICINE

## 2024-04-11 RX ORDER — PREDNISONE 20 MG/1
40 TABLET ORAL DAILY
Qty: 10 TABLET | Refills: 0 | Status: SHIPPED | OUTPATIENT
Start: 2024-04-11 | End: 2024-04-16

## 2024-04-11 NOTE — PROGRESS NOTES
Chief Complaint   Patient presents with    Rash     History obtained from the patient.    SUBJECTIVE:  Cezar Stahl is a 73 y.o. male that presents today for     -Rash:  3 days ago  Bilat LE, lower shins  No new meds, soaps or detergents    Inciting events or exposures prior to rash starting? No  Pruritic?  Yes  Erythematous?  Yes  Weeping or drainage?  No  History of Urticaria?  No  Fever?  No  Painful?  No  New Detergent?  No      Age/Gender Health Maintenance    Lipid -     Lab Results   Component Value Date    CHOL 180 09/20/2023    CHOL 188 09/26/2022    CHOL 185 10/25/2021     Lab Results   Component Value Date    TRIG 100 09/20/2023    TRIG 70 09/26/2022    TRIG 134 10/25/2021     Lab Results   Component Value Date    HDL 50 09/20/2023    HDL 58 09/26/2022    HDL 49 10/25/2021     Lab Results   Component Value Date    LDLCALC 110 09/20/2023    LDLCALC 116 09/26/2022    LDLCALC 109 10/25/2021       DM Screen -   Lab Results   Component Value Date/Time    GLUCOSE 109 04/03/2024 09:23 AM    GLUCOSE 97 11/02/2019 06:30 AM     Lab Results   Component Value Date/Time    LABA1C 6.5 04/03/2024 09:23 AM    LABA1C 6.2 09/20/2023 09:39 AM    LABA1C 6.3 06/03/2023 07:09 AM    LABA1C 6.3 04/07/2023 09:34 AM    LABA1C 5.9 09/26/2022 10:50 AM    LABA1C 6.0 02/10/2022 11:34 AM       Colon Cancer Screening - 1 polyp JAN 2024, repeat 5 years per [facility].   Lung Cancer Screening - never smoker    Tetanus - to get at pharmacy per medicare rules  Influenza Vaccine - UTD FALL 2023  Pneumonia Vaccine - UTD X2 both PPV 23 and PCV 13  Zostavax - UTD 2014  Shingrix - to get at pharmacy per medicare rules    PSA testing discussion - 1.15 MAY 2014  Lab Results   Component Value Date    PSA 1.32 11/02/2019    PSA 1.32 11/03/2018    PSA 1.19 05/05/2018       AAA Screening - never smoker.       Current Outpatient Medications   Medication Sig Dispense Refill    predniSONE (DELTASONE) 20 MG tablet Take 2 tablets by mouth daily

## 2024-04-23 ENCOUNTER — OFFICE VISIT (OUTPATIENT)
Dept: FAMILY MEDICINE CLINIC | Age: 74
End: 2024-04-23
Payer: MEDICARE

## 2024-04-23 VITALS
BODY MASS INDEX: 33.88 KG/M2 | DIASTOLIC BLOOD PRESSURE: 82 MMHG | HEART RATE: 79 BPM | OXYGEN SATURATION: 98 % | HEIGHT: 71 IN | SYSTOLIC BLOOD PRESSURE: 132 MMHG | RESPIRATION RATE: 18 BRPM | TEMPERATURE: 97.9 F | WEIGHT: 242 LBS

## 2024-04-23 DIAGNOSIS — L30.9 DERMATITIS: Primary | ICD-10-CM

## 2024-04-23 PROCEDURE — 3079F DIAST BP 80-89 MM HG: CPT | Performed by: FAMILY MEDICINE

## 2024-04-23 PROCEDURE — 99213 OFFICE O/P EST LOW 20 MIN: CPT | Performed by: FAMILY MEDICINE

## 2024-04-23 PROCEDURE — 1124F ACP DISCUSS-NO DSCNMKR DOCD: CPT | Performed by: FAMILY MEDICINE

## 2024-04-23 PROCEDURE — 3075F SYST BP GE 130 - 139MM HG: CPT | Performed by: FAMILY MEDICINE

## 2024-04-23 RX ORDER — PREDNISONE 10 MG/1
TABLET ORAL
Qty: 30 TABLET | Refills: 0 | Status: SHIPPED | OUTPATIENT
Start: 2024-04-23 | End: 2024-05-09

## 2024-04-23 RX ORDER — TRIAMCINOLONE ACETONIDE 1 MG/G
CREAM TOPICAL
Qty: 453 G | Refills: 0 | Status: SHIPPED | OUTPATIENT
Start: 2024-04-23

## 2024-04-23 RX ORDER — HYDROXYZINE HYDROCHLORIDE 25 MG/1
25 TABLET, FILM COATED ORAL EVERY 8 HOURS PRN
Qty: 30 TABLET | Refills: 0 | Status: SHIPPED | OUTPATIENT
Start: 2024-04-23 | End: 2024-05-03

## 2024-04-23 NOTE — PROGRESS NOTES
EXAM:  Vitals:    04/23/24 1616   BP: 132/82   Pulse: 79   Resp: 18   Temp: 97.9 °F (36.6 °C)   TempSrc: Temporal   SpO2: 98%   Weight: 109.8 kg (242 lb)   Height: 1.803 m (5' 10.98\")     Body mass index is 33.77 kg/m².       VS Reviewed  General Appearance: A&O x 3, No acute distress,well developed and well- nourished  Eyes: pupils equal, round, and reactive to light, extraocular eye movements intact, conjunctivae and eye lids without erythema  ENT: external ear and ear canal clear bilaterally, TMs intact and regular, nose without deformity, nasal mucosa and turbinates normal without polyps, oropharynx normal, dentition is normal for age  Neck: supple and non-tender without mass, no thyromegaly or thyroid nodules, no cervical lymphadenopathy  Pulmonary/Chest: clear to auscultation bilaterally- no wheezes, rales or rhonchi, normal air movement, no respiratory distress or retractions  Cardiovascular: S1 and S2 auscultated w/ RRR. No murmurs, rubs, clicks, or gallops, distal pulses intact.  Abdomen: soft, non-tender, non-distended, bowl sounds physiologic,  no rebound or guarding, no masses or hernias noted. Liver and spleen without enlargement.   Extremities: no cyanosis, clubbing or edema of the lower extremities.   Skin: warm and dry, no rash or erythema other than erythematous squamo-papular rash on lower shins      ASSESSMENT & PLAN  1. Dermatitis    Pred taper  Kenalog crm  Prn atarax  If not resolved with this, will do punch bx    - hydrOXYzine HCl (ATARAX) 25 MG tablet; Take 1 tablet by mouth every 8 hours as needed for Itching  Dispense: 30 tablet; Refill: 0  - predniSONE (DELTASONE) 10 MG tablet; 4 tabs PO ONCE Daily x 4 days, then 2 tabs PO ONCE daily x 4 days, then 1 TAB PO ONCE daily x 4 days, then 1/2 TAB PO ONCE daily x 4 days  Dispense: 30 tablet; Refill: 0  - triamcinolone (KENALOG) 0.1 % cream; Apply topically 2 times daily for 2 weeks  Dispense: 453 g; Refill: 0      DISPOSITION    Return if

## 2024-07-08 ENCOUNTER — APPOINTMENT (OUTPATIENT)
Dept: GENERAL RADIOLOGY | Age: 74
End: 2024-07-08
Attending: STUDENT IN AN ORGANIZED HEALTH CARE EDUCATION/TRAINING PROGRAM
Payer: MEDICARE

## 2024-07-08 ENCOUNTER — HOSPITAL ENCOUNTER (EMERGENCY)
Age: 74
Discharge: HOME | End: 2024-07-08
Attending: STUDENT IN AN ORGANIZED HEALTH CARE EDUCATION/TRAINING PROGRAM
Payer: MEDICARE

## 2024-07-08 ENCOUNTER — HOSPITAL ENCOUNTER (OUTPATIENT)
Dept: INTERVENTIONAL RADIOLOGY/VASCULAR | Age: 74
Discharge: HOME | End: 2024-07-10
Attending: STUDENT IN AN ORGANIZED HEALTH CARE EDUCATION/TRAINING PROGRAM
Payer: MEDICARE

## 2024-07-08 ENCOUNTER — HOSPITAL ENCOUNTER (EMERGENCY)
Age: 74
Discharge: HOME | End: 2024-07-08
Attending: EMERGENCY MEDICINE
Payer: MEDICARE

## 2024-07-08 VITALS
TEMPERATURE: 98.5 F | BODY MASS INDEX: 30.48 KG/M2 | DIASTOLIC BLOOD PRESSURE: 96 MMHG | RESPIRATION RATE: 22 BRPM | HEIGHT: 72 IN | HEART RATE: 99 BPM | WEIGHT: 225 LBS | SYSTOLIC BLOOD PRESSURE: 147 MMHG | OXYGEN SATURATION: 97 %

## 2024-07-08 VITALS
HEIGHT: 70 IN | WEIGHT: 228 LBS | BODY MASS INDEX: 32.64 KG/M2 | RESPIRATION RATE: 16 BRPM | TEMPERATURE: 98.4 F | OXYGEN SATURATION: 95 % | HEART RATE: 93 BPM | DIASTOLIC BLOOD PRESSURE: 62 MMHG | SYSTOLIC BLOOD PRESSURE: 124 MMHG

## 2024-07-08 DIAGNOSIS — I82.492 DEEP VEIN THROMBOSIS (DVT) OF OTHER VEIN OF LEFT LOWER EXTREMITY, UNSPECIFIED CHRONICITY (HCC): ICD-10-CM

## 2024-07-08 DIAGNOSIS — M79.89 SWELLING OF LIMB: ICD-10-CM

## 2024-07-08 DIAGNOSIS — I82.432 ACUTE DEEP VEIN THROMBOSIS OF LEFT POPLITEAL VEIN (HCC): Primary | ICD-10-CM

## 2024-07-08 DIAGNOSIS — M79.605 LEFT LEG PAIN: ICD-10-CM

## 2024-07-08 DIAGNOSIS — M79.605 LEFT LEG PAIN: Primary | ICD-10-CM

## 2024-07-08 LAB
ALBUMIN SERPL BCG-MCNC: 3.5 G/DL (ref 3.5–5.1)
ALP SERPL-CCNC: 98 U/L (ref 38–126)
ALT SERPL W/O P-5'-P-CCNC: 13 U/L (ref 11–66)
ANION GAP SERPL CALC-SCNC: 14 MEQ/L (ref 8–16)
APTT PPP: 33.5 SECONDS (ref 22–38)
AST SERPL-CCNC: 23 U/L (ref 5–40)
BASOPHILS ABSOLUTE: 0.1 THOU/MM3 (ref 0–0.1)
BASOPHILS NFR BLD AUTO: 0.5 %
BILIRUB SERPL-MCNC: 0.8 MG/DL (ref 0.3–1.2)
BUN SERPL-MCNC: 21 MG/DL (ref 7–22)
CALCIUM SERPL-MCNC: 8.9 MG/DL (ref 8.5–10.5)
CHLORIDE SERPL-SCNC: 102 MEQ/L (ref 98–111)
CO2 SERPL-SCNC: 22 MEQ/L (ref 23–33)
CREAT SERPL-MCNC: 0.9 MG/DL (ref 0.4–1.2)
DEPRECATED RDW RBC AUTO: 45.1 FL (ref 35–45)
ECHO BSA: 2.26 M2
EKG ATRIAL RATE: 92 BPM
EKG P AXIS: 21 DEGREES
EKG P-R INTERVAL: 144 MS
EKG Q-T INTERVAL: 372 MS
EKG QRS DURATION: 90 MS
EKG QTC CALCULATION (BAZETT): 460 MS
EKG R AXIS: -29 DEGREES
EKG T AXIS: 23 DEGREES
EKG VENTRICULAR RATE: 92 BPM
EOSINOPHIL NFR BLD AUTO: 0.8 %
EOSINOPHILS ABSOLUTE: 0.1 THOU/MM3 (ref 0–0.4)
ERYTHROCYTE [DISTWIDTH] IN BLOOD BY AUTOMATED COUNT: 13.1 % (ref 11.5–14.5)
GFR SERPL CREATININE-BSD FRML MDRD: 88 ML/MIN/1.73M2
GLUCOSE SERPL-MCNC: 115 MG/DL (ref 70–108)
HCT VFR BLD AUTO: 42.9 % (ref 42–52)
HGB BLD-MCNC: 14.1 GM/DL (ref 14–18)
IMM GRANULOCYTES # BLD AUTO: 0.08 THOU/MM3 (ref 0–0.07)
IMM GRANULOCYTES NFR BLD AUTO: 0.7 %
INR PPP: 1.21 (ref 0.85–1.13)
LYMPHOCYTES ABSOLUTE: 2.7 THOU/MM3 (ref 1–4.8)
LYMPHOCYTES NFR BLD AUTO: 23.1 %
MCH RBC QN AUTO: 31 PG (ref 26–33)
MCHC RBC AUTO-ENTMCNC: 32.9 GM/DL (ref 32.2–35.5)
MCV RBC AUTO: 94.3 FL (ref 80–94)
MONOCYTES ABSOLUTE: 1.4 THOU/MM3 (ref 0.4–1.3)
MONOCYTES NFR BLD AUTO: 11.6 %
NEUTROPHILS ABSOLUTE: 7.4 THOU/MM3 (ref 1.8–7.7)
NEUTROPHILS NFR BLD AUTO: 63.3 %
NRBC BLD AUTO-RTO: 0 /100 WBC
OSMOLALITY SERPL CALC.SUM OF ELEC: 279.6 MOSMOL/KG (ref 275–300)
PLATELET # BLD AUTO: 182 THOU/MM3 (ref 130–400)
PMV BLD AUTO: 10.3 FL (ref 9.4–12.4)
POTASSIUM SERPL-SCNC: 4.7 MEQ/L (ref 3.5–5.2)
PROT SERPL-MCNC: 7.2 G/DL (ref 6.1–8)
RBC # BLD AUTO: 4.55 MILL/MM3 (ref 4.7–6.1)
SODIUM SERPL-SCNC: 138 MEQ/L (ref 135–145)
WBC # BLD AUTO: 11.7 THOU/MM3 (ref 4.8–10.8)

## 2024-07-08 PROCEDURE — 93971 EXTREMITY STUDY: CPT

## 2024-07-08 PROCEDURE — 85025 COMPLETE CBC W/AUTO DIFF WBC: CPT

## 2024-07-08 PROCEDURE — 85610 PROTHROMBIN TIME: CPT

## 2024-07-08 PROCEDURE — 99283 EMERGENCY DEPT VISIT LOW MDM: CPT

## 2024-07-08 PROCEDURE — 85730 THROMBOPLASTIN TIME PARTIAL: CPT

## 2024-07-08 PROCEDURE — 80053 COMPREHEN METABOLIC PANEL: CPT

## 2024-07-08 PROCEDURE — 93010 ELECTROCARDIOGRAM REPORT: CPT | Performed by: INTERNAL MEDICINE

## 2024-07-08 PROCEDURE — 99284 EMERGENCY DEPT VISIT MOD MDM: CPT

## 2024-07-08 PROCEDURE — 93005 ELECTROCARDIOGRAM TRACING: CPT | Performed by: EMERGENCY MEDICINE

## 2024-07-08 PROCEDURE — 73564 X-RAY EXAM KNEE 4 OR MORE: CPT

## 2024-07-08 PROCEDURE — 36415 COLL VENOUS BLD VENIPUNCTURE: CPT

## 2024-07-08 RX ORDER — M-VIT,TX,IRON,MINS/CALC/FOLIC 27MG-0.4MG
1 TABLET ORAL DAILY
COMMUNITY

## 2024-07-08 ASSESSMENT — PAIN - FUNCTIONAL ASSESSMENT
PAIN_FUNCTIONAL_ASSESSMENT: NONE - DENIES PAIN
PAIN_FUNCTIONAL_ASSESSMENT: 0-10
PAIN_FUNCTIONAL_ASSESSMENT: 0-10

## 2024-07-08 ASSESSMENT — PAIN DESCRIPTION - ORIENTATION
ORIENTATION: POSTERIOR
ORIENTATION: LEFT
ORIENTATION: POSTERIOR

## 2024-07-08 ASSESSMENT — PAIN DESCRIPTION - LOCATION
LOCATION: KNEE
LOCATION: LEG
LOCATION: KNEE

## 2024-07-08 ASSESSMENT — PAIN SCALES - GENERAL
PAINLEVEL_OUTOF10: 0
PAINLEVEL_OUTOF10: 6
PAINLEVEL_OUTOF10: 6

## 2024-07-08 ASSESSMENT — PAIN DESCRIPTION - DESCRIPTORS
DESCRIPTORS: ACHING
DESCRIPTORS: ACHING

## 2024-07-08 ASSESSMENT — PAIN DESCRIPTION - PAIN TYPE: TYPE: ACUTE PAIN

## 2024-07-08 NOTE — DISCHARGE INSTRUCTIONS
You were seen and evaluated in the emergency department today after having an outpatient study positive for a DVT in your left leg popliteal vein.  You have had no recent bleeds or injury.  Fill your prescription for Xarelto and take as prescribed 15 mg twice daily for the first 21 days.  You will need to see your primary care physician for further management and prescription for different strength after that time.    Return to emergency department anytime for significant changes such as chest pain, shortness of breath, any other worrisome changes

## 2024-07-08 NOTE — ED TRIAGE NOTES
Pt presents to ED due to being sent by vascular ultrasound due to abnormal US. Pt states, \"I guess they think I have a blood clot.\" Pt also reports that he had a PE in June of 2023 and was on a blood thinner for it but stopped taking it in August 2023. Pt states, \"I went to [place] today because I just had this feeling I had a blood clot and they sent me here and I guess I was right.\" Pt notes that his left leg only hurts when walking and rates that pain 5/10. Pt denies pain at this time. Pt states that his leg just feels tight. Pt alert and oriented x4, EKG complete, pt denies chest pain or sob.

## 2024-07-08 NOTE — ED PROVIDER NOTES
[facility] EMERGENCY DEPARTMENT  EMERGENCY DEPARTMENT ENCOUNTER          Pt Name: Cezar Stahl  MRN: [id number]  Birthdate 1950  Date of evaluation: 7/8/2024  Resident Physician: Deven Persaud MD EM Resident PGY-3  Attending Physician: Guru Rowe DO       CHIEF COMPLAINT       Chief Complaint   Patient presents with    Sent By Vascular for Abnormal US         HISTORY OF PRESENT ILLNESS    HPI  Cezar Stahl is a 74 y.o. male who presents to the emergency department , from outpatient testing by private vehicle for evaluation of abnormal vascular ultrasound-DVT in left popliteal vein.  Patient reports that he had a PE in June 2023 and was on a blood thinner for it but stopped taking it in August 2023.  Patient states that his left leg only hurts when walking he rates his pain 5/10.  Currently denying pain.  States his left leg just feels tight.  He denies any chest pain, shortness of breath, bloody stools.      The patient has no other acute complaints at this time.    ROS negative except as stated above.    PAST MEDICAL AND SURGICAL HISTORY     Past Medical History:   Diagnosis Date    Benign prostatic hyperplasia     Degenerative arthritis of lumbar spine     Depression 06/04/2023    Diverticulosis     History of pulmonary embolism, bilateral 06/02/2023 June 2023. Neg w/u with heme. Completed 6 months of Xarelto DEC 2023    Hyperlipidemia     Hypertension     Insomnia     Obesity     Prediabetes     Tinnitus     Of unknown cause     Past Surgical History:   Procedure Laterality Date    CHOLECYSTECTOMY, LAPAROSCOPIC  2005         MEDICATIONS   No current facility-administered medications for this encounter.    Current Outpatient Medications:     rivaroxaban (XARELTO) 15 MG TABS tablet, Take 1 tablet by mouth 2 times daily (with meals) for 21 days, Disp: 42 tablet, Rfl: 0    Multiple Vitamins-Minerals (THERAPEUTIC MULTIVITAMIN-MINERALS) tablet, Take 1 tablet by mouth daily, Disp: ,

## 2024-07-08 NOTE — ED NOTES
Pt pink, warm and dry, breathing with ease. AVS reviewed. Denies questions or concerns. Pt remains alert and oriented. Pt discharged in stable condition.  Pt to go directly to [facility] vascular for outpt vascular study. Pt states understanding.

## 2024-07-08 NOTE — ED PROVIDER NOTES
[facility]  EMERGENCY DEPARTMENT ENCOUNTER   PHYSICIAN ATTESTATION    Pt Name: Cezar Stahl  MRN: [id number]  Birthdate 1950  Date of evaluation: 9/12/20      I personally saw and examined the patient. I have reviewed and agree with the Resident findings, including all diagnostic interpretations and treatment plans as written. I was present for the key portion of any procedures performed and the inclusive time noted in any critical care statement.       History:     74-year-old male initially seen at [facility] and was sent over for an outpatient Doppler of the leg where he was having pain in the left thigh area.  He does not really have any pitting edema on my exam.  The concern was that he has a history of thromboembolic disease on the past and used to be on Xarelto.  Xarelto has been stopped for some time.  Now once again he is positive for DVT in the left leg.  We are good to put him back on Xarelto as an outpatient.  Prescription started.  Follow-up with primary physician as soon as possible as this will need to be extended beyond the initial 3-week loading dose.    Patient denies any chest pain or dyspnea    Diagnosis: DVT with prior history of DVT                DO Jae Saldivar Lawrence, DO  07/08/24 [id number]

## 2024-07-08 NOTE — ED PROVIDER NOTES
unspecified chronicity (HCC)  Left leg pain  Diagnosis management comments: 74-year-old patient with history hypertension pulmonary embolism the presents to the emergency department complaining of 3 days of left knee pain associated with left swelling.  He denies any history of trauma on the left knee, no recent travel, no fever or systemic symptoms.  Physical examination left knee looks bigger than right 1, no external signs of infection in knee, no decreased range of motion.  Also no calf tenderness but tenderness in posterior aspect of knee.  Left knee x-ray with no acute findings.  Left DVT study ordered; patient was transferred to [facility] for DVT study.  Call from vascular radiology states DVT find positive, patient will be seen on ED [facility] hospital for oral anticoagulation and assessment.  I have called [facility] for presenting case.      /  ED Course as of 07/08/24 1601   Mon Jul 08, 2024   1529 Received a call from vascular service about positive DVT study.  Instructed vascular service that patient needs to go to to be seen in [facility] ED for anticoagulation.  At this point I am not able to see the final result.  I have called [facility] ED for presenting patient.  Spoke with Dr. Hickman. [JR]   1532 Left knee x-ray no acute findings [JR]      ED Course User Index  [JR] Thomas Calvert MD     Vitals Reviewed:    Vitals:    07/08/24 1302   BP: 124/62   Pulse: 93   Resp: 16   Temp: 98.4 °F (36.9 °C)   TempSrc: Temporal   SpO2: 95%   Weight: 103.4 kg (228 lb)   Height: 1.778 m (5' 10\")       The patient was seen and examined. Appropriate diagnostic testing was performed and results reviewed with the patient.      The results of pertinent diagnostic studies and exam findings were discussed. The patient’s provisional diagnosis and plan of care were discussed with the patient and present family who expressed understanding. Any medications were reviewed and indications and risks of medications were discussed with

## 2024-07-09 ENCOUNTER — TELEPHONE (OUTPATIENT)
Dept: FAMILY MEDICINE CLINIC | Age: 74
End: 2024-07-09

## 2024-07-09 DIAGNOSIS — I82.432 ACUTE DEEP VEIN THROMBOSIS (DVT) OF POPLITEAL VEIN OF LEFT LOWER EXTREMITY (HCC): Primary | ICD-10-CM

## 2024-07-09 NOTE — TELEPHONE ENCOUNTER
----- Message from Chaparro Jhaveri DO sent at 7/9/2024  6:53 AM EDT -----  Pt in ER yesterday  Dx DVT  Please schedule with one of the providers in the office this wk to f/u, thanks!

## 2024-07-09 NOTE — TELEPHONE ENCOUNTER
Patient came back to the office and states that per the pharmacy our office will need to send in a new Rx for Xarelto 15mg so that a prior auth can be done. Please send the Rx to [facility] on [address]. Patient is very concerned about getting this medication and it is very expensive out of pocket even with GoodRx.

## 2024-07-09 NOTE — TELEPHONE ENCOUNTER
Patient came to the office stating that he is unable to get his medication due to it needing a prior auth. Lyly SWANSON and I let him know that he would need to contact his insurance and see what to do due to us not being the prescribing provider. If he continues to have problems it may be possible for us to send in a new Rx for the Xarelto so we can do the Prior Auth through our office. Patient is now scheduled for a hospital follow up 07/10/2024 with James.

## 2024-07-10 ENCOUNTER — OFFICE VISIT (OUTPATIENT)
Dept: FAMILY MEDICINE CLINIC | Age: 74
End: 2024-07-10
Payer: MEDICARE

## 2024-07-10 VITALS
SYSTOLIC BLOOD PRESSURE: 138 MMHG | RESPIRATION RATE: 16 BRPM | HEART RATE: 114 BPM | BODY MASS INDEX: 31.26 KG/M2 | WEIGHT: 230.8 LBS | TEMPERATURE: 98.1 F | OXYGEN SATURATION: 96 % | HEIGHT: 72 IN | DIASTOLIC BLOOD PRESSURE: 86 MMHG

## 2024-07-10 DIAGNOSIS — K76.89 LIVER CYST: ICD-10-CM

## 2024-07-10 DIAGNOSIS — Z86.718 HX OF DEEP VENOUS THROMBOSIS: ICD-10-CM

## 2024-07-10 DIAGNOSIS — I82.432 ACUTE DEEP VEIN THROMBOSIS (DVT) OF POPLITEAL VEIN OF LEFT LOWER EXTREMITY (HCC): ICD-10-CM

## 2024-07-10 DIAGNOSIS — Z09 HOSPITAL DISCHARGE FOLLOW-UP: Primary | ICD-10-CM

## 2024-07-10 DIAGNOSIS — R91.1 NODULE OF RIGHT LUNG: ICD-10-CM

## 2024-07-10 PROCEDURE — 3079F DIAST BP 80-89 MM HG: CPT | Performed by: NURSE PRACTITIONER

## 2024-07-10 PROCEDURE — 1111F DSCHRG MED/CURRENT MED MERGE: CPT | Performed by: NURSE PRACTITIONER

## 2024-07-10 PROCEDURE — 99215 OFFICE O/P EST HI 40 MIN: CPT | Performed by: NURSE PRACTITIONER

## 2024-07-10 PROCEDURE — 3075F SYST BP GE 130 - 139MM HG: CPT | Performed by: NURSE PRACTITIONER

## 2024-07-10 PROCEDURE — 1124F ACP DISCUSS-NO DSCNMKR DOCD: CPT | Performed by: NURSE PRACTITIONER

## 2024-07-10 NOTE — PROGRESS NOTES
MG tablet Take 1-2 tablets by mouth nightly 180 tablet 3    lisinopril-hydroCHLOROthiazide (PRINZIDE;ZESTORETIC) 10-12.5 MG per tablet Take 1 tablet by mouth once daily 90 tablet 0        Medications patient taking as of now reconciled against medications ordered at time of hospital discharge: Yes    A comprehensive review of systems was negative except for what was noted in the HPI.    Objective:    /86   Pulse (!) 114   Temp 98.1 °F (36.7 °C) (Temporal)   Resp 16   Ht 1.829 m (6')   Wt 104.7 kg (230 lb 12.8 oz)   SpO2 96%   BMI 31.30 kg/m²   General Appearance: alert and oriented to person, place and time, well-developed and well-nourished, in no acute distress  Skin: warm and dry, no rash or erythema  Pulmonary/Chest: clear to auscultation bilaterally- no wheezes, rales or rhonchi, normal air movement, no respiratory distress and no chest wall tenderness  Cardiovascular: normal rate, normal S1 and S2, no gallops, intact distal pulses, and no carotid bruits  Abdomen: soft, non-tender, non-distended, normal bowel sounds, no masses or organomegaly  Extremities: no cyanosis, no clubbing, no edema, and LLE with cap refill 2+ left toes and foot pink and warm, LLE pedal pulses strong, negative kareem's left calf 14 inches in diameter  Right calf 13 inches in diameter   Musculoskeletal: normal range of motion, no joint swelling, deformity or tenderness  Neurologic: gait and coordination normal and speech normal      An electronic signature was used to authenticate this note.  --James Bennett, APRCAMILA - CNP

## 2024-07-17 NOTE — PROGRESS NOTES
tablet; Refill: 0      DISPOSITION    Return if symptoms worsen or fail to improve.    Cezar released without restrictions.    Future Appointments   Date Time Provider Department Center   10/1/2024  1:20 PM Chaparro Jhaveri DO [facility] [place]     PATIENT COUNSELING    Cezar received counseling on the following healthy behaviors: nutrition, exercise and medication adherence    Barriers to learning and self management: none    Discussed use, benefit, and side effects of prescribed medications.  Barriers to medication compliance addressed.  All patient questions answered.  Pt voiced understanding.       Electronically signed by Chaparro Jhaveri DO on 7/18/2024 at 11:08 AM

## 2024-07-18 ENCOUNTER — OFFICE VISIT (OUTPATIENT)
Dept: FAMILY MEDICINE CLINIC | Age: 74
End: 2024-07-18
Payer: MEDICARE

## 2024-07-18 VITALS
SYSTOLIC BLOOD PRESSURE: 134 MMHG | OXYGEN SATURATION: 98 % | WEIGHT: 233.4 LBS | RESPIRATION RATE: 18 BRPM | BODY MASS INDEX: 31.61 KG/M2 | HEIGHT: 72 IN | DIASTOLIC BLOOD PRESSURE: 82 MMHG | HEART RATE: 89 BPM | TEMPERATURE: 97.9 F

## 2024-07-18 DIAGNOSIS — M54.31 SCIATICA, RIGHT SIDE: ICD-10-CM

## 2024-07-18 DIAGNOSIS — I82.432 ACUTE DEEP VEIN THROMBOSIS (DVT) OF POPLITEAL VEIN OF LEFT LOWER EXTREMITY (HCC): Primary | ICD-10-CM

## 2024-07-18 DIAGNOSIS — R91.1 PULMONARY NODULE, RIGHT: ICD-10-CM

## 2024-07-18 DIAGNOSIS — Z86.711 HISTORY OF PULMONARY EMBOLISM: Chronic | ICD-10-CM

## 2024-07-18 DIAGNOSIS — K76.89 LIVER CYST: ICD-10-CM

## 2024-07-18 PROCEDURE — 3075F SYST BP GE 130 - 139MM HG: CPT | Performed by: FAMILY MEDICINE

## 2024-07-18 PROCEDURE — 1124F ACP DISCUSS-NO DSCNMKR DOCD: CPT | Performed by: FAMILY MEDICINE

## 2024-07-18 PROCEDURE — 99214 OFFICE O/P EST MOD 30 MIN: CPT | Performed by: FAMILY MEDICINE

## 2024-07-18 PROCEDURE — 3079F DIAST BP 80-89 MM HG: CPT | Performed by: FAMILY MEDICINE

## 2024-07-18 RX ORDER — PREDNISONE 20 MG/1
40 TABLET ORAL DAILY
Qty: 10 TABLET | Refills: 0 | Status: SHIPPED | OUTPATIENT
Start: 2024-07-18 | End: 2024-07-23

## 2024-08-13 ENCOUNTER — TELEPHONE (OUTPATIENT)
Dept: FAMILY MEDICINE CLINIC | Age: 74
End: 2024-08-13

## 2024-08-13 DIAGNOSIS — I82.432 ACUTE DEEP VEIN THROMBOSIS (DVT) OF POPLITEAL VEIN OF LEFT LOWER EXTREMITY (HCC): Primary | ICD-10-CM

## 2024-08-13 NOTE — TELEPHONE ENCOUNTER
The 15mg dose of Xarelto does not need RF  Does he have the 20mg at home?, he should be on that now. Let me know, thanks!

## 2024-08-13 NOTE — TELEPHONE ENCOUNTER
Ok  Should be 15mg bid x 21 days, then transition to 20mg daily  RF for 20mg sent  Let me know if has issues getting from pharmacy     Diagnosis Orders   1. Acute deep vein thrombosis (DVT) of popliteal vein of left lower extremity (HCC)  rivaroxaban (XARELTO) 20 MG TABS tablet        Medications Discontinued During This Encounter   Medication Reason    rivaroxaban (XARELTO) 15 MG TABS tablet DOSE ADJUSTMENT    rivaroxaban (XARELTO) 20 MG TABS tablet REORDER

## 2024-08-13 NOTE — TELEPHONE ENCOUNTER
Recent Visits  Date Type Provider Dept   07/18/24 Office Visit Chaparro Jhaveri, DO [facility]   07/10/24 Office Visit James Bennett APRN - CNP [facility]   04/23/24 Office Visit Chaparro Jhaveri, DO [facility]   04/11/24 Office Visit Chaparro Jhaveri, DO [facility]   04/01/24 Office Visit Chaparro Jhaveri, DO [facility]   02/28/24 Office Visit Chaparro Jhaveri, DO [facility]   09/25/23 Office Visit Chaparro Jhaveri, DO [facility]   08/29/23 Office Visit Chaparro Jhaveri, DO [facility]   06/09/23 Office Visit Chaparro Jhaveri, DO [facility]   03/29/23 Office Visit Chaparro Jhaveri, DO [facility]   Showing recent visits within past 540 days with a meds authorizing provider and meeting all other requirements  Future Appointments  Date Type Provider Dept   10/01/24 Appointment Chaparro Jhaveri, DO [facility]   Showing future appointments within next 150 days with a meds authorizing provider and meeting all other requirements

## 2024-08-20 RX ORDER — LISINOPRIL AND HYDROCHLOROTHIAZIDE 12.5; 1 MG/1; MG/1
1 TABLET ORAL DAILY
Qty: 90 TABLET | Refills: 3 | Status: SHIPPED | OUTPATIENT
Start: 2024-08-20

## 2024-09-05 ENCOUNTER — TELEPHONE (OUTPATIENT)
Dept: FAMILY MEDICINE CLINIC | Age: 74
End: 2024-09-05

## 2024-09-05 DIAGNOSIS — I10 ESSENTIAL HYPERTENSION: Primary | ICD-10-CM

## 2024-09-05 DIAGNOSIS — R73.9 HYPERGLYCEMIA: ICD-10-CM

## 2024-09-05 NOTE — TELEPHONE ENCOUNTER
Pt due for fasting labs prior to next apt on 10/1/2024. Please call to have pt complete this. Thanks!    ASSESSMENT & PLAN   Diagnosis Orders   1. Essential hypertension  CBC with Auto Differential    Comprehensive Metabolic Panel    Hemoglobin A1C    Lipid Panel    TSH with Reflex    Microalbumin / Creatinine Urine Ratio      2. Hyperglycemia  CBC with Auto Differential    Comprehensive Metabolic Panel    Hemoglobin A1C    Lipid Panel    TSH with Reflex    Microalbumin / Creatinine Urine Ratio        Future Appointments   Date Time Provider Department Center   10/1/2024  1:20 PM Chaparro Jhaveri, DO [facility]

## 2024-09-20 ENCOUNTER — LAB (OUTPATIENT)
Dept: LAB | Age: 74
End: 2024-09-20

## 2024-09-20 DIAGNOSIS — R73.9 HYPERGLYCEMIA: ICD-10-CM

## 2024-09-20 DIAGNOSIS — I10 ESSENTIAL HYPERTENSION: ICD-10-CM

## 2024-09-20 LAB
ALBUMIN SERPL BCG-MCNC: 3.7 G/DL (ref 3.5–5.1)
ALP SERPL-CCNC: 88 U/L (ref 38–126)
ALT SERPL W/O P-5'-P-CCNC: 20 U/L (ref 11–66)
ANION GAP SERPL CALC-SCNC: 12 MEQ/L (ref 8–16)
AST SERPL-CCNC: 19 U/L (ref 5–40)
BASOPHILS ABSOLUTE: 0.1 THOU/MM3 (ref 0–0.1)
BASOPHILS NFR BLD AUTO: 1.4 %
BILIRUB SERPL-MCNC: 0.7 MG/DL (ref 0.3–1.2)
BUN SERPL-MCNC: 22 MG/DL (ref 7–22)
CALCIUM SERPL-MCNC: 9.2 MG/DL (ref 8.5–10.5)
CHLORIDE SERPL-SCNC: 98 MEQ/L (ref 98–111)
CHOLEST SERPL-MCNC: 192 MG/DL (ref 100–199)
CO2 SERPL-SCNC: 26 MEQ/L (ref 23–33)
CREAT SERPL-MCNC: 1.3 MG/DL (ref 0.4–1.2)
CREAT UR-MCNC: 228.8 MG/DL
DEPRECATED MEAN GLUCOSE BLD GHB EST-ACNC: 141 MG/DL (ref 70–126)
DEPRECATED RDW RBC AUTO: 51.4 FL (ref 35–45)
EOSINOPHIL NFR BLD AUTO: 4.5 %
EOSINOPHILS ABSOLUTE: 0.4 THOU/MM3 (ref 0–0.4)
ERYTHROCYTE [DISTWIDTH] IN BLOOD BY AUTOMATED COUNT: 14.5 % (ref 11.5–14.5)
GFR SERPL CREATININE-BSD FRML MDRD: 57 ML/MIN/1.73M2
GLUCOSE SERPL-MCNC: 114 MG/DL (ref 70–108)
HBA1C MFR BLD HPLC: 6.7 % (ref 4.4–6.4)
HCT VFR BLD AUTO: 45.5 % (ref 42–52)
HDLC SERPL-MCNC: 51 MG/DL
HGB BLD-MCNC: 14.5 GM/DL (ref 14–18)
IMM GRANULOCYTES # BLD AUTO: 0.05 THOU/MM3 (ref 0–0.07)
IMM GRANULOCYTES NFR BLD AUTO: 0.6 %
LDLC SERPL CALC-MCNC: 122 MG/DL
LYMPHOCYTES ABSOLUTE: 3.4 THOU/MM3 (ref 1–4.8)
LYMPHOCYTES NFR BLD AUTO: 38.8 %
MCH RBC QN AUTO: 30.9 PG (ref 26–33)
MCHC RBC AUTO-ENTMCNC: 31.9 GM/DL (ref 32.2–35.5)
MCV RBC AUTO: 97 FL (ref 80–94)
MICROALBUMIN UR-MCNC: 1.22 MG/DL
MICROALBUMIN/CREAT RATIO PNL UR: 5 MG/G (ref 0–30)
MONOCYTES ABSOLUTE: 1.2 THOU/MM3 (ref 0.4–1.3)
MONOCYTES NFR BLD AUTO: 13.4 %
NEUTROPHILS ABSOLUTE: 3.6 THOU/MM3 (ref 1.8–7.7)
NEUTROPHILS NFR BLD AUTO: 41.3 %
NRBC BLD AUTO-RTO: 0 /100 WBC
PLATELET # BLD AUTO: 252 THOU/MM3 (ref 130–400)
PMV BLD AUTO: 10.4 FL (ref 9.4–12.4)
POTASSIUM SERPL-SCNC: 4.6 MEQ/L (ref 3.5–5.2)
PROT SERPL-MCNC: 7.6 G/DL (ref 6.1–8)
RBC # BLD AUTO: 4.69 MILL/MM3 (ref 4.7–6.1)
SODIUM SERPL-SCNC: 136 MEQ/L (ref 135–145)
TRIGL SERPL-MCNC: 96 MG/DL (ref 0–199)
TSH SERPL DL<=0.005 MIU/L-ACNC: 0.81 UIU/ML (ref 0.4–4.2)
WBC # BLD AUTO: 8.8 THOU/MM3 (ref 4.8–10.8)

## 2024-09-23 ENCOUNTER — TELEPHONE (OUTPATIENT)
Dept: FAMILY MEDICINE CLINIC | Age: 74
End: 2024-09-23

## 2024-10-01 ENCOUNTER — OFFICE VISIT (OUTPATIENT)
Dept: FAMILY MEDICINE CLINIC | Age: 74
End: 2024-10-01

## 2024-10-01 VITALS
OXYGEN SATURATION: 99 % | RESPIRATION RATE: 18 BRPM | SYSTOLIC BLOOD PRESSURE: 130 MMHG | HEIGHT: 72 IN | BODY MASS INDEX: 32.51 KG/M2 | TEMPERATURE: 97 F | HEART RATE: 81 BPM | DIASTOLIC BLOOD PRESSURE: 82 MMHG | WEIGHT: 240 LBS

## 2024-10-01 DIAGNOSIS — Z79.4 TYPE 2 DIABETES MELLITUS WITHOUT COMPLICATION, WITH LONG-TERM CURRENT USE OF INSULIN (HCC): Primary | ICD-10-CM

## 2024-10-01 DIAGNOSIS — I10 ESSENTIAL HYPERTENSION: ICD-10-CM

## 2024-10-01 DIAGNOSIS — E11.9 TYPE 2 DIABETES MELLITUS WITHOUT COMPLICATION, WITH LONG-TERM CURRENT USE OF INSULIN (HCC): Primary | ICD-10-CM

## 2024-10-01 DIAGNOSIS — Z23 NEEDS FLU SHOT: ICD-10-CM

## 2024-10-01 DIAGNOSIS — F32.5 MAJOR DEPRESSIVE DISORDER WITH SINGLE EPISODE, IN FULL REMISSION (HCC): ICD-10-CM

## 2024-10-01 DIAGNOSIS — E78.00 PURE HYPERCHOLESTEROLEMIA: ICD-10-CM

## 2024-10-01 DIAGNOSIS — H93.13 TINNITUS OF BOTH EARS: ICD-10-CM

## 2024-10-01 DIAGNOSIS — K76.89 LIVER CYST: ICD-10-CM

## 2024-10-01 DIAGNOSIS — G57.01 PIRIFORMIS SYNDROME OF RIGHT SIDE: ICD-10-CM

## 2024-10-01 DIAGNOSIS — F41.9 ANXIETY: ICD-10-CM

## 2024-10-01 DIAGNOSIS — R91.1 PULMONARY NODULE, RIGHT: ICD-10-CM

## 2024-10-01 DIAGNOSIS — F43.21 GRIEF: ICD-10-CM

## 2024-10-01 DIAGNOSIS — E66.9 OBESITY (BMI 30-39.9): Chronic | ICD-10-CM

## 2024-10-01 DIAGNOSIS — Z86.711 HISTORY OF PULMONARY EMBOLISM: Chronic | ICD-10-CM

## 2024-10-01 DIAGNOSIS — G47.00 INSOMNIA, UNSPECIFIED TYPE: ICD-10-CM

## 2024-10-01 DIAGNOSIS — I82.432 ACUTE DEEP VEIN THROMBOSIS (DVT) OF POPLITEAL VEIN OF LEFT LOWER EXTREMITY (HCC): ICD-10-CM

## 2024-10-01 DIAGNOSIS — R79.89 ELEVATED SERUM CREATININE: ICD-10-CM

## 2024-10-01 NOTE — PROGRESS NOTES
Vaccine Information Sheet, \"Influenza - Inactivated\"  given to Cezar Stahl, or parent/legal guardian of  Cezar Stahl and verbalized understanding.    Patient responses:    Have you ever had a reaction to a flu vaccine? No  Do you have an allergy to eggs, neomycin or polymixin?  No  Do you have an allergy to Thimerosal, contact lens solution, or Merthiolate? No  Have you ever had [place] Syndrome?  No  Do you have any current illness?  No  Do you have a temperature above 100 degrees? No  Are you pregnant? No  If pregnant, permission obtained from physician? No  Do you have an active neurological disorder? No      Flu vaccine given per order. Please see immunization tab.    Immunization(s) given during visit:    Immunizations Administered       Name Date Dose Route    Influenza, FLUAD, (age 65 y+), IM, Trivalent PF, 0.5mL 10/1/2024 0.5 mL Intramuscular    Site: Deltoid- Left    Lot: [id number]    NDC: 84209-674-48            Most recent Vaccine Information Sheet dated 8.6.2021 given to pt  
electronically signed by DR SOFYA MORRISON on 6/4/2023 12:38 PM      ASSESSMENT & PLAN  1. Type 2 diabetes mellitus without complication, with long-term current use of insulin (HCC)    New dx  At goal  Focus on lifestyle changes  F/u in office in 6 months, A1c then    -  DIABETES FOOT EXAM    2. Piriformis syndrome of right side    Heat  HEP  F/u if no better    3. Elevated serum creatinine    Mild elevation  Con't improved hydration  Labs this wk to f/u    - Basic Metabolic Panel-Without Glucose; Future    4. Obesity (BMI 30-39.9)    Discussed wt loss strategies.     5. Acute deep vein thrombosis (DVT) of popliteal vein of left lower extremity (HCC)    2nd thromboembolic event in the last year  Neg w/u with heme last year, 2023  Because this is his 2nd event, will need to be on OAC life long. Pt agreeable  Con't Xarelto.   Monitor for s/s PE and s/s worsening    6. History of pulmonary embolism, bilateral    As above    7. Insomnia, unspecified type    Stable  Con't Elavil    8. Major depressive disorder with single episode, in full remission (HCC)    Improved  Con't Elavil for sleep  Off Buspar  Monitor     9. Anxiety    As above    10. Grief    As above    11. Tinnitus of both ears    Stable  Monitor  If worsens, consider f/u [facility]    12. Essential hypertension    Stable  con't Zestoretic.     13. Pure hypercholesterolemia    Stable  con't diet control  Did not tolerate statin, but has aged out of needing a statin  Labs appropriate    14. Pulmonary nodule, right    Reviewed current recommendations for f/u on pulm nodules with pt  Pt would prefer not to have f/u scan done unless absolutely needed  At this point, per the Fleischner criteria, based on the size and his low risk of malignancy of this nodule, he does not need a f/u scan  I have cancelled the f/u CT per pts request.     15. Liver cysts    Reviewed current recommendations with pt regarding f/u on benign appearing liver cysts  Currently guidelines to not

## 2024-10-01 NOTE — PATIENT INSTRUCTIONS
LAB INSTRUCTIONS:    Please complete labs within 1 week(s).    Non-fasting ok.     The clinic will call you within 1 week of collection. If you have not heard from us within that amount of time, please call us at [phone].

## 2024-10-07 ENCOUNTER — TELEPHONE (OUTPATIENT)
Dept: FAMILY MEDICINE CLINIC | Age: 74
End: 2024-10-07

## 2024-10-07 ENCOUNTER — LAB (OUTPATIENT)
Dept: LAB | Age: 74
End: 2024-10-07

## 2024-10-07 DIAGNOSIS — R79.89 ELEVATED SERUM CREATININE: ICD-10-CM

## 2024-10-07 LAB
ANION GAP SERPL CALC-SCNC: 12 MEQ/L (ref 8–16)
BUN SERPL-MCNC: 21 MG/DL (ref 7–22)
CALCIUM SERPL-MCNC: 9.1 MG/DL (ref 8.5–10.5)
CHLORIDE SERPL-SCNC: 100 MEQ/L (ref 98–111)
CO2 SERPL-SCNC: 25 MEQ/L (ref 23–33)
CREAT SERPL-MCNC: 1.1 MG/DL (ref 0.4–1.2)
GFR SERPL CREATININE-BSD FRML MDRD: 69 ML/MIN/1.73M2
POTASSIUM SERPL-SCNC: 4 MEQ/L (ref 3.5–5.2)
SODIUM SERPL-SCNC: 137 MEQ/L (ref 135–145)

## 2024-10-07 NOTE — TELEPHONE ENCOUNTER
----- Message from STEPHANIE Lawson CNP sent at 10/7/2024  2:26 PM EDT -----  Let Tomer know his labs are stable and in appropriate ranges.

## 2024-11-04 DIAGNOSIS — I82.432 ACUTE DEEP VEIN THROMBOSIS (DVT) OF POPLITEAL VEIN OF LEFT LOWER EXTREMITY (HCC): Primary | ICD-10-CM

## 2024-11-04 NOTE — TELEPHONE ENCOUNTER
Recent Visits  Date Type Provider Dept   10/01/24 Office Visit Chaparro Jhaveri, DO [facility]   07/18/24 Office Visit Chaparro Jhaveri, DO [facility]   07/10/24 Office Visit James Bennett APRN - CNP [facility]   04/23/24 Office Visit Chaparro Jhaveri, DO [facility]   04/11/24 Office Visit Chaparro Jhaveri, DO [facility]   04/01/24 Office Visit Chaparro Jhaveri, DO [facility]   02/28/24 Office Visit Chaparro Jhaveri, DO [facility]   09/25/23 Office Visit Chaparro Jhaveri, DO [facility]   08/29/23 Office Visit Chaparro Jhaveri, DO [facility]   06/09/23 Office Visit Chaparro Jhaveri, DO [facility]   Showing recent visits within past 540 days with a meds authorizing provider and meeting all other requirements  Future Appointments  Date Type Provider Dept   04/03/25 Appointment Chaparro Jhaveri, DO [facility]   Showing future appointments within next 150 days with a meds authorizing provider and meeting all other requirements

## 2024-12-07 ENCOUNTER — HOSPITAL ENCOUNTER (EMERGENCY)
Age: 74
Discharge: HOME | End: 2024-12-07
Attending: EMERGENCY MEDICINE
Payer: MEDICARE

## 2024-12-07 VITALS
HEIGHT: 72 IN | SYSTOLIC BLOOD PRESSURE: 150 MMHG | WEIGHT: 235 LBS | OXYGEN SATURATION: 95 % | RESPIRATION RATE: 20 BRPM | BODY MASS INDEX: 31.83 KG/M2 | HEART RATE: 70 BPM | DIASTOLIC BLOOD PRESSURE: 70 MMHG | TEMPERATURE: 99 F

## 2024-12-07 DIAGNOSIS — U07.1 COVID-19: Primary | ICD-10-CM

## 2024-12-07 LAB
INFLUENZA A BY PCR: NOT DETECTED
INFLUENZA B BY PCR: NOT DETECTED
SARS-COV-2 RNA, RT PCR: DETECTED

## 2024-12-07 PROCEDURE — 99283 EMERGENCY DEPT VISIT LOW MDM: CPT

## 2024-12-07 PROCEDURE — 87636 SARSCOV2 & INF A&B AMP PRB: CPT

## 2024-12-07 ASSESSMENT — PAIN - FUNCTIONAL ASSESSMENT: PAIN_FUNCTIONAL_ASSESSMENT: NONE - DENIES PAIN

## 2024-12-07 NOTE — DISCHARGE INSTRUCTIONS
"OT Daily Progress Note    Patient:  Marilee DONOVAN [facility] #:  [id number]   Date of Note: 12/07/2017   Referring Physician:  Gary Regan Jr., *  Diagnosis:  S/P Distal Radius ORIF  Encounter Diagnoses   Name Primary?    Left wrist pain     Decreased ROM of wrist     Impaired mobility and ADLs         Start Time: 1100  End Time: 1147  Total Time: 47 min  Group Time: 0    Visit 11 of 20 authorized    Number of Cancellations: 0  Number of No Shows: 0    Subjective:   "It's getting a lot better but I am still weak"   Pain: 4 out of 10 L wrist     Objective:   Patient seen by OT this session. Treatment  consist of the following:  Paraffin, Fluidotherapy, Ultrasound, manual therapy/joint mobilization, Therapeutic exercises, Joint protection and Entergy conservation     Patient received paraffin bath to L hand(s) for 10 minutes to increase blood flow, circulation, pain management and for tissue elasticity prior to therex. Patient received ultrasound to L scar area to increase blood flow, circulation, tissue elasticity, pain management and for wound/scar management for 8 minutes @ 3.3 Mhz, Intensity 0.6 w/cm2 at 100% duty cycle. Pt was instructed on and performed the following therapuetic exercises: juxtaciser x 2 min, hammer stretch for supination/pronation x 2 min, yellow putty gross squeezes x 20 reps; exercises performed for stretching, ROM, and strengthening.  Performed 2# free weight for L wrist flexion/extension (2 positions), RD/UD, supination/pronation x 15 reps x 2 sets each for wrist strengthening. Received light PROM and LLPS /c 2# weight in wrist flexion x 4 sets of 30 secs. Patient performed puttycisers jar, bottle top, and key for hand strengthening x 10 reps each with red putty.       Assessment:  Pt will continue to benefit from skilled OT intervention.  Tolerated all treatment well today with no adverse effects. Pt arrived with more flexion than in previous sessions, specifically 50 degrees. " Increase fluids at home.  Try Tylenol for aches pains or fever.  Use over-the-counter cough or cold medication as needed.  Avoid close contacts as possible.  Follow-up with primary care   Confinues use of hand for functional tasks at home but continues to require extended time. Still with some tremors with 2# wrist weight evident of decreased strengthening. Patient continues to demonstrate limitation  with  ROM, Joint mobility, Stiffness, Decreased functional use, Decreased strength, Continued pain and Continued inflammation all of which interfere with pt's vocational and leisurely pursuits.     New/Revised Goals: Continue POC  1)   Patient to be IND with HEP and modalities for pain management  2)   Increase ROM 10 degrees in all wrist planes of motion to increase functional hand use for dressing.  3)   Assess  and pinch.   4)   Decrease edema .2-.3 cm to increase joint mobility /flexibility for functional hand use.   5)   Patient to score at 57% or more on FOTO to demonstrate improved perception of functional LUE use.      Plan:  POC extended: 2 times weekly for 4 weeks to continue working towards goals.

## 2024-12-07 NOTE — ED TRIAGE NOTES
Pt comes walking into ER room 6 from triage with wanting a COVID TEST. Pt tonight started having some cold & sinus type symptoms and when talking to family members, they talked him into getting checked out. He has no real concerns other than wanting a COVID test.

## 2024-12-07 NOTE — ED PROVIDER NOTES
[facility]  [address] [place] [id number]  Phone: [phone]  EMERGENCY DEPARTMENT ENCOUNTER      Pt Name: Cezar Stahl  MRN: [id number]  Birthdate 1950  Date of evaluation: 12/7/2024  Provider: Saurabh Gutiérrez MD    CHIEF COMPLAINT       Chief Complaint   Patient presents with    Concern For COVID-19         HISTORY OF PRESENT ILLNESS      Cezar Stahl is a 74 y.o. male who presents to the emergency department with above-noted complaint.  Patient is doing okay has had some congestion.  Vitals just a head cold.  He spoke to his family members and they told him to get checked out.  He denies other symptoms chest pain or shortness of breath        REVIEW OF SYSTEMS     Positive for head cold sinus pressure  Review of Systems  All systems negative except as marked.     PAST MEDICAL HISTORY     Past Medical History:   Diagnosis Date    Benign prostatic hyperplasia     Degenerative arthritis of lumbar spine     Depression 06/04/2023    Diverticulosis     DM2 (diabetes mellitus, type 2) (HCC)     History of pulmonary embolism, bilateral 06/02/2023 June 2023. Neg w/u with heme. Completed 6 months of Xarelto DEC 2023    Hyperlipidemia     Hypertension     Insomnia     Obesity (BMI 30-39.9)     Tinnitus     Of unknown cause         SURGICAL HISTORY       Past Surgical History:   Procedure Laterality Date    CHOLECYSTECTOMY, LAPAROSCOPIC  2005         CURRENT MEDICATIONS       Previous Medications    AMITRIPTYLINE (ELAVIL) 25 MG TABLET    Take 1-2 tablets by mouth nightly    LISINOPRIL-HYDROCHLOROTHIAZIDE (PRINZIDE;ZESTORETIC) 10-12.5 MG PER TABLET    Take 1 tablet by mouth daily    MULTIPLE VITAMINS-MINERALS (THERAPEUTIC MULTIVITAMIN-MINERALS) TABLET    Take 1 tablet by mouth daily    RIVAROXABAN (XARELTO) 20 MG TABS TABLET    Take 1 tablet by mouth daily (with breakfast)       ALLERGIES       Penicillins    FAMILY HISTORY       Family History   Problem Relation Age of  Onset    Heart Attack Mother     Diabetes Mother           SOCIAL HISTORY       Social History     Tobacco Use    Smoking status: Never    Smokeless tobacco: Never   Vaping Use    Vaping status: Never Used   Substance Use Topics    Alcohol use: No    Drug use: No         PHYSICAL EXAM           Physical Exam    VITAL SIGNS: BP (!) 150/70   Pulse 70   Temp 99 °F (37.2 °C) (Oral)   Resp 20   Ht 1.829 m (6')   Wt 106.6 kg (235 lb)   SpO2 95%   BMI 31.87 kg/m²    Constitutional:  Alert not toxtic or ill,   HENT:  Normocephalic, Atraumatic, oropharynx is normal  Cervical Spine: Normal range of motion,  No stridor.   Eyes:  No discharge or  Swelling  Respiratory: No respiratory distress, clear  Musculoskeletal:  Intact distal pulses, No edema, No tenderness, No tenderness to palpation or major deformities noted.   Integument:  No rash (on exposed areas)   Neurologic:  alert & appropriate       DIAGNOSTIC RESULTS       EKG:      RADIOLOGY:         Interpretation per the Radiologist below, if available at the time of this note:    No orders to display         LABS:  Labs Reviewed   COVID-19 & INFLUENZA COMBO   COVID-19 & INFLUENZA COMBO       All other labs were within normal range or not returned as of this dictation.      MIPS    Not applicable      EMERGENCY DEPARTMENT COURSE and DIFFERENTIAL DIAGNOSIS/MDM:   Patient presents with URI rhinorrhea type symptoms.  Feels like he has got a cold.  His family wanted him tested for COVID.    1)  Number and Complexity of Problems  Problem List This Visit:   Sinus pressure cold symptoms  Problem List Items Addressed This Visit    None  Visit Diagnoses       COVID-19    -  Primary            Differential Diagnosis: Viral syndrome, COVID, flu      2)  Data Reviewed    Imaging that is independently reviewed with the associated radiologist report, not interpreted by me are:  Not applicable    Imaging that is independently reviewed and interpreted by me as:  Not

## 2024-12-09 ENCOUNTER — TELEPHONE (OUTPATIENT)
Dept: FAMILY MEDICINE CLINIC | Age: 74
End: 2024-12-09

## 2025-01-02 ENCOUNTER — OFFICE VISIT (OUTPATIENT)
Dept: FAMILY MEDICINE CLINIC | Age: 75
End: 2025-01-02

## 2025-01-02 VITALS
OXYGEN SATURATION: 98 % | RESPIRATION RATE: 14 BRPM | HEIGHT: 72 IN | WEIGHT: 246 LBS | TEMPERATURE: 97.7 F | DIASTOLIC BLOOD PRESSURE: 78 MMHG | BODY MASS INDEX: 33.32 KG/M2 | SYSTOLIC BLOOD PRESSURE: 126 MMHG | HEART RATE: 54 BPM

## 2025-01-02 DIAGNOSIS — R05.9 COUGH, UNSPECIFIED TYPE: ICD-10-CM

## 2025-01-02 DIAGNOSIS — R11.10 VOMITING, UNSPECIFIED VOMITING TYPE, UNSPECIFIED WHETHER NAUSEA PRESENT: ICD-10-CM

## 2025-01-02 DIAGNOSIS — J32.9 SINOBRONCHITIS: ICD-10-CM

## 2025-01-02 DIAGNOSIS — R09.81 CONGESTION OF NASAL SINUS: Primary | ICD-10-CM

## 2025-01-02 DIAGNOSIS — J40 SINOBRONCHITIS: ICD-10-CM

## 2025-01-02 LAB
INFLUENZA A ANTIBODY: NEGATIVE
INFLUENZA B ANTIBODY: NEGATIVE
Lab: NORMAL
QC PASS/FAIL: NORMAL
SARS-COV-2 RDRP RESP QL NAA+PROBE: NEGATIVE

## 2025-01-02 RX ORDER — PREDNISONE 20 MG/1
40 TABLET ORAL DAILY
Qty: 10 TABLET | Refills: 0 | Status: SHIPPED | OUTPATIENT
Start: 2025-01-02 | End: 2025-01-07

## 2025-01-02 RX ORDER — DOXYCYCLINE HYCLATE 100 MG
100 TABLET ORAL 2 TIMES DAILY
Qty: 14 TABLET | Refills: 0 | Status: SHIPPED | OUTPATIENT
Start: 2025-01-02 | End: 2025-01-09

## 2025-01-02 NOTE — PROGRESS NOTES
\"Tomer\" was seen today for vomiting, cough and congestion.    Diagnoses and all orders for this visit:    Congestion of nasal sinus  -     POCT COVID-19 Rapid, NAAT  -     POCT Influenza A/B    Cough, unspecified type  -     POCT COVID-19 Rapid, NAAT  -     POCT Influenza A/B    Vomiting, unspecified vomiting type, unspecified whether nausea present  -     POCT COVID-19 Rapid, NAAT  -     POCT Influenza A/B    Sinobronchitis  -     predniSONE (DELTASONE) 20 MG tablet; Take 2 tablets by mouth daily for 5 days  -     doxycycline hyclate (VIBRA-TABS) 100 MG tablet; Take 1 tablet by mouth 2 times daily for 7 days    Covid and Flu negative today  COVID + 12/7/2024    FU if no better    -Start above treatments  -Patient advised to contact our office immediately if symptoms worsen or persist  -Patient counseled on conservative care including fluids, rest and OTC meds      I have reviewed this patient's history, habits, and medication list and have updated the chart where appropriate.

## 2025-01-06 DIAGNOSIS — F51.01 PRIMARY INSOMNIA: Chronic | ICD-10-CM

## 2025-01-06 NOTE — TELEPHONE ENCOUNTER
Future Appointments   Date Time Provider Department Center   4/3/2025  2:00 PM Chaparro Jhaveri, DO [facility]     1/2/2025 - patients last appointment

## 2025-01-06 NOTE — TELEPHONE ENCOUNTER
Medication pended      Recent Visits  Date Type Provider Dept   01/02/25 Office Visit Chelly Yanes, APRN - CNP [facility]   10/01/24 Office Visit Chaparro Jhaveri, DO [facility]   07/18/24 Office Visit Chaparro Jhaveri, DO [facility]   07/10/24 Office Visit Bennett, Jan APRN - CNP [facility]   04/23/24 Office Visit Chaparro Jhaveri, DO [facility]   04/11/24 Office Visit Chaparro Jhaveri, DO [facility]   04/01/24 Office Visit Chaparro Jhaveri, DO [facility]   02/28/24 Office Visit Chaparro Jhaveri, DO [facility]   09/25/23 Office Visit Chaparro Jhaveri, DO [facility]   08/29/23 Office Visit Chaparro Jhaveri, DO [facility]   Showing recent visits within past 540 days with a meds authorizing provider and meeting all other requirements  Future Appointments  Date Type Provider Dept   04/03/25 Appointment Chaparro Jhaveri, DO [facility]   Showing future appointments within next 150 days with a meds authorizing provider and meeting all other requirements

## 2025-01-14 ENCOUNTER — OFFICE VISIT (OUTPATIENT)
Dept: FAMILY MEDICINE CLINIC | Age: 75
End: 2025-01-14

## 2025-01-14 VITALS
OXYGEN SATURATION: 98 % | TEMPERATURE: 97.9 F | HEART RATE: 76 BPM | WEIGHT: 245.6 LBS | DIASTOLIC BLOOD PRESSURE: 80 MMHG | HEIGHT: 72 IN | RESPIRATION RATE: 18 BRPM | BODY MASS INDEX: 33.26 KG/M2 | SYSTOLIC BLOOD PRESSURE: 132 MMHG

## 2025-01-14 DIAGNOSIS — I82.432 ACUTE DEEP VEIN THROMBOSIS (DVT) OF POPLITEAL VEIN OF LEFT LOWER EXTREMITY (HCC): Primary | ICD-10-CM

## 2025-01-14 DIAGNOSIS — J40 BRONCHITIS: ICD-10-CM

## 2025-01-14 DIAGNOSIS — Z86.718 HISTORY OF RECURRENT DEEP VEIN THROMBOSIS (DVT): ICD-10-CM

## 2025-01-14 SDOH — ECONOMIC STABILITY: FOOD INSECURITY: WITHIN THE PAST 12 MONTHS, THE FOOD YOU BOUGHT JUST DIDN'T LAST AND YOU DIDN'T HAVE MONEY TO GET MORE.: NEVER TRUE

## 2025-01-14 SDOH — ECONOMIC STABILITY: FOOD INSECURITY: WITHIN THE PAST 12 MONTHS, YOU WORRIED THAT YOUR FOOD WOULD RUN OUT BEFORE YOU GOT MONEY TO BUY MORE.: NEVER TRUE

## 2025-01-14 ASSESSMENT — PATIENT HEALTH QUESTIONNAIRE - PHQ9
SUM OF ALL RESPONSES TO PHQ QUESTIONS 1-9: 0
SUM OF ALL RESPONSES TO PHQ QUESTIONS 1-9: 0
6. FEELING BAD ABOUT YOURSELF - OR THAT YOU ARE A FAILURE OR HAVE LET YOURSELF OR YOUR FAMILY DOWN: NOT AT ALL
10. IF YOU CHECKED OFF ANY PROBLEMS, HOW DIFFICULT HAVE THESE PROBLEMS MADE IT FOR YOU TO DO YOUR WORK, TAKE CARE OF THINGS AT HOME, OR GET ALONG WITH OTHER PEOPLE: NOT DIFFICULT AT ALL
7. TROUBLE CONCENTRATING ON THINGS, SUCH AS READING THE NEWSPAPER OR WATCHING TELEVISION: NOT AT ALL
9. THOUGHTS THAT YOU WOULD BE BETTER OFF DEAD, OR OF HURTING YOURSELF: NOT AT ALL
1. LITTLE INTEREST OR PLEASURE IN DOING THINGS: NOT AT ALL
5. POOR APPETITE OR OVEREATING: NOT AT ALL
3. TROUBLE FALLING OR STAYING ASLEEP: NOT AT ALL
SUM OF ALL RESPONSES TO PHQ QUESTIONS 1-9: 0
4. FEELING TIRED OR HAVING LITTLE ENERGY: NOT AT ALL
8. MOVING OR SPEAKING SO SLOWLY THAT OTHER PEOPLE COULD HAVE NOTICED. OR THE OPPOSITE, BEING SO FIGETY OR RESTLESS THAT YOU HAVE BEEN MOVING AROUND A LOT MORE THAN USUAL: NOT AT ALL
SUM OF ALL RESPONSES TO PHQ9 QUESTIONS 1 & 2: 0
SUM OF ALL RESPONSES TO PHQ QUESTIONS 1-9: 0
2. FEELING DOWN, DEPRESSED OR HOPELESS: NOT AT ALL

## 2025-01-14 NOTE — PROGRESS NOTES
Chief Complaint   Patient presents with    Discuss Medications     Xaralto expensive    Bronchitis follow-up     History obtained from the patient.    SUBJECTIVE:  Cezar Stahl is a 74 y.o. male that presents today for     -DVT LLE:  Dx DVT LLE 7/8/23  Placed on Xarelto at that time  Pt with prior bilat PE June 2023, unprovoked  Treated with Xarelto x 6 months  Saw heme after that PE  Had neg coag w/u  No prior hx of thromboembolic dz  Completed 6 months of Xarelto in DEC 2023   Heme was ok with him coming off Xarelto at that time  Unfortunately, dx with another, unprovoked DVT in July 2024  Back on Xarelto  Leg doing better  No swelling  No pain  No CP/SOB  Pt wanted to discuss today of still needs to take long-term  Tolerating well  Is worried about the cost.       -Bronchitis:  Seen 2 wks ago for this by [facility] while I was out  Txt Doxy/Prednisone.   Sig better  Occ cough  Wants lungs listened to.       Age/Gender Health Maintenance    Lipid -   Lab Results   Component Value Date    CHOL 192 09/20/2024    CHOL 180 09/20/2023    CHOL 188 09/26/2022     Lab Results   Component Value Date    TRIG 96 09/20/2024    TRIG 100 09/20/2023    TRIG 70 09/26/2022     Lab Results   Component Value Date    HDL 51 09/20/2024    HDL 50 09/20/2023    HDL 58 09/26/2022     Lab Results   Component Value Date     09/20/2024     09/20/2023     09/26/2022       Colon Cancer Screening - 1 polyp JAN 2024, repeat 5 years per [facility].   Lung Cancer Screening - never smoker    Tetanus - to get at pharmacy per medicare rules  Influenza Vaccine - UTD FALL 2024  Pneumonia Vaccine - UTD X2 both PPV 23 and PCV 13  Zostavax - UTD 2014  Shingrix - to get at pharmacy per medicare rules    PSA testing discussion - 1.15 MAY 2014  Lab Results   Component Value Date    PSA 1.32 11/02/2019    PSA 1.32 11/03/2018    PSA 1.19 05/05/2018       AAA Screening - never smoker.     Diabetes Health Maintenance    A1C -   Lab

## 2025-01-21 ENCOUNTER — TELEPHONE (OUTPATIENT)
Dept: FAMILY MEDICINE CLINIC | Age: 75
End: 2025-01-21

## 2025-01-21 NOTE — TELEPHONE ENCOUNTER
Ok.  There does appear to be additional information the pt has to provide regarding financial information.  Placed in tray by printer for whomever needs to f/u on this, thanks!

## 2025-01-21 NOTE — TELEPHONE ENCOUNTER
Pt called and stated that he needs a form from [employer], its a website, for him to get assistance for his Xaralto. He is not able to get it printed out, but was told by the company that his PCP office would have the form and can fill it out for him.

## 2025-01-21 NOTE — TELEPHONE ENCOUNTER
I printed the [employer] patient assistance enrollment forms, filled out what I could fill out. Contacted patient, will be coming in to complete his part.     Thank you

## 2025-01-21 NOTE — TELEPHONE ENCOUNTER
Should be in your inbasket per Susannah, I think there is a fax number on it. Will fax it once signed      Thank you

## 2025-02-05 ENCOUNTER — TELEPHONE (OUTPATIENT)
Dept: FAMILY MEDICINE CLINIC | Age: 75
End: 2025-02-05

## 2025-02-05 DIAGNOSIS — I82.432 ACUTE DEEP VEIN THROMBOSIS (DVT) OF POPLITEAL VEIN OF LEFT LOWER EXTREMITY (HCC): Primary | ICD-10-CM

## 2025-02-05 NOTE — TELEPHONE ENCOUNTER
Pt called in stating that the medication Xarelto is costing him 600$ and he is not able to afford that.       Is there another option?  Please advise.

## 2025-02-05 NOTE — TELEPHONE ENCOUNTER
Coumadin does require lab work on a routine basis, via the coumadin clinic.   Initially may need to get checked weekly.  Once stabilized on the coumadin, typically every 4 to 6 wks     Diagnosis Orders   1. Acute deep vein thrombosis (DVT) of popliteal vein of left lower extremity (HCC)  rivaroxaban (XARELTO) 20 MG TABS tablet

## 2025-02-05 NOTE — TELEPHONE ENCOUNTER
Pt is going to call insurance to see what the approx price would be of those medications and will call back.

## 2025-02-05 NOTE — TELEPHONE ENCOUNTER
Pt informed of message from Dr. Jhaveri and voiced understanding.   Pt is going to think about his options and give us a call back

## 2025-02-05 NOTE — TELEPHONE ENCOUNTER
Eliquis was $700  Pradaxa was $675  Coumadin was $4 but they said he has to have his Blood checked every two days?        Can you send a month worth of the Xarelto to Pt. And this way it gives him time to see what else he can get through insurance.       [facility] on [address]

## 2025-02-18 DIAGNOSIS — I82.432 ACUTE DEEP VEIN THROMBOSIS (DVT) OF POPLITEAL VEIN OF LEFT LOWER EXTREMITY (HCC): ICD-10-CM

## 2025-02-18 NOTE — TELEPHONE ENCOUNTER
Recent Visits  Date Type Provider Dept   01/14/25 Office Visit Chaparro Jhaveri, DO [facility]   01/02/25 Office Visit Chelly Yanes APRN - CNP [facility]   10/01/24 Office Visit Chaparro Jhaveri, DO [facility]   07/18/24 Office Visit Chaparro Jhaveri, DO [facility]   07/10/24 Office Visit James Bennett APRN - CNP [facility]   04/23/24 Office Visit Chaparro Jhaveri, DO [facility]   04/11/24 Office Visit Chaparro Jhaveri, DO [facility]   04/01/24 Office Visit Chaparro Jhaveri, DO [facility]   02/28/24 Office Visit Chaparro Jhaveri, DO [facility]   09/25/23 Office Visit Chaparro Jhaveri, DO [facility]   Showing recent visits within past 540 days with a meds authorizing provider and meeting all other requirements  Future Appointments  Date Type Provider Dept   04/03/25 Appointment Chaparro Jhaveri, DO [facility]   Showing future appointments within next 150 days with a meds authorizing provider and meeting all other requirements    Pt is asking for a year refill

## 2025-02-26 ENCOUNTER — TELEPHONE (OUTPATIENT)
Dept: FAMILY MEDICINE CLINIC | Age: 75
End: 2025-02-26

## 2025-02-26 DIAGNOSIS — Z86.718 HISTORY OF RECURRENT DEEP VEIN THROMBOSIS (DVT): Primary | ICD-10-CM

## 2025-02-26 DIAGNOSIS — I82.432 ACUTE DEEP VEIN THROMBOSIS (DVT) OF POPLITEAL VEIN OF LEFT LOWER EXTREMITY (HCC): ICD-10-CM

## 2025-02-26 NOTE — TELEPHONE ENCOUNTER
Pt called stating he wants to stay on xarelto not coumadin    [facility]    He will need a rx faxed to [phone] for a 3 month supply $171.00   # [phone]    Pt will need to set up acct with them if you can send RX in for him

## 2025-02-26 NOTE — TELEPHONE ENCOUNTER
Pt states he will call us on Monday after talking to the company and let us know we can fax the Rx

## 2025-02-26 NOTE — TELEPHONE ENCOUNTER
Ok  Rx in tray by printer.  I'm assuming he'll let us know when he has the account setup to we can fax the RX.      Diagnosis Orders   1. History of recurrent deep vein thrombosis (DVT)  rivaroxaban (XARELTO) 20 MG TABS tablet      2. Acute deep vein thrombosis (DVT) of popliteal vein of left lower extremity (HCC)  rivaroxaban (XARELTO) 20 MG TABS tablet

## 2025-02-26 NOTE — TELEPHONE ENCOUNTER
Pt informed to let us know when he has the account set up so we can fax that prescription over.           Fax number is in the previous message

## 2025-04-02 PROBLEM — Z86.718 HISTORY OF RECURRENT DEEP VEIN THROMBOSIS (DVT): Chronic | Status: ACTIVE | Noted: 2025-04-02

## 2025-04-02 PROBLEM — Z86.718 HISTORY OF RECURRENT DEEP VEIN THROMBOSIS (DVT): Status: ACTIVE | Noted: 2025-04-02

## 2025-04-02 NOTE — PROGRESS NOTES
Chief Complaint   Patient presents with    Medicare AW    Follow-up     DM2/ etc     History obtained from the patient.    SUBJECTIVE:  Cezar Stahl is a 74 y.o. male that presents today for     -DM2:  New dx 2024  preDM prior  Working on lifestyle changes  A1c well controlled    Lab Results   Component Value Date/Time    LABA1C 6.4 2025 12:07 PM    LABA1C 6.7 2024 09:48 AM    LABA1C 6.5 2024 09:23 AM    LABA1C 6.2 2023 09:39 AM    LABA1C 6.3 2023 07:09 AM    LABA1C 6.3 2023 09:34 AM    LABA1C 5.9 2022 10:50 AM       -Obesity:  Working on lifestyle changes      -DVT LLE:  Dx DVT LLE 24  Placed on Xarelto then  Prior to 2024 DVT, pt had bilat PE 2023, unprovoked  Treated with Xarelto x 6 months then  Saw heme after that 1st PE  Had neg coag w/u at that time  No prior hx of thromboembolic dz prior to these events  Completed 6 months of Xarelto in DEC 2023 for PE  Heme was ok with him coming off Xarelto at that time  But then had DVT above, so is on Xarelto life-long      -Insomnia:  On Elavil  Working well      -Depression/Anxiety/Grief:  Was having a lot of anxiety and grief after wife  in   Was placed on Buspar, has since weaned off  Anxiety better, not taking buspar any more  No SI/hI       -Tinnitus PRIOR VISIT: chronic issue, bilateral. Had for 20 years. No cause. No cure. Used to be pretty bad, but had been ok until 4 months ago. Started to become severe again. Hard to deal with. Denies SI/HI. Has some hearing loss, not new. Has hearing aides, don't help with tinnitus. Used to be on xanax for this, off 10 years, asking to resume to help deal with tinnitus. No vertigo. No headaches. No head trauma. No falls. No changes that he can think of. Hard to sleep and fxn.      Last saw spec at [facility] and [facility] 10-20 years ago. Dr. Wharton had on xanax as above. No sI/HI. Mild anxiety.      UPDATE TODAY:   Doing better  Coping well  Not needed BZD any

## 2025-04-03 ENCOUNTER — OFFICE VISIT (OUTPATIENT)
Dept: FAMILY MEDICINE CLINIC | Age: 75
End: 2025-04-03

## 2025-04-03 VITALS
OXYGEN SATURATION: 98 % | HEART RATE: 72 BPM | SYSTOLIC BLOOD PRESSURE: 130 MMHG | RESPIRATION RATE: 16 BRPM | TEMPERATURE: 97.8 F | WEIGHT: 246.2 LBS | BODY MASS INDEX: 33.35 KG/M2 | HEIGHT: 72 IN | DIASTOLIC BLOOD PRESSURE: 70 MMHG

## 2025-04-03 DIAGNOSIS — F32.5 MAJOR DEPRESSIVE DISORDER WITH SINGLE EPISODE, IN FULL REMISSION: ICD-10-CM

## 2025-04-03 DIAGNOSIS — I10 ESSENTIAL HYPERTENSION: ICD-10-CM

## 2025-04-03 DIAGNOSIS — F43.21 GRIEF: ICD-10-CM

## 2025-04-03 DIAGNOSIS — E78.00 PURE HYPERCHOLESTEROLEMIA: ICD-10-CM

## 2025-04-03 DIAGNOSIS — E66.9 OBESITY (BMI 30-39.9): Chronic | ICD-10-CM

## 2025-04-03 DIAGNOSIS — H93.13 TINNITUS OF BOTH EARS: ICD-10-CM

## 2025-04-03 DIAGNOSIS — Z79.4 TYPE 2 DIABETES MELLITUS WITHOUT COMPLICATION, WITH LONG-TERM CURRENT USE OF INSULIN: ICD-10-CM

## 2025-04-03 DIAGNOSIS — F41.9 ANXIETY: ICD-10-CM

## 2025-04-03 DIAGNOSIS — G47.00 INSOMNIA, UNSPECIFIED TYPE: ICD-10-CM

## 2025-04-03 DIAGNOSIS — R91.1 PULMONARY NODULE, RIGHT: ICD-10-CM

## 2025-04-03 DIAGNOSIS — Z86.718 HISTORY OF RECURRENT DEEP VEIN THROMBOSIS (DVT): ICD-10-CM

## 2025-04-03 DIAGNOSIS — Z86.711 HISTORY OF PULMONARY EMBOLISM: Chronic | ICD-10-CM

## 2025-04-03 DIAGNOSIS — Z00.00 MEDICARE ANNUAL WELLNESS VISIT, SUBSEQUENT: Primary | ICD-10-CM

## 2025-04-03 DIAGNOSIS — E11.9 TYPE 2 DIABETES MELLITUS WITHOUT COMPLICATION, WITH LONG-TERM CURRENT USE OF INSULIN: ICD-10-CM

## 2025-04-03 DIAGNOSIS — K76.89 LIVER CYST: ICD-10-CM

## 2025-04-03 LAB — HBA1C MFR BLD: 6.4 % (ref 4.3–5.7)

## 2025-04-03 ASSESSMENT — LIFESTYLE VARIABLES
HOW MANY STANDARD DRINKS CONTAINING ALCOHOL DO YOU HAVE ON A TYPICAL DAY: PATIENT DOES NOT DRINK
HOW OFTEN DO YOU HAVE A DRINK CONTAINING ALCOHOL: NEVER

## 2025-04-03 ASSESSMENT — PATIENT HEALTH QUESTIONNAIRE - PHQ9
6. FEELING BAD ABOUT YOURSELF - OR THAT YOU ARE A FAILURE OR HAVE LET YOURSELF OR YOUR FAMILY DOWN: NOT AT ALL
SUM OF ALL RESPONSES TO PHQ QUESTIONS 1-9: 3
9. THOUGHTS THAT YOU WOULD BE BETTER OFF DEAD, OR OF HURTING YOURSELF: NOT AT ALL
2. FEELING DOWN, DEPRESSED OR HOPELESS: SEVERAL DAYS
5. POOR APPETITE OR OVEREATING: NOT AT ALL
1. LITTLE INTEREST OR PLEASURE IN DOING THINGS: NOT AT ALL
3. TROUBLE FALLING OR STAYING ASLEEP: SEVERAL DAYS
8. MOVING OR SPEAKING SO SLOWLY THAT OTHER PEOPLE COULD HAVE NOTICED. OR THE OPPOSITE, BEING SO FIGETY OR RESTLESS THAT YOU HAVE BEEN MOVING AROUND A LOT MORE THAN USUAL: NOT AT ALL
SUM OF ALL RESPONSES TO PHQ QUESTIONS 1-9: 3
4. FEELING TIRED OR HAVING LITTLE ENERGY: SEVERAL DAYS
10. IF YOU CHECKED OFF ANY PROBLEMS, HOW DIFFICULT HAVE THESE PROBLEMS MADE IT FOR YOU TO DO YOUR WORK, TAKE CARE OF THINGS AT HOME, OR GET ALONG WITH OTHER PEOPLE: SOMEWHAT DIFFICULT
SUM OF ALL RESPONSES TO PHQ QUESTIONS 1-9: 3
SUM OF ALL RESPONSES TO PHQ QUESTIONS 1-9: 3
7. TROUBLE CONCENTRATING ON THINGS, SUCH AS READING THE NEWSPAPER OR WATCHING TELEVISION: NOT AT ALL

## 2025-04-03 NOTE — PATIENT INSTRUCTIONS

## 2025-06-27 ENCOUNTER — TELEPHONE (OUTPATIENT)
Dept: PHARMACY | Facility: CLINIC | Age: 75
End: 2025-06-27

## 2025-06-27 NOTE — TELEPHONE ENCOUNTER
[facility] CLINICAL PHARMACY: ADHERENCE REVIEW  Identified care gap per [employer]: fills at [facility]: ACE/ARB adherence  ASSESSMENT    ACE/ARB ADHERENCE    Insurance Records claims through 25 (Prior Year PDC = not reported; YTD PDC = 67%; Potential Fail Date: 07.15.25):   LISINOP/HCTZ TAB 10-12.5 last filled on 02.10.25 for 90 day supply. Next refill due: 25    Prescribed si tablet/capsule daily    Per Insurer Portal: last filled on 25 for 90 day supply.     Per [facility]: last picked up on 25 for 90 day supply.  They refilled 25 and 25 both were returned  BP Readings from Last 3 Encounters:   25 130/70   25 132/80   25 126/78     CrCl cannot be calculated (Patient's most recent lab result is older than the maximum 180 days allowed.).  Lab Results   Component Value Date    CREATININE 1.1 10/07/2024     Lab Results   Component Value Date    K 4.0 10/07/2024     The following are interventions that have been identified:   Patient OVERDUE refilling LISINOP/HCTZ TAB 10-12.5 and active on home medication list.     Attempting to reach patient to review.  Left message asking for return call. Letter sent to patient.        Last Visit: 25  Next Visit: 10.06.25    Chichi Hayes CPhT  [facility] Clinical    [facility]  Toll Free: [phone] Option 1    For Pharmacy Admin Tracking Only    Program: [facility] [employer]  CPA in place:  No  Gap Closed?: No   Time Spent (min): 15

## 2025-06-30 DIAGNOSIS — I10 ESSENTIAL HYPERTENSION: Primary | ICD-10-CM

## 2025-06-30 RX ORDER — LISINOPRIL AND HYDROCHLOROTHIAZIDE 10; 12.5 MG/1; MG/1
0.5 TABLET ORAL DAILY
Qty: 45 TABLET | Refills: 3 | Status: SHIPPED | OUTPATIENT
Start: 2025-06-30

## 2025-06-30 NOTE — TELEPHONE ENCOUNTER
Chaparro Jhaveri DO, patient reports he's been taking HALF tablet daily \"for a long while\"; refill history supports this - can we update rx instructions to match? Rx(s) pended for your signature/modification as appropriate    Last Visit: 4/3/25  Next Visit: 10/6/25    BP Readings from Last 3 Encounters:   04/03/25 130/70   01/14/25 132/80   01/02/25 126/78      Thank you,  Riana Caruso, PharmD, [facility]  Population Health Pharmacist  [facility] Clinical Pharmacy  Department, toll free: [phone], option 1     =======================================================   From 6/27/25 telephone encounter.

## 2025-07-08 ENCOUNTER — TELEPHONE (OUTPATIENT)
Dept: FAMILY MEDICINE CLINIC | Age: 75
End: 2025-07-08

## 2025-07-08 DIAGNOSIS — I82.432 ACUTE DEEP VEIN THROMBOSIS (DVT) OF POPLITEAL VEIN OF LEFT LOWER EXTREMITY (HCC): ICD-10-CM

## 2025-07-08 DIAGNOSIS — Z86.718 HISTORY OF RECURRENT DEEP VEIN THROMBOSIS (DVT): ICD-10-CM

## 2025-07-08 NOTE — TELEPHONE ENCOUNTER
Patient called and said his Blood thinner medication he gets from [facility] is lost somewhere in [place].   He is wanting a short term RX be sent in to [facility] on [address].     Please advise

## 2025-09-03 ENCOUNTER — TELEPHONE (OUTPATIENT)
Dept: FAMILY MEDICINE CLINIC | Age: 75
End: 2025-09-03

## 2025-09-03 DIAGNOSIS — Z79.4 TYPE 2 DIABETES MELLITUS WITHOUT COMPLICATION, WITH LONG-TERM CURRENT USE OF INSULIN (HCC): Primary | ICD-10-CM

## 2025-09-03 DIAGNOSIS — E11.9 TYPE 2 DIABETES MELLITUS WITHOUT COMPLICATION, WITH LONG-TERM CURRENT USE OF INSULIN (HCC): Primary | ICD-10-CM

## 2025-09-05 ENCOUNTER — LAB (OUTPATIENT)
Dept: LAB | Age: 75
End: 2025-09-05

## 2025-09-05 ENCOUNTER — RESULTS FOLLOW-UP (OUTPATIENT)
Dept: FAMILY MEDICINE CLINIC | Age: 75
End: 2025-09-05

## 2025-09-05 DIAGNOSIS — E11.9 TYPE 2 DIABETES MELLITUS WITHOUT COMPLICATION, WITH LONG-TERM CURRENT USE OF INSULIN (HCC): ICD-10-CM

## 2025-09-05 DIAGNOSIS — Z79.4 TYPE 2 DIABETES MELLITUS WITHOUT COMPLICATION, WITH LONG-TERM CURRENT USE OF INSULIN (HCC): ICD-10-CM

## 2025-09-05 LAB
ALBUMIN SERPL BCG-MCNC: 3.7 G/DL (ref 3.4–4.9)
ALP SERPL-CCNC: 87 U/L (ref 40–129)
ALT SERPL W/O P-5'-P-CCNC: 26 U/L (ref 10–50)
ANION GAP SERPL CALC-SCNC: 11 MEQ/L (ref 8–16)
AST SERPL-CCNC: 26 U/L (ref 10–50)
BASOPHILS ABSOLUTE: 0.1 THOU/MM3 (ref 0–0.1)
BASOPHILS NFR BLD AUTO: 1 %
BILIRUB SERPL-MCNC: 0.9 MG/DL (ref 0.3–1.2)
BUN SERPL-MCNC: 19 MG/DL (ref 8–23)
CALCIUM SERPL-MCNC: 9.4 MG/DL (ref 8.5–10.5)
CHLORIDE SERPL-SCNC: 102 MEQ/L (ref 98–111)
CHOLEST SERPL-MCNC: 185 MG/DL (ref 100–199)
CO2 SERPL-SCNC: 28 MEQ/L (ref 22–29)
CREAT SERPL-MCNC: 1.1 MG/DL (ref 0.7–1.2)
CREAT UR-MCNC: 236 MG/DL
DEPRECATED RDW RBC AUTO: 49.1 FL (ref 35–45)
EOSINOPHIL NFR BLD AUTO: 3.6 %
EOSINOPHILS ABSOLUTE: 0.4 THOU/MM3 (ref 0–0.4)
ERYTHROCYTE [DISTWIDTH] IN BLOOD BY AUTOMATED COUNT: 13.5 % (ref 11.5–14.5)
GFR SERPL CREATININE-BSD FRML MDRD: 69 ML/MIN/1.73M2
GLUCOSE SERPL-MCNC: 132 MG/DL (ref 74–109)
HCT VFR BLD AUTO: 46.4 % (ref 42–52)
HDLC SERPL-MCNC: 54 MG/DL
HGB BLD-MCNC: 14.7 GM/DL (ref 14–18)
IMM GRANULOCYTES # BLD AUTO: 0.05 THOU/MM3 (ref 0–0.07)
IMM GRANULOCYTES NFR BLD AUTO: 0.5 %
LDLC SERPL CALC-MCNC: 108 MG/DL
LYMPHOCYTES ABSOLUTE: 3.8 THOU/MM3 (ref 1–4.8)
LYMPHOCYTES NFR BLD AUTO: 36.4 %
MCH RBC QN AUTO: 31 PG (ref 26–33)
MCHC RBC AUTO-ENTMCNC: 31.7 GM/DL (ref 32.2–35.5)
MCV RBC AUTO: 97.9 FL (ref 80–94)
MICROALBUMIN UR-MCNC: < 2 MG/DL
MICROALBUMIN/CREAT RATIO PNL UR: 8 MG/G (ref 0–30)
MONOCYTES ABSOLUTE: 1.4 THOU/MM3 (ref 0.4–1.3)
MONOCYTES NFR BLD AUTO: 12.9 %
NEUTROPHILS ABSOLUTE: 4.8 THOU/MM3 (ref 1.8–7.7)
NEUTROPHILS NFR BLD AUTO: 45.6 %
NRBC BLD AUTO-RTO: 0 /100 WBC
PLATELET # BLD AUTO: 236 THOU/MM3 (ref 130–400)
PMV BLD AUTO: 11.3 FL (ref 9.4–12.4)
POTASSIUM SERPL-SCNC: 4 MEQ/L (ref 3.5–5.2)
PROT SERPL-MCNC: 7.5 G/DL (ref 6.4–8.3)
RBC # BLD AUTO: 4.74 MILL/MM3 (ref 4.7–6.1)
SODIUM SERPL-SCNC: 141 MEQ/L (ref 135–145)
TRIGL SERPL-MCNC: 114 MG/DL (ref 0–199)
TSH SERPL DL<=0.05 MIU/L-ACNC: 1.16 UIU/ML (ref 0.27–4.2)
WBC # BLD AUTO: 10.5 THOU/MM3 (ref 4.8–10.8)